# Patient Record
Sex: MALE | Race: BLACK OR AFRICAN AMERICAN | Employment: OTHER | RURAL
[De-identification: names, ages, dates, MRNs, and addresses within clinical notes are randomized per-mention and may not be internally consistent; named-entity substitution may affect disease eponyms.]

---

## 2017-01-04 ENCOUNTER — OFFICE VISIT (OUTPATIENT)
Dept: FAMILY MEDICINE CLINIC | Age: 82
End: 2017-01-04

## 2017-01-04 VITALS
SYSTOLIC BLOOD PRESSURE: 127 MMHG | DIASTOLIC BLOOD PRESSURE: 72 MMHG | HEIGHT: 68 IN | RESPIRATION RATE: 16 BRPM | HEART RATE: 74 BPM | WEIGHT: 167.6 LBS | OXYGEN SATURATION: 100 % | TEMPERATURE: 97.8 F | BODY MASS INDEX: 25.4 KG/M2

## 2017-01-04 DIAGNOSIS — Z23 NEED FOR TDAP VACCINATION: ICD-10-CM

## 2017-01-04 DIAGNOSIS — D75.1 ERYTHROCYTOSIS: Chronic | ICD-10-CM

## 2017-01-04 DIAGNOSIS — E11.40 TYPE 2 DIABETES MELLITUS WITH DIABETIC NEUROPATHY, WITHOUT LONG-TERM CURRENT USE OF INSULIN (HCC): Primary | Chronic | ICD-10-CM

## 2017-01-04 DIAGNOSIS — I10 ESSENTIAL HYPERTENSION: Chronic | ICD-10-CM

## 2017-01-04 DIAGNOSIS — E78.00 PURE HYPERCHOLESTEROLEMIA: Chronic | ICD-10-CM

## 2017-01-04 NOTE — PATIENT INSTRUCTIONS
Diabetes Foot Health: Care Instructions  Your Care Instructions    When you have diabetes, your feet need extra care and attention. Diabetes can damage the nerve endings and blood vessels in your feet, making you less likely to notice when your feet are injured. Diabetes also limits your body's ability to fight infection and get blood to areas that need it. If you get a minor foot injury, it could become an ulcer or a serious infection. With good foot care, you can prevent most of these problems. Caring for your feet can be quick and easy. Most of the care can be done when you are bathing or getting ready for bed. Follow-up care is a key part of your treatment and safety. Be sure to make and go to all appointments, and call your doctor if you are having problems. Its also a good idea to know your test results and keep a list of the medicines you take. How can you care for yourself at home? · Keep your blood sugar close to normal by watching what and how much you eat, monitoring blood sugar, taking medicines if prescribed, and getting regular exercise. · Do not smoke. Smoking affects blood flow and can make foot problems worse. If you need help quitting, talk to your doctor about stop-smoking programs and medicines. These can increase your chances of quitting for good. · Eat a diet that is low in fats. High fat intake can cause fat to build up in your blood vessels and decrease blood flow. · Inspect your feet daily for blisters, cuts, cracks, or sores. If you cannot see well, use a mirror or have someone help you. · Take care of your feet:  [facility] your feet every day. Use warm (not hot) water. Check the water temperature with your wrists or other part of your body, not your feet. ¨ Dry your feet well. Pat them dry. Do not rub the skin on your feet too hard. Dry well between your toes. If the skin on your feet stays moist, bacteria or a fungus can grow, which can lead to infection.   ¨ Keep your skin soft. Use moisturizing skin cream to keep the skin on your feet soft and prevent calluses and cracks. But do not put the cream between your toes, and stop using any cream that causes a rash. ¨ Clean underneath your toenails carefully. Do not use a sharp object to clean underneath your toenails. Use the blunt end of a nail file or other rounded tool. ¨ Trim and file your toenails straight across to prevent ingrown toenails. Use a nail clipper, not scissors. Use an emery board to smooth the edges. · Change socks daily. Socks without seams are best, because seams often rub the feet. You can find socks for people with diabetes from specialty catalogs. · Look inside your shoes every day for things like gravel or torn linings, which could cause blisters or sores. · Buy shoes that fit well:  ¨ Look for shoes that have plenty of space around the toes. This helps prevent bunions and blisters. ¨ Try on shoes while wearing the kind of socks you will usually wear with the shoes. ¨ Avoid plastic shoes. They may rub your feet and cause blisters. Good shoes should be made of materials that are flexible and breathable, such as leather or cloth. ¨ Break in new shoes slowly by wearing them for no more than an hour a day for several days. Take extra time to check your feet for red areas, blisters, or other problems after you wear new shoes. · Do not go barefoot. Do not wear sandals, and do not wear shoes with very thin soles. Thin soles are easy to puncture. They also do not protect your feet from hot pavement or cold weather. · Have your doctor check your feet during each visit. If you have a foot problem, see your doctor. Do not try to treat an early foot problem at home. Home remedies or treatments that you can buy without a prescription (such as corn removers) can be harmful. · Always get early treatment for foot problems. A minor irritation can lead to a major problem if not properly cared for early.   When should you call for help? Call your doctor now or seek immediate medical care if:  · You have a foot sore, an ulcer or break in the skin that is not healing after 4 days, bleeding corns or calluses, or an ingrown toenail. · You have blue or black areas, which can mean bruising or blood flow problems. · You have peeling skin or tiny blisters between your toes or cracking or oozing of the skin. · You have a fever for more than 24 hours and a foot sore. · You have new numbness or tingling in your feet that does not go away after you move your feet or change positions. · You have unexplained or unusual swelling of the foot or ankle. Watch closely for changes in your health, and be sure to contact your doctor if:  · You cannot do proper foot care. Where can you learn more? Go to http://marylu-jennifer.info/. Enter A739 in the search box to learn more about \"Diabetes Foot Health: Care Instructions. \"  Current as of: May 23, 2016  Content Version: 11.1  © 6344-7376 Healthwise, Incorporated. Care instructions adapted under license by [employer] (which disclaims liability or warranty for this information). If you have questions about a medical condition or this instruction, always ask your healthcare professional. [address] any warranty or liability for your use of this information.

## 2017-01-04 NOTE — MR AVS SNAPSHOT
Visit Information Date & Time Provider Department Dept. Phone Encounter #  
 1/4/2017  8:00 AM Aruna Rivas MD [address] [place], [place] [address] [id number] Follow-up Instructions Return in about 4 months (around 5/4/2017) for fasting. Blanco Mooreanne marie Upcoming Health Maintenance Date Due DTaP/Tdap/Td series (1 - Tdap) 9/7/2005 FOOT EXAM Q1 8/5/2016 HEMOGLOBIN A1C Q6M 11/4/2016 MEDICARE YEARLY EXAM 2/23/2017 MICROALBUMIN Q1 5/4/2017 LIPID PANEL Q1 5/4/2017 EYE EXAM RETINAL OR DILATED Q1 12/1/2017 GLAUCOMA SCREENING Q2Y 12/1/2018 Allergies as of 1/4/2017  Review Complete On: 1/4/2017 By: Aruna Rivas MD  
 No Known Allergies Current Immunizations  Reviewed on 5/4/2016 Name Date Influenza Vaccine 10/27/2015, 9/30/2014, 9/17/2013 Influenza Vaccine (Quad) PF 10/5/2016 Influenza Vaccine Split 11/14/2012, 10/14/2011, 10/11/2010 Influenza Vaccine Whole 10/11/2010, 9/9/2009 Pneumococcal Conjugate (PCV-13) 8/5/2015 Pneumococcal Vaccine (Unspecified Type) 12/1/2010, 11/16/1997 TD Vaccine 9/6/2005 Not reviewed this visit You Were Diagnosed With   
  
 Codes Comments Type 2 diabetes mellitus with diabetic neuropathy, without long-term current use of insulin (HCC)    -  Primary ICD-10-CM: E11.40 ICD-9-CM: 250.60, 357.2 Essential hypertension     ICD-10-CM: I10 
ICD-9-CM: 401.9 Erythrocytosis     ICD-10-CM: D75.1 ICD-9-CM: 289.0 Pure hypercholesterolemia     ICD-10-CM: E78.00 ICD-9-CM: 272.0 Need for Tdap vaccination     ICD-10-CM: H24 ICD-9-CM: V06.1 Vitals BP Pulse Temp Resp Height(growth percentile) Weight(growth percentile) 127/72 (BP 1 Location: Left arm, BP Patient Position: Sitting) 74 97.8 °F (36.6 °C) (Oral) 16 5' 8\" (1.727 m) 167 lb 9.6 oz (76 kg) SpO2 BMI Smoking Status 100% 25.48 kg/m2 Former Smoker Vitals History BMI and BSA Data Body Mass Index Body Surface Area  
 25.48 kg/m 2 1.91 m 2 Preferred Pharmacy Pharmacy Name Phone [facility] [address] [facility] [place], [facility] [address] [phone] Your Updated Medication List  
  
   
This list is accurate as of: 1/4/17  8:19 AM.  Always use your most recent med list.  
  
  
  
  
 aspirin 81 mg tablet Take  by mouth. Blood-Glucose Meter monitoring kit TrueTest Dx E11.9 testing once a day  
  
 diph,pertuss(acel),tetanus vac(PF) 2 Lf-(2.5-5-3-5 mcg)-5Lf/0.5 mL Syrg vaccine Commonly known as:  ADACEL(TDAP ADOLESN/ADULT)(PF)  
0.5 mL by IntraMUSCular route once for 1 dose. Administer in pharmacy and fax confirmation to [phone]. enalapril 10 mg tablet Commonly known as:  VASOTEC  
TAKE ONE TABLET BY MOUTH ONCE DAILY  
  
 finasteride 5 mg tablet Commonly known as:  PROSCAR Take 1 tablet by mouth daily. glucose blood VI test strips strip Commonly known as:  TRUETEST TEST STRIPS Dx E11.9 testing once a day  
  
 hydroCHLOROthiazide 25 mg tablet Commonly known as:  HYDRODIURIL  
TAKE ONE TABLET BY MOUTH ONCE DAILY (GENERIC FOR HYDRODIURIL) Lancets Misc Relion lancets. Check BS once daily. DX:E11.9  
  
 metFORMIN 500 mg tablet Commonly known as:  GLUCOPHAGE  
TAKE ONE-HALF TABLET BY MOUTH TWICE DAILY WITH MEALS  
  
 naproxen 375 mg tablet Commonly known as:  NAPROSYN Take 1 Tab by mouth two (2) times daily (with meals). simvastatin 20 mg tablet Commonly known as:  ZOCOR Take 1 Tab by mouth nightly. tamsulosin 0.4 mg capsule Commonly known as:  FLOMAX Take 1 capsule by mouth daily. triamcinolone acetonide 0.1 % topical cream  
Commonly known as:  KENALOG Apply  to affected area two (2) times a day. use thin layer VITAMIN D3 400 unit Tab tablet Generic drug:  cholecalciferol Take  by mouth daily. Prescriptions Printed Refills diph,pertuss,acel,,tetanus vac,PF, (ADACEL,TDAP ADOLESN/ADULT,,PF,) 2 Lf-(2.5-5-3-5 mcg)-5Lf/0.5 mL syrg vaccine 0 Si.5 mL by IntraMUSCular route once for 1 dose. Administer in pharmacy and fax confirmation to [phone]. Class: Print Route: IntraMUSCular Follow-up Instructions Return in about 4 months (around 2017) for fasting. James Patricia Patient Instructions Diabetes Foot Health: Care Instructions Your Care Instructions When you have diabetes, your feet need extra care and attention. Diabetes can damage the nerve endings and blood vessels in your feet, making you less likely to notice when your feet are injured. Diabetes also limits your body's ability to fight infection and get blood to areas that need it. If you get a minor foot injury, it could become an ulcer or a serious infection. With good foot care, you can prevent most of these problems. Caring for your feet can be quick and easy. Most of the care can be done when you are bathing or getting ready for bed. Follow-up care is a key part of your treatment and safety. Be sure to make and go to all appointments, and call your doctor if you are having problems. Its also a good idea to know your test results and keep a list of the medicines you take. How can you care for yourself at home? · Keep your blood sugar close to normal by watching what and how much you eat, monitoring blood sugar, taking medicines if prescribed, and getting regular exercise. · Do not smoke. Smoking affects blood flow and can make foot problems worse. If you need help quitting, talk to your doctor about stop-smoking programs and medicines. These can increase your chances of quitting for good. · Eat a diet that is low in fats. High fat intake can cause fat to build up in your blood vessels and decrease blood flow. · Inspect your feet daily for blisters, cuts, cracks, or sores. If you cannot see well, use a mirror or have someone help you. · Take care of your feet: 
[facility] [place] [facility] your feet every day. Use warm (not hot) water. Check the water temperature with your wrists or other part of your body, not your feet. ¨ Dry your feet well. Pat them dry. Do not rub the skin on your feet too hard. Dry well between your toes. If the skin on your feet stays moist, bacteria or a fungus can grow, which can lead to infection. ¨ Keep your skin soft. Use moisturizing skin cream to keep the skin on your feet soft and prevent calluses and cracks. But do not put the cream between your toes, and stop using any cream that causes a rash. ¨ Clean underneath your toenails carefully. Do not use a sharp object to clean underneath your toenails. Use the blunt end of a nail file or other rounded tool. ¨ Trim and file your toenails straight across to prevent ingrown toenails. Use a nail clipper, not scissors. Use an emery board to smooth the edges. · Change socks daily. Socks without seams are best, because seams often rub the feet. You can find socks for people with diabetes from specialty catalogs. · Look inside your shoes every day for things like gravel or torn linings, which could cause blisters or sores. · Buy shoes that fit well: 
¨ Look for shoes that have plenty of space around the toes. This helps prevent bunions and blisters. ¨ Try on shoes while wearing the kind of socks you will usually wear with the shoes. ¨ Avoid plastic shoes. They may rub your feet and cause blisters. Good shoes should be made of materials that are flexible and breathable, such as leather or cloth. ¨ Break in new shoes slowly by wearing them for no more than an hour a day for several days. Take extra time to check your feet for red areas, blisters, or other problems after you wear new shoes. · Do not go barefoot. Do not wear sandals, and do not wear shoes with very thin soles. Thin soles are easy to puncture. They also do not protect your feet from hot pavement or cold weather. · Have your doctor check your feet during each visit. If you have a foot problem, see your doctor. Do not try to treat an early foot problem at home. Home remedies or treatments that you can buy without a prescription (such as corn removers) can be harmful. · Always get early treatment for foot problems. A minor irritation can lead to a major problem if not properly cared for early. When should you call for help? Call your doctor now or seek immediate medical care if: 
· You have a foot sore, an ulcer or break in the skin that is not healing after 4 days, bleeding corns or calluses, or an ingrown toenail. · You have blue or black areas, which can mean bruising or blood flow problems. · You have peeling skin or tiny blisters between your toes or cracking or oozing of the skin. · You have a fever for more than 24 hours and a foot sore. · You have new numbness or tingling in your feet that does not go away after you move your feet or change positions. · You have unexplained or unusual swelling of the foot or ankle. Watch closely for changes in your health, and be sure to contact your doctor if: 
· You cannot do proper foot care. Where can you learn more? Go to http://marylu-jennifer.info/. Enter A739 in the search box to learn more about \"Diabetes Foot Health: Care Instructions. \" Current as of: May 23, 2016 Content Version: 11.1 © 8099-9115 [employer]. Care instructions adapted under license by [employer] (which disclaims liability or warranty for this information). If you have questions about a medical condition or this instruction, always ask your healthcare professional. [address] any warranty or liability for your use of this information. Introducing [facility]! Maia Wesley introduces [employer] patient portal. Now you can access parts of your medical record, email your doctor's office, and request medication refills online. 1. In your internet browser, go to [email or web address] 2. Click on the First Time User? Click Here link in the Sign In box. You will see the New Member Sign Up page. 3. Enter your SNTMNT Access Code exactly as it appears below. You will not need to use this code after youve completed the sign-up process. If you do not sign up before the expiration date, you must request a new code. · SNTMNT Access Code: [email or web address] Expires: 1/29/2017 10:26 AM 
 
4. Enter the last four digits of your Social Security Number (xxxx) and Date of Birth (mm/dd/yyyy) as indicated and click Submit. You will be taken to the next sign-up page. 5. Create a Aquatic Informaticst ID. This will be your SNTMNT login ID and cannot be changed, so think of one that is secure and easy to remember. 6. Create a SNTMNT password. You can change your password at any time. 7. Enter your Password Reset Question and Answer. This can be used at a later time if you forget your password. 8. Enter your e-mail address. You will receive e-mail notification when new information is available in [address] 9. Click Sign Up. You can now view and download portions of your medical record. 10. Click the Download Summary menu link to download a portable copy of your medical information. If you have questions, please visit the Frequently Asked Questions section of the SNTMNT website. Remember, SNTMNT is NOT to be used for urgent needs. For medical emergencies, dial 911. Now available from your iPhone and Android! Please provide this summary of care documentation to your next provider. Your primary care clinician is listed as [address]. If you have any questions after today's visit, please call [phone].

## 2017-01-04 NOTE — PROGRESS NOTES
Chief Complaint   Patient presents with    Hypertension    Labs     Fasting     Body mass index is 25.48 kg/(m^2). Reviewed record in preparation for visit and have necessary documentation  Pt did not bring medication to office visit for review  Information was given to pt on Advanced Directives, Living Will  Information was given on Shingles Vaccine  Opportunity was given for questions  Goals that were addressed and/or need to be completed after this appointment include:     Health Maintenance Due   Topic Date Due    DTaP/Tdap/Td series (1 - Tdap) 09/07/2005    FOOT EXAM Q1  08/05/2016    HEMOGLOBIN A1C Q6M  11/04/2016       - check for functional glucose monitor and record keeping system; Yes, checks sugars twice daily  Pt was given BS record log to document home readings and return to office for review  Diabetic Bundle:  LDL-7.3  A1C-6.0  BP-127/72  Smoking? No  Anticoagulation medication? Yes  Eye exam dilated? Good  Foot exam?Due

## 2017-01-04 NOTE — PROGRESS NOTES
Progress Note    Patient: Erik Scott MRN: [id number]  SSN: [id number]    YOB: 1927  Age: 80 y.o. Sex: male        Chief Complaint   Patient presents with    Hypertension    Labs     Fasting         Subjective:     Encounter Diagnoses   Name Primary?  Type 2 diabetes mellitus with diabetic neuropathy, without long-term current use of insulin ([facility] [address]): This patient is managed under a comprehensive plan of care for Diabetes. Overall the patient feels well with good energy level. Last Point of Care HGB A1C  Hemoglobin A1c (POC)   Date Value Ref Range Status   08/05/2015 6.1 % Final       Pertinent Labs:   Lab Results   Component Value Date/Time    Hemoglobin A1c 6.0 05/04/2016 08:22 AM    Hemoglobin A1c 6.0 12/30/2015 08:21 AM    Hemoglobin A1c 6.3 05/01/2015 01:53 PM      Body mass index is 25.48 kg/(m^2). Lab Results   Component Value Date/Time    LDL, calculated 73 05/04/2016 08:22 AM         Lab Results   Component Value Date/Time    Sodium 136 05/04/2016 08:22 AM    Potassium 4.5 05/04/2016 08:22 AM    Chloride 95 05/04/2016 08:22 AM    CO2 26 05/04/2016 08:22 AM    Anion gap 10 07/15/2010 08:52 AM    Glucose 87 05/04/2016 08:22 AM    BUN 11 05/04/2016 08:22 AM    Creatinine 0.87 05/04/2016 08:22 AM    BUN/Creatinine ratio 13 05/04/2016 08:22 AM    GFR est AA 89 05/04/2016 08:22 AM    GFR est non-AA 77 05/04/2016 08:22 AM    Calcium 9.4 05/04/2016 08:22 AM    ALT 11 05/04/2016 08:22 AM    AST 16 05/04/2016 08:22 AM    Alk.  phosphatase 53 05/04/2016 08:22 AM    Protein, total 7.7 05/04/2016 08:22 AM    Albumin 4.3 05/04/2016 08:22 AM    Globulin 3.9 07/15/2010 08:52 AM    A-G Ratio 1.3 05/04/2016 08:22 AM     Lab Results   Component Value Date/Time    Microalbumin/Creat ratio (mg/g creat) 5 07/15/2010 08:52 AM    Microalb/Creat ratio (ug/mg creat.) <5.1 05/04/2016 08:22 AM    Microalbumin,urine random 0.77 07/15/2010 08:52 AM      Frequency of home glucose testing:daily   Blood Sugar range at home:    Last eye exam: In past 12 months. Last foot exam: Today   Polyuria, polyphagia or polydipsia: No   Retinopathy: No   Neuropathy SX: mild   Low blood sugar symptoms: No   Dietary compliance: Good   Medication compliance:Good   On ASA: Yes   Depression: No   CKD:no     Wt Readings from Last 3 Encounters:   01/04/17 167 lb 9.6 oz (76 kg)   08/09/16 161 lb 6.4 oz (73.2 kg)   06/03/16 159 lb 6.4 oz (72.3 kg)        History   Smoking Status    Former Smoker    Packs/day: 1.00    Years: 15.00   Smokeless Tobacco    Never Used     All the patient's questions regarding medications, diet and exercise were answered. Goal of A1C of less than 7% is our goal.   Our overall goal is to reduce or eliminate the long term consequences of poorly controlled diabetes. Yes    Essential hypertension:  BP Readings from Last 3 Encounters:   01/04/17 127/72   08/09/16 103/61   06/03/16 121/72     The patient reports:  taking medications as instructed, no medication side effects noted, no TIA's, no chest pain on exertion, no dyspnea on exertion, no swelling of ankles. Lab Results   Component Value Date/Time    Sodium 136 05/04/2016 08:22 AM    Potassium 4.5 05/04/2016 08:22 AM    Chloride 95 05/04/2016 08:22 AM    CO2 26 05/04/2016 08:22 AM    Anion gap 10 07/15/2010 08:52 AM    Glucose 87 05/04/2016 08:22 AM    BUN 11 05/04/2016 08:22 AM    Creatinine 0.87 05/04/2016 08:22 AM    BUN/Creatinine ratio 13 05/04/2016 08:22 AM    GFR est AA 89 05/04/2016 08:22 AM    GFR est non-AA 77 05/04/2016 08:22 AM    Calcium 9.4 05/04/2016 08:22 AM    Bilirubin, total 0.6 05/04/2016 08:22 AM    ALT 11 05/04/2016 08:22 AM    AST 16 05/04/2016 08:22 AM    Alk.  phosphatase 53 05/04/2016 08:22 AM    Protein, total 7.7 05/04/2016 08:22 AM    Albumin 4.3 05/04/2016 08:22 AM    Globulin 3.9 07/15/2010 08:52 AM    A-G Ratio 1.3 05/04/2016 08:22 AM     Our goal is to normalize the blood pressure to decrease the risks of strokes and heart attacks. The patient is in agreement with the plan.  Erythrocytosis:  Lab Results   Component Value Date/Time    HGB 14.7 05/04/2016 08:22 AM            Pure hypercholesterolemia:  Cardiovascular risks for him are: LDL goal is under 80  diabetic  hypertension  hyperlipidemia. Currently he takes Zocor (simvastatin) , 20 mg. Lab Results   Component Value Date/Time    Cholesterol, total 130 05/04/2016 08:22 AM    HDL Cholesterol 47 05/04/2016 08:22 AM    LDL, calculated 73 05/04/2016 08:22 AM    Triglyceride 49 05/04/2016 08:22 AM    CHOL/HDL Ratio 3.1 07/15/2010 08:52 AM     Lab Results   Component Value Date/Time    ALT 11 05/04/2016 08:22 AM    AST 16 05/04/2016 08:22 AM    Alk. phosphatase 53 05/04/2016 08:22 AM    Bilirubin, total 0.6 05/04/2016 08:22 AM      Myalgias: No   Fatigue: No   Other side effects: no  Wt Readings from Last 3 Encounters:   01/04/17 167 lb 9.6 oz (76 kg)   08/09/16 161 lb 6.4 oz (73.2 kg)   06/03/16 159 lb 6.4 oz (72.3 kg)     The patient is aware of our goal to reduce or eliminate the long term problems (such as strokes and heart attacks) related to poorly controlled hyperlipidemia.  Need for Tdap vaccination: rx given              Current and past medical information:    Current Medications after this visit[de-identified]     Current Outpatient Prescriptions   Medication Sig    diph,pertuss,acel,,tetanus vac,PF, (ADACEL,TDAP ADOLESN/ADULT,,PF,) 2 Lf-(2.5-5-3-5 mcg)-5Lf/0.5 mL syrg vaccine 0.5 mL by IntraMUSCular route once for 1 dose. Administer in pharmacy and fax confirmation to [phone].  metFORMIN (GLUCOPHAGE) 500 mg tablet TAKE ONE-HALF TABLET BY MOUTH TWICE DAILY WITH MEALS    hydroCHLOROthiazide (HYDRODIURIL) 25 mg tablet TAKE ONE TABLET BY MOUTH ONCE DAILY (GENERIC FOR HYDRODIURIL)    enalapril (VASOTEC) 10 mg tablet TAKE ONE TABLET BY MOUTH ONCE DAILY    naproxen (NAPROSYN) 375 mg tablet Take 1 Tab by mouth two (2) times daily (with meals).     Lancets misc Relion lancets. Check BS once daily. DX:E11.9    glucose blood VI test strips (TRUETEST TEST STRIPS) strip Dx E11.9 testing once a day    Blood-Glucose Meter monitoring kit TrueTest Dx E11.9 testing once a day    simvastatin (ZOCOR) 20 mg tablet Take 1 Tab by mouth nightly.  tamsulosin (FLOMAX) 0.4 mg capsule Take 1 capsule by mouth daily.  finasteride (PROSCAR) 5 mg tablet Take 1 tablet by mouth daily.  cholecalciferol (VITAMIN D-3) 400 unit Tab tablet Take  by mouth daily.  aspirin 81 mg Tab Take  by mouth.  triamcinolone acetonide (KENALOG) 0.1 % topical cream Apply  to affected area two (2) times a day. use thin layer     No current facility-administered medications for this visit. Patient Active Problem List    Diagnosis Date Noted    Type 2 diabetes mellitus with diabetic neuropathy ([facility] [250.60, 357.2] 08/05/2015     Priority: 1 - One    Diabetes ([facility] 03/10/2014     Priority: 1 - One    Colon polyp 11/16/2010     Priority: 1 - One    Hyperlipidemia 11/16/2010     Priority: 1 - One    Hypertension      Priority: 1 - One    Erythrocytosis      Priority: 1 - One    Erectile dysfunction      Priority: 1 - One    Prostate cancer ([facility] 01/27/2011    Hyperkeratosis 11/16/2010       Past Medical History   Diagnosis Date    Allergic rhinitis     Benign prostatic hypertrophy     Erectile dysfunction     Erythrocytosis     Hypertension     Polyp, colon        No Known Allergies    History reviewed. No pertinent past surgical history.     Social History     Social History    Marital status:      Spouse name: N/A    Number of children: N/A    Years of education: N/A     Social History Main Topics    Smoking status: Former Smoker     Packs/day: 1.00     Years: 15.00    Smokeless tobacco: Never Used    Alcohol use No    Drug use: No    Sexual activity: Not Asked     Other Topics Concern    None     Social History Narrative       Review of Systems Constitutional: Negative. Negative for chills, fever, malaise/fatigue and weight loss. HENT: Negative. Negative for hearing loss. Eyes: Negative. Negative for blurred vision and double vision. Last eye exam was perfect   Respiratory: Negative. Negative for cough, hemoptysis, sputum production and shortness of breath. Cardiovascular: Negative. Negative for chest pain, palpitations and orthopnea. Gastrointestinal: Negative. Negative for abdominal pain, blood in stool, heartburn, nausea and vomiting. Genitourinary: Negative. Negative for dysuria, frequency and urgency. Musculoskeletal: Negative. Negative for back pain, myalgias and neck pain. Skin: Negative. Negative for rash. Neurological: Negative. Negative for dizziness, tingling, tremors, weakness and headaches. Endo/Heme/Allergies: Negative. Psychiatric/Behavioral: Negative. Negative for depression. Objective:     Vitals:    01/04/17 0806   BP: 127/72   Pulse: 74   Resp: 16   Temp: 97.8 °F (36.6 °C)   TempSrc: Oral   SpO2: 100%   Weight: 167 lb 9.6 oz (76 kg)   Height: 5' 8\" (1.727 m)      Body mass index is 25.48 kg/(m^2). Physical Exam   Constitutional: He is oriented to person, place, and time and well-developed, well-nourished, and in no distress. HENT:   Head: Normocephalic and atraumatic. Mouth/Throat: Oropharynx is clear and moist.   Eyes: Right eye exhibits no discharge. Left eye exhibits no discharge. No scleral icterus. Neck: No tracheal deviation present. No thyromegaly present. No bruit. Cardiovascular: Normal rate, regular rhythm and normal heart sounds. Pulmonary/Chest: Effort normal and breath sounds normal.   Abdominal: Soft. Musculoskeletal:   Diabetic foot exam:  Sensation: Missed approximately one fourth of the monofilament testing  Calluses or corns: no  Pulses: intact  Fungal nails: no     Neurological: He is alert and oriented to person, place, and time.    Skin: No rash noted. No erythema. Psychiatric: Mood and affect normal.   Nursing note and vitals reviewed. Health Maintenance Due   Topic Date Due    DTaP/Tdap/Td series (1 - Tdap)-prescription given  09/07/2005    FOOT EXAM Q1-done  08/05/2016    HEMOGLOBIN A1C Q6M- 11/04/2016       Assessment and orders:       ICD-10-CM ICD-9-CM    1. Type 2 diabetes mellitus with diabetic neuropathy, without long-term current use of insulin (HCC) good control by blood sugar report  E11.40 250.60 HM DIABETES FOOT EXAM     357.2 HEMOGLOBIN A1C WITH EAG      LIPID PANEL      METABOLIC PANEL, COMPREHENSIVE      MICROALBUMIN, UR, RAND W/ MICROALBUMIN/CREA RATIO      TSH 3RD GENERATION   2. Essential hypertension-controlled  I10 401.9 HEMOGLOBIN      TSH 3RD GENERATION   3. Erythrocytosis-normal    D75.1 289.0 HEMOGLOBIN   4. Pure hypercholesterolemia-recheck  E78.00 272.0 LIPID PANEL      METABOLIC PANEL, COMPREHENSIVE      TSH 3RD GENERATION   5. Need for Tdap vaccination Z23 V06.1 diph,pertuss,acel,,tetanus vac,PF, (ADACEL,TDAP ADOLESN/ADULT,,PF,) 2 Lf-(2.5-5-3-5 mcg)-5Lf/0.5 mL syrg vaccine         Plan of care:  Discussed diagnoses in detail with patient. Medication risks/benefits/side effects discussed with patient. All of the patient's questions were addressed. The patient understands and agrees with our plan of care. The patient knows to call back if they are unsure of or forget any changes we discussed today or if the symptoms change. The patient received an After-Visit Summary which contains VS, orders, medication list and allergy list. This can be used as a \"mini-medical record\" should they have to seek medical care while out of town. Patient Care Team:  Claudy Hill MD as PCP - General    Follow-up Disposition:  Return in about 4 months (around 5/4/2017) for fasting. .    No future appointments.     Signed By: Claudy Hill MD     January 4, 2017

## 2017-01-05 LAB
ALBUMIN SERPL-MCNC: 4.3 G/DL (ref 3.5–4.7)
ALBUMIN/CREAT UR: <4.1 MG/G CREAT (ref 0–30)
ALBUMIN/GLOB SERPL: 1.4 {RATIO} (ref 1.1–2.5)
ALP SERPL-CCNC: 53 IU/L (ref 39–117)
ALT SERPL-CCNC: 7 IU/L (ref 0–44)
AST SERPL-CCNC: 16 IU/L (ref 0–40)
BILIRUB SERPL-MCNC: 0.8 MG/DL (ref 0–1.2)
BUN SERPL-MCNC: 13 MG/DL (ref 8–27)
BUN/CREAT SERPL: 15 (ref 10–22)
CALCIUM SERPL-MCNC: 9.3 MG/DL (ref 8.6–10.2)
CHLORIDE SERPL-SCNC: 95 MMOL/L (ref 96–106)
CHOLEST SERPL-MCNC: 135 MG/DL (ref 100–199)
CO2 SERPL-SCNC: 28 MMOL/L (ref 18–29)
CREAT SERPL-MCNC: 0.87 MG/DL (ref 0.76–1.27)
CREAT UR-MCNC: 72.9 MG/DL
EST. AVERAGE GLUCOSE BLD GHB EST-MCNC: 120 MG/DL
GLOBULIN SER CALC-MCNC: 3.1 G/DL (ref 1.5–4.5)
GLUCOSE SERPL-MCNC: 90 MG/DL (ref 65–99)
HBA1C MFR BLD: 5.8 % (ref 4.8–5.6)
HDLC SERPL-MCNC: 50 MG/DL
HGB BLD-MCNC: 15.9 G/DL (ref 12.6–17.7)
LDLC SERPL CALC-MCNC: 73 MG/DL (ref 0–99)
MICROALBUMIN UR-MCNC: <3 UG/ML
POTASSIUM SERPL-SCNC: 4.1 MMOL/L (ref 3.5–5.2)
PROT SERPL-MCNC: 7.4 G/DL (ref 6–8.5)
SODIUM SERPL-SCNC: 139 MMOL/L (ref 134–144)
TRIGL SERPL-MCNC: 60 MG/DL (ref 0–149)
TSH SERPL DL<=0.005 MIU/L-ACNC: 2.85 UIU/ML (ref 0.45–4.5)
VLDLC SERPL CALC-MCNC: 12 MG/DL (ref 5–40)

## 2017-01-10 DIAGNOSIS — E78.00 PURE HYPERCHOLESTEROLEMIA: Chronic | ICD-10-CM

## 2017-01-10 RX ORDER — SIMVASTATIN 20 MG/1
20 TABLET, FILM COATED ORAL
Qty: 90 TAB | Refills: 3 | Status: SHIPPED | OUTPATIENT
Start: 2017-01-10 | End: 2017-04-18 | Stop reason: SDUPTHER

## 2017-03-20 ENCOUNTER — OFFICE VISIT (OUTPATIENT)
Dept: FAMILY MEDICINE CLINIC | Age: 82
End: 2017-03-20

## 2017-03-20 VITALS
HEART RATE: 69 BPM | BODY MASS INDEX: 26.37 KG/M2 | OXYGEN SATURATION: 100 % | TEMPERATURE: 97.4 F | DIASTOLIC BLOOD PRESSURE: 48 MMHG | WEIGHT: 174 LBS | RESPIRATION RATE: 18 BRPM | SYSTOLIC BLOOD PRESSURE: 120 MMHG | HEIGHT: 68 IN

## 2017-03-20 DIAGNOSIS — Z13.31 SCREENING FOR DEPRESSION: ICD-10-CM

## 2017-03-20 DIAGNOSIS — Z13.39 SCREENING FOR ALCOHOLISM: ICD-10-CM

## 2017-03-20 DIAGNOSIS — Z00.00 MEDICARE ANNUAL WELLNESS VISIT, SUBSEQUENT: Primary | ICD-10-CM

## 2017-03-20 NOTE — MR AVS SNAPSHOT
Visit Information Date & Time Provider Department Dept. Phone Encounter #  
 3/20/2017  1:25 PM Nik Lozoya MD [address] [place] [address] [phone] [id number] Follow-up Instructions Return as scheduled. Comfort Rodriguezing Your Appointments 5/12/2017  8:00 AM  
ROUTINE CARE with Nik Lozoya MD  
[address] [place] [facility]) Appt Note: 4 mo f/u-fasting;Diabetes; 4 mo f/u-fasting;Diabetes [address] [address]  
[place] [address] [address] Upcoming Health Maintenance Date Due DTaP/Tdap/Td series (1 - Tdap) 9/7/2005 MEDICARE YEARLY EXAM 2/23/2017 HEMOGLOBIN A1C Q6M 7/4/2017 EYE EXAM RETINAL OR DILATED Q1 12/1/2017 FOOT EXAM Q1 1/4/2018 MICROALBUMIN Q1 1/4/2018 LIPID PANEL Q1 1/4/2018 GLAUCOMA SCREENING Q2Y 12/1/2018 Allergies as of 3/20/2017  Review Complete On: 3/20/2017 By: Nik Lozoya MD  
 No Known Allergies Current Immunizations  Reviewed on 5/4/2016 Name Date Influenza Vaccine 10/27/2015, 9/30/2014, 9/17/2013 Influenza Vaccine (Quad) PF 10/5/2016 Influenza Vaccine Split 11/14/2012, 10/14/2011, 10/11/2010 Influenza Vaccine Whole 10/11/2010, 9/9/2009 Pneumococcal Conjugate (PCV-13) 8/5/2015 Pneumococcal Vaccine (Unspecified Type) 12/1/2010, 11/16/1997 TD Vaccine 9/6/2005 Not reviewed this visit You Were Diagnosed With   
  
 Codes Comments Medicare annual wellness visit, subsequent    -  Primary ICD-10-CM: Z00.00 ICD-9-CM: V70.0 Screening for alcoholism     ICD-10-CM: Z13.89 ICD-9-CM: V79.1 Screening for depression     ICD-10-CM: Z13.89 ICD-9-CM: V79.0 Vitals BP Pulse Temp Resp Height(growth percentile) Weight(growth percentile) 120/48 (BP 1 Location: Right arm, BP Patient Position: Sitting) 69 97.4 °F (36.3 °C) (Oral) 18 5' 8\" (1.727 m) 174 lb (78.9 kg) SpO2 BMI Smoking Status 100% 26.46 kg/m2 Former Smoker Vitals History BMI and BSA Data Body Mass Index Body Surface Area  
 26.46 kg/m 2 1.95 m 2 Preferred Pharmacy Pharmacy Name Phone [facility] [address] [facility] [address] [phone] Your Updated Medication List  
  
   
This list is accurate as of: 3/20/17  1:57 PM.  Always use your most recent med list.  
  
  
  
  
 aspirin 81 mg tablet Take  by mouth. Blood-Glucose Meter monitoring kit TrueTest Dx E11.9 testing once a day  
  
 enalapril 10 mg tablet Commonly known as:  VASOTEC  
TAKE ONE TABLET BY MOUTH ONCE DAILY  
  
 finasteride 5 mg tablet Commonly known as:  PROSCAR Take 1 tablet by mouth daily. glucose blood VI test strips strip Commonly known as:  TRUETEST TEST STRIPS Dx E11.9 testing once a day  
  
 hydroCHLOROthiazide 25 mg tablet Commonly known as:  HYDRODIURIL  
TAKE ONE TABLET BY MOUTH ONCE DAILY (GENERIC FOR HYDRODIURIL) Lancets Misc Relion lancets. Check BS once daily. DX:E11.9  
  
 metFORMIN 500 mg tablet Commonly known as:  GLUCOPHAGE  
TAKE ONE-HALF TABLET BY MOUTH TWICE DAILY WITH MEALS  
  
 naproxen 375 mg tablet Commonly known as:  NAPROSYN Take 1 Tab by mouth two (2) times daily (with meals). simvastatin 20 mg tablet Commonly known as:  ZOCOR Take 1 Tab by mouth nightly. tamsulosin 0.4 mg capsule Commonly known as:  FLOMAX Take 1 capsule by mouth daily. triamcinolone acetonide 0.1 % topical cream  
Commonly known as:  KENALOG Apply  to affected area two (2) times a day. use thin layer VITAMIN D3 400 unit Tab tablet Generic drug:  cholecalciferol Take  by mouth daily. We Performed the Following OK ANNUAL ALCOHOL SCREEN 15 MIN [id number] [facility]] [address] [employer] [ [facility]] Follow-up Instructions Return as scheduled. Frederic Yeh Patient Instructions Well Visit, Over 72: Care Instructions Your Care Instructions Physical exams can help you stay healthy. Your doctor has checked your overall health and may have suggested ways to take good care of yourself. He or she also may have recommended tests. At home, you can help prevent illness with healthy eating, regular exercise, and other steps. Follow-up care is a key part of your treatment and safety. Be sure to make and go to all appointments, and call your doctor if you are having problems. It's also a good idea to know your test results and keep a list of the medicines you take. How can you care for yourself at home? · Reach and stay at a healthy weight. This will lower your risk for many problems, such as obesity, diabetes, heart disease, and high blood pressure. · Get at least 30 minutes of exercise on most days of the week. Walking is a good choice. You also may want to do other activities, such as running, swimming, cycling, or playing tennis or team sports. · Do not smoke. Smoking can make health problems worse. If you need help quitting, talk to your doctor about stop-smoking programs and medicines. These can increase your chances of quitting for good. · Protect your skin from too much sun. When you're outdoors from 10 a.m. to 4 p.m., stay in the shade or cover up with clothing and a hat with a wide brim. Wear sunglasses that block UV rays. Even when it's cloudy, put broad-spectrum sunscreen (SPF 30 or higher) on any exposed skin. · See a dentist one or two times a year for checkups and to have your teeth cleaned. · Wear a seat belt in the car. · Limit alcohol to 2 drinks a day for men and 1 drink a day for women. Too much alcohol can cause health problems. Follow your doctor's advice about when to have certain tests. These tests can spot problems early. For men and women · Cholesterol.  Your doctor will tell you how often to have this done based on your overall health and other things that can increase your risk for heart attack and stroke. · Blood pressure. Have your blood pressure checked during a routine doctor visit. Your doctor will tell you how often to check your blood pressure based on your age, your blood pressure results, and other factors. · Diabetes. Ask your doctor whether you should have tests for diabetes. · Vision. Experts recommend that you have yearly exams for glaucoma and other age-related eye problems. · Hearing. Tell your doctor if you notice any change in your hearing. You can have tests to find out how well you hear. · Colon cancer tests. Keep having colon cancer tests as your doctor recommends. You can have one of several types of tests. · Heart attack and stroke risk. At least every 4 to 6 years, you should have your risk for heart attack and stroke assessed. Your doctor uses factors such as your age, blood pressure, cholesterol, and whether you smoke or have diabetes to show what your risk for a heart attack or stroke is over the next 10 years. · Osteoporosis. Talk to your doctor about whether you should have a bone density test to find out whether you have thinning bones. Also ask your doctor about whether you should take calcium and vitamin D supplements. For women · Pap test and pelvic exam. You may no longer need a Pap test. Talk with your doctor about whether to stop or continue to have Pap tests. · Breast exam and mammogram. Ask how often you should have a mammogram, which is an X-ray of your breasts. A mammogram can spot breast cancer before it can be felt and when it is easiest to treat. · Thyroid disease. Talk to your doctor about whether to have your thyroid checked as part of a regular physical exam. Women have an increased chance of a thyroid problem. For men · Prostate exam. Talk to your doctor about whether you should have a blood test (called a PSA test) for prostate cancer.  Experts disagree on whether men should have this test. Some experts recommend that you discuss the benefits and risks of the test with your doctor. · Abdominal aortic aneurysm. Ask your doctor whether you should have a test to check for an aneurysm. You may need a test if you ever smoked or if your parent, brother, sister, or child has had an aneurysm. When should you call for help? Watch closely for changes in your health, and be sure to contact your doctor if you have any problems or symptoms that concern you. Where can you learn more? Go to http://marylu-jennifer.info/. Enter C617 in the search box to learn more about \"Well Visit, Over 65: Care Instructions. \" Current as of: July 19, 2016 Content Version: 11.1 © 8285-8850 [employer], [employer]. Care instructions adapted under license by [employer] (which disclaims liability or warranty for this information). If you have questions about a medical condition or this instruction, always ask your healthcare professional. [place] [address] any warranty or liability for your use of this information. Introducing [facility]! Celine Lezama introduces [employer] patient portal. Now you can access parts of your medical record, email your doctor's office, and request medication refills online. 1. In your internet browser, go to [email or web address] 2. Click on the First Time User? Click Here link in the Sign In box. You will see the New Member Sign Up page. 3. Enter your [employer] Access Code exactly as it appears below. You will not need to use this code after youve completed the sign-up process. If you do not sign up before the expiration date, you must request a new code. · [employer] Access Code: [email or web address] Expires: 6/18/2017  1:17 PM 
 
4. Enter the last four digits of your Social Security Number (xxxx) and Date of Birth (mm/dd/yyyy) as indicated and click Submit.  You will be taken to the next sign-up page. 5. Create a [employer] ID. This will be your [employer] login ID and cannot be changed, so think of one that is secure and easy to remember. 6. Create a [employer] password. You can change your password at any time. 7. Enter your Password Reset Question and Answer. This can be used at a later time if you forget your password. 8. Enter your e-mail address. You will receive e-mail notification when new information is available in [address] 9. Click Sign Up. You can now view and download portions of your medical record. 10. Click the Download Summary menu link to download a portable copy of your medical information. If you have questions, please visit the Frequently Asked Questions section of the [employer] website. Remember, [employer] is NOT to be used for urgent needs. For medical emergencies, dial 911. Now available from your iPhone and Android! Please provide this summary of care documentation to your next provider. Your primary care clinician is listed as [address]. If you have any questions after today's visit, please call [phone].

## 2017-03-20 NOTE — PROGRESS NOTES
[address] [facility]  2005 [facility], [address] [place] [address]             Date of visit: 3/20/2017       This is a Subsequent Medicare Annual Wellness Visit (AWV), (Performed more than 12 months after effective date of Medicare Part B enrollment and 12 months after last wellness visit)    I have reviewed the patient's medical history in detail and updated the computerized patient record. Tu Bonner is a 80 y.o. male   History obtained from: the patient. Concerns today   (Patient understands that medical problems addressed today may incur additional notes andcost as this is a preventive visit)      History     Patient Active Problem List   Diagnosis Code    Hypertension I10    Erythrocytosis D75.1    Erectile dysfunction N52.9    Colon polyp K63.5    Hyperkeratosis L85.9    Hyperlipidemia E78.5    Prostate cancer ([address] C61    Diabetes ([facility] E11.9    Type 2 diabetes mellitus with diabetic neuropathy ([facility] [address] [250.60, 357.2] E11.40     Past Medical History:   Diagnosis Date    Allergic rhinitis     Benign prostatic hypertrophy     Erectile dysfunction     Erythrocytosis     Hypertension     Polyp, colon       History reviewed. No pertinent surgical history. No Known Allergies  Current Outpatient Prescriptions   Medication Sig Dispense Refill    simvastatin (ZOCOR) 20 mg tablet Take 1 Tab by mouth nightly. 90 Tab 3    metFORMIN (GLUCOPHAGE) 500 mg tablet TAKE ONE-HALF TABLET BY MOUTH TWICE DAILY WITH MEALS 90 Tab 0    hydroCHLOROthiazide (HYDRODIURIL) 25 mg tablet TAKE ONE TABLET BY MOUTH ONCE DAILY (GENERIC FOR HYDRODIURIL) 90 Tab 3    enalapril (VASOTEC) 10 mg tablet TAKE ONE TABLET BY MOUTH ONCE DAILY 90 Tab 2    naproxen (NAPROSYN) 375 mg tablet Take 1 Tab by mouth two (2) times daily (with meals). 30 Tab 0    Lancets misc Relion lancets. Check BS once daily.  DX:E11.9 100 Each 1    glucose blood VI test strips (TRUETEST TEST STRIPS) strip Dx E11.9 testing once a day 50 Strip 5    Blood-Glucose Meter monitoring kit TrueTest Dx E11.9 testing once a day 1 Kit 0    triamcinolone acetonide (KENALOG) 0.1 % topical cream Apply  to affected area two (2) times a day. use thin layer 85.2 g 4    tamsulosin (FLOMAX) 0.4 mg capsule Take 1 capsule by mouth daily.  finasteride (PROSCAR) 5 mg tablet Take 1 tablet by mouth daily.  cholecalciferol (VITAMIN D-3) 400 unit Tab tablet Take  by mouth daily.  aspirin 81 mg Tab Take  by mouth. Family History   Problem Relation Age of Onset    Cancer Mother     Cancer Brother      lung    Heart Disease Brother      Social History   Substance Use Topics    Smoking status: Former Smoker     Packs/day: 1.00     Years: 15.00    Smokeless tobacco: Never Used    Alcohol use No       Specialists/Care Team   [employer]. Chris Garza has established care with the following healthcare providers:  Patient Care Team:  Keeley Calles MD as PCP - [place] [address]     Demographics   male  80 y.o. General Health Questions   -During the past 4 weeks:   -how would you rate your health in general? Good   -how often have you been bothered by feeling dizzy when standing up? never   -how much have you been bothered by bodily pain? not   -Have you noticed any hearing difficulties? no   -has your physical and emotional health limited your social activities with family or friends? no    Emotional Health Questions   -Do you have a history of depression, anxiety, or emotional problems? no  -Over the past 2 weeks, have you felt down, depressed or hopeless? no  -Over the past 2 weeks, have you felt little interest or pleasure in doing things? no    Health Habits   Please describe your diet habits: B grade. Do you get 5 servings of fruits or vegetables daily? yes  Do you exercise regularly? yes    Alcohol Risk Factor Screening:    On any occasion during the past 3 months, have you had more than 4 drinks containing alcohol? No   Do you average more than 14 drinks per week? No     Activities of Daily Living and Functional Status   -Do you need help with eating, walking, dressing, bathing, toileting, the phone, transportation, shopping, preparing meals, housework, laundry, medications or managing money? no  -In the past four weeks, was someone available to help you if you needed and wanted help with anything? yes  -Are you confident are you that you can control and manage most of your health problems? yes  -Have you been given information to help you keep track of your medications? yes  -How often do you have trouble taking your medications as prescribed? never    Fall Risk and Home Safety   Have you fallen 2 or more times in the past year? no  Does your home have rugs in the hallway, lack grab bars in the bathroom, lack handrails on the stairs or have poor lighting? no  Do you have smoke detectors and check them regularly? yes  Do you have difficulties driving a car? no  Do you always fasten your seat belt when you are in a car? yes    Review of Systems (if indicated for problems addressed today)   Review of Systems   Constitutional: Negative. Negative for chills, fever, malaise/fatigue and weight loss. Remarkably well for his age. HENT: Negative. Negative for hearing loss. Eyes: Negative. Negative for blurred vision and double vision. Respiratory: Negative. Negative for cough, hemoptysis, sputum production and shortness of breath. Cardiovascular: Negative. Negative for chest pain, palpitations and orthopnea. Gastrointestinal: Negative. Negative for abdominal pain, blood in stool, heartburn, nausea and vomiting. Genitourinary: Negative. Negative for dysuria, frequency and urgency. Musculoskeletal: Negative. Negative for back pain, myalgias and neck pain. Skin: Negative. Negative for rash. Neurological: Negative. Negative for dizziness, tingling, tremors, weakness and headaches. Endo/Heme/Allergies: Negative. Psychiatric/Behavioral: Negative. Negative for depression. Physical Examination   Has gained some weight but his BMI is still normal.  Wt Readings from Last 3 Encounters:   03/20/17 174 lb (78.9 kg)   01/04/17 167 lb 9.6 oz (76 kg)   08/09/16 161 lb 6.4 oz (73.2 kg)     Temp Readings from Last 3 Encounters:   03/20/17 97.4 °F (36.3 °C) (Oral)   01/04/17 97.8 °F (36.6 °C) (Oral)   10/05/16 97.6 °F (36.4 °C) (Oral)     BP Readings from Last 3 Encounters:   03/20/17 120/48   01/04/17 127/72   08/09/16 103/61     Pulse Readings from Last 3 Encounters:   03/20/17 69   01/04/17 74   08/09/16 74       Body mass index is 26.46 kg/(m^2). No exam data present  Was the patient's timed Up & Go test unsteady or longer than 60 seconds? no    Evaluation of Cognitive Function   Mood/affect:  happy  Orientation: Person, Place and Time  Appearance: age appropriate and casually dressed  Family member/caregiver input: noo    Additional exam if indicated for problems addressed today:  Physical Exam   Constitutional: He is oriented to person, place, and time. He appears well-developed and well-nourished. HENT:   Head: Normocephalic and atraumatic. Left Ear: External ear normal.   Mouth/Throat: Oropharynx is clear and moist.   Eyes: Conjunctivae are normal. Pupils are equal, round, and reactive to light. Neck: No thyromegaly present. No carotid bruit. Cardiovascular: Normal rate, regular rhythm and normal heart sounds. Exam reveals no gallop and no friction rub. No murmur heard. Pulmonary/Chest: Effort normal and breath sounds normal. He has no wheezes. He has no rales. Abdominal: Soft. Bowel sounds are normal. He exhibits no distension. There is no tenderness. There is no rebound and no guarding. Musculoskeletal: He exhibits no edema or tenderness. Lymphadenopathy:     He has no cervical adenopathy. Neurological: He is alert and oriented to person, place, and time. Skin: Skin is warm and dry. No rash noted. He is not diaphoretic. Psychiatric: He has a normal mood and affect. His behavior is normal. Judgment and thought content normal.   Nursing note and vitals reviewed. Advice/Referrals/Counseling (as indicated)   Education and counseling provided for any problems identified above: none    Preventive Services     (Preventive care checklist to be included in patient instructions)  Discussed today Done Previously Not Needed     x  Pneumococcal vaccines    x  Flu vaccine     x Hepatitis B vaccine (if at risk)   X-cost   Shingles vaccine   X-cost   TDAP vaccine     x Digital rectal exam     x PSA     x Colorectal cancer screening     x Low-dose CT for lung cancer screening     x Bone density test   x x  Glaucoma screening    x  Cholesterol test    x  Diabetes screening test     x x Diabetes self-management class     x Nutritionist referral for diabetes or renal disease     Discussion of Advance Directive   Discussed with Don Espinal Nickel his ability to prepare and advance directive in the case that an injury or illness causes him to be unable to make health care decisions. Assessment/Plan   Z00.00    ICD-10-CM ICD-9-CM    1. Medicare annual wellness visit, subsequent Z00.00 V70.0 NC DEPRESSION SCREEN ANNUAL      NC ANNUAL ALCOHOL SCREEN 15 MIN   2.  Screening for alcoholism Z13.89 V79.1 NC ANNUAL ALCOHOL SCREEN 15 MIN   3. Screening for depression Z13.89 V79.0 NC DEPRESSION SCREEN ANNUAL       Orders Placed This Encounter    NC DEPRESSION SCREEN ANNUAL    NC ANNUAL ALCOHOL SCREEN 15 MIN       Patient Care Team:  Mary Meade MD as PCP - General    Follow-up Disposition:5/12    Future Appointments  Date Time Provider Department Center   5/12/2017 8:00 AM MD Joyce East MD

## 2017-03-20 NOTE — PROGRESS NOTES
Reviewed record in preparation for visit and have necessary documentation  Pt did not bring medication to office visit for review  Information was given to pt on Advanced Directives, Living Will  opportunity was given for questions  Goals that were addressed and/or need to be completed during or after this appointment include   Health Maintenance Due   Topic Date Due    DTaP/Tdap/Td series (1 - Tdap) 09/07/2005    MEDICARE YEARLY EXAM  02/23/2017

## 2017-03-20 NOTE — PATIENT INSTRUCTIONS

## 2017-03-22 RX ORDER — METFORMIN HYDROCHLORIDE 500 MG/1
TABLET ORAL
Qty: 90 TAB | Refills: 0 | Status: SHIPPED | OUTPATIENT
Start: 2017-03-22 | End: 2019-02-26 | Stop reason: ALTCHOICE

## 2017-04-08 ENCOUNTER — APPOINTMENT (OUTPATIENT)
Dept: CT IMAGING | Age: 82
DRG: 379 | End: 2017-04-08
Attending: PHYSICIAN ASSISTANT
Payer: MEDICARE

## 2017-04-08 ENCOUNTER — HOSPITAL ENCOUNTER (INPATIENT)
Age: 82
LOS: 2 days | Discharge: HOME | DRG: 379 | End: 2017-04-10
Attending: EMERGENCY MEDICINE | Admitting: FAMILY MEDICINE
Payer: MEDICARE

## 2017-04-08 DIAGNOSIS — K62.5 RECTAL BLEEDING: Primary | ICD-10-CM

## 2017-04-08 DIAGNOSIS — K63.89 COLONIC MASS: ICD-10-CM

## 2017-04-08 DIAGNOSIS — K57.31 DIVERTICULOSIS LARGE INTESTINE W/O PERFORATION OR ABSCESS W/BLEEDING: ICD-10-CM

## 2017-04-08 PROBLEM — K92.2 GI BLEED: Status: ACTIVE | Noted: 2017-04-08

## 2017-04-08 LAB
ALBUMIN SERPL BCP-MCNC: 3.6 G/DL (ref 3.5–5)
ALBUMIN/GLOB SERPL: 0.9 {RATIO} (ref 1.1–2.2)
ALP SERPL-CCNC: 53 U/L (ref 45–117)
ALT SERPL-CCNC: 12 U/L (ref 12–78)
ANION GAP BLD CALC-SCNC: 7 MMOL/L (ref 5–15)
APPEARANCE UR: CLEAR
APTT PPP: 33.6 SEC (ref 22.1–32.5)
AST SERPL W P-5'-P-CCNC: 12 U/L (ref 15–37)
BASOPHILS # BLD AUTO: 0 K/UL (ref 0–0.1)
BASOPHILS # BLD: 0 % (ref 0–1)
BILIRUB SERPL-MCNC: 0.8 MG/DL (ref 0.2–1)
BILIRUB UR QL: NEGATIVE
BUN SERPL-MCNC: 10 MG/DL (ref 6–20)
BUN/CREAT SERPL: 12 (ref 12–20)
CALCIUM SERPL-MCNC: 8.8 MG/DL (ref 8.5–10.1)
CHLORIDE SERPL-SCNC: 100 MMOL/L (ref 97–108)
CO2 SERPL-SCNC: 28 MMOL/L (ref 21–32)
COLOR UR: NORMAL
CREAT SERPL-MCNC: 0.81 MG/DL (ref 0.7–1.3)
EOSINOPHIL # BLD: 0 K/UL (ref 0–0.4)
EOSINOPHIL NFR BLD: 1 % (ref 0–7)
ERYTHROCYTE [DISTWIDTH] IN BLOOD BY AUTOMATED COUNT: 13.6 % (ref 11.5–14.5)
GLOBULIN SER CALC-MCNC: 4 G/DL (ref 2–4)
GLUCOSE BLD STRIP.AUTO-MCNC: 84 MG/DL (ref 65–100)
GLUCOSE BLD STRIP.AUTO-MCNC: 93 MG/DL (ref 65–100)
GLUCOSE SERPL-MCNC: 122 MG/DL (ref 65–100)
GLUCOSE UR STRIP.AUTO-MCNC: NEGATIVE MG/DL
HCT VFR BLD AUTO: 42.4 % (ref 36.6–50.3)
HCT VFR BLD AUTO: 43.9 % (ref 36.6–50.3)
HEMOCCULT STL QL: POSITIVE
HGB BLD-MCNC: 14.6 G/DL (ref 12.1–17)
HGB BLD-MCNC: 15 G/DL (ref 12.1–17)
HGB UR QL STRIP: NEGATIVE
INR PPP: 1.1 (ref 0.9–1.1)
KETONES UR QL STRIP.AUTO: NEGATIVE MG/DL
LACTATE SERPL-SCNC: 1.5 MMOL/L (ref 0.4–2)
LEUKOCYTE ESTERASE UR QL STRIP.AUTO: NEGATIVE
LYMPHOCYTES # BLD AUTO: 15 % (ref 12–49)
LYMPHOCYTES # BLD: 0.9 K/UL (ref 0.8–3.5)
MAGNESIUM SERPL-MCNC: 1.7 MG/DL (ref 1.6–2.4)
MCH RBC QN AUTO: 31.9 PG (ref 26–34)
MCHC RBC AUTO-ENTMCNC: 33.3 G/DL (ref 30–36.5)
MCV RBC AUTO: 96.1 FL (ref 80–99)
MONOCYTES # BLD: 0.5 K/UL (ref 0–1)
MONOCYTES NFR BLD AUTO: 7 % (ref 5–13)
NEUTS SEG # BLD: 4.8 K/UL (ref 1.8–8)
NEUTS SEG NFR BLD AUTO: 77 % (ref 32–75)
NITRITE UR QL STRIP.AUTO: NEGATIVE
PH UR STRIP: 6.5 [PH] (ref 5–8)
PLATELET # BLD AUTO: 134 K/UL (ref 150–400)
POTASSIUM SERPL-SCNC: 3.9 MMOL/L (ref 3.5–5.1)
PROT SERPL-MCNC: 7.6 G/DL (ref 6.4–8.2)
PROT UR STRIP-MCNC: NEGATIVE MG/DL
PROTHROMBIN TIME: 11.2 SEC (ref 9–11.1)
RBC # BLD AUTO: 4.57 M/UL (ref 4.1–5.7)
SERVICE CMNT-IMP: NORMAL
SERVICE CMNT-IMP: NORMAL
SODIUM SERPL-SCNC: 135 MMOL/L (ref 136–145)
SP GR UR REFRACTOMETRY: 1.01 (ref 1–1.03)
THERAPEUTIC RANGE,PTTT: ABNORMAL SECS (ref 58–77)
UROBILINOGEN UR QL STRIP.AUTO: 0.2 EU/DL (ref 0.2–1)
WBC # BLD AUTO: 6.2 K/UL (ref 4.1–11.1)

## 2017-04-08 PROCEDURE — 81003 URINALYSIS AUTO W/O SCOPE: CPT | Performed by: PHYSICIAN ASSISTANT

## 2017-04-08 PROCEDURE — 74011636320 HC RX REV CODE- 636/320: Performed by: EMERGENCY MEDICINE

## 2017-04-08 PROCEDURE — 86900 BLOOD TYPING SEROLOGIC ABO: CPT | Performed by: PHYSICIAN ASSISTANT

## 2017-04-08 PROCEDURE — 74011250636 HC RX REV CODE- 250/636: Performed by: PHYSICIAN ASSISTANT

## 2017-04-08 PROCEDURE — 36415 COLL VENOUS BLD VENIPUNCTURE: CPT | Performed by: PHYSICIAN ASSISTANT

## 2017-04-08 PROCEDURE — 51798 US URINE CAPACITY MEASURE: CPT

## 2017-04-08 PROCEDURE — 74178 CT ABD&PLV WO CNTR FLWD CNTR: CPT

## 2017-04-08 PROCEDURE — 85018 HEMOGLOBIN: CPT

## 2017-04-08 PROCEDURE — C9113 INJ PANTOPRAZOLE SODIUM, VIA: HCPCS | Performed by: FAMILY MEDICINE

## 2017-04-08 PROCEDURE — 96374 THER/PROPH/DIAG INJ IV PUSH: CPT

## 2017-04-08 PROCEDURE — 74011250636 HC RX REV CODE- 250/636: Performed by: FAMILY MEDICINE

## 2017-04-08 PROCEDURE — 99285 EMERGENCY DEPT VISIT HI MDM: CPT

## 2017-04-08 PROCEDURE — 85025 COMPLETE CBC W/AUTO DIFF WBC: CPT | Performed by: PHYSICIAN ASSISTANT

## 2017-04-08 PROCEDURE — 65660000000 HC RM CCU STEPDOWN

## 2017-04-08 PROCEDURE — C9113 INJ PANTOPRAZOLE SODIUM, VIA: HCPCS | Performed by: PHYSICIAN ASSISTANT

## 2017-04-08 PROCEDURE — 80053 COMPREHEN METABOLIC PANEL: CPT | Performed by: PHYSICIAN ASSISTANT

## 2017-04-08 PROCEDURE — 83605 ASSAY OF LACTIC ACID: CPT | Performed by: PHYSICIAN ASSISTANT

## 2017-04-08 PROCEDURE — 82272 OCCULT BLD FECES 1-3 TESTS: CPT | Performed by: PHYSICIAN ASSISTANT

## 2017-04-08 PROCEDURE — 86920 COMPATIBILITY TEST SPIN: CPT | Performed by: PHYSICIAN ASSISTANT

## 2017-04-08 PROCEDURE — 85730 THROMBOPLASTIN TIME PARTIAL: CPT | Performed by: PHYSICIAN ASSISTANT

## 2017-04-08 PROCEDURE — 85610 PROTHROMBIN TIME: CPT | Performed by: PHYSICIAN ASSISTANT

## 2017-04-08 PROCEDURE — 82962 GLUCOSE BLOOD TEST: CPT

## 2017-04-08 PROCEDURE — 83735 ASSAY OF MAGNESIUM: CPT | Performed by: PHYSICIAN ASSISTANT

## 2017-04-08 RX ORDER — PANTOPRAZOLE SODIUM 40 MG/10ML
80 INJECTION, POWDER, LYOPHILIZED, FOR SOLUTION INTRAVENOUS
Status: COMPLETED | OUTPATIENT
Start: 2017-04-08 | End: 2017-04-08

## 2017-04-08 RX ORDER — DEXTROMETHORPHAN HYDROBROMIDE, GUAIFENESIN 5; 100 MG/5ML; MG/5ML
650 LIQUID ORAL
COMMUNITY

## 2017-04-08 RX ORDER — ACETAMINOPHEN 325 MG/1
650 TABLET ORAL
Status: DISCONTINUED | OUTPATIENT
Start: 2017-04-08 | End: 2017-04-10 | Stop reason: HOSPADM

## 2017-04-08 RX ORDER — SODIUM CHLORIDE 0.9 % (FLUSH) 0.9 %
5-10 SYRINGE (ML) INJECTION EVERY 8 HOURS
Status: DISCONTINUED | OUTPATIENT
Start: 2017-04-08 | End: 2017-04-10 | Stop reason: HOSPADM

## 2017-04-08 RX ORDER — DEXTROSE 50 % IN WATER (D50W) INTRAVENOUS SYRINGE
12.5-25 AS NEEDED
Status: DISCONTINUED | OUTPATIENT
Start: 2017-04-08 | End: 2017-04-10 | Stop reason: HOSPADM

## 2017-04-08 RX ORDER — INSULIN LISPRO 100 [IU]/ML
INJECTION, SOLUTION INTRAVENOUS; SUBCUTANEOUS EVERY 6 HOURS
Status: DISCONTINUED | OUTPATIENT
Start: 2017-04-08 | End: 2017-04-09

## 2017-04-08 RX ORDER — SODIUM CHLORIDE 9 MG/ML
250 INJECTION, SOLUTION INTRAVENOUS AS NEEDED
Status: DISCONTINUED | OUTPATIENT
Start: 2017-04-08 | End: 2017-04-10 | Stop reason: HOSPADM

## 2017-04-08 RX ORDER — SODIUM CHLORIDE 9 MG/ML
125 INJECTION, SOLUTION INTRAVENOUS CONTINUOUS
Status: DISCONTINUED | OUTPATIENT
Start: 2017-04-08 | End: 2017-04-09

## 2017-04-08 RX ORDER — SODIUM CHLORIDE 0.9 % (FLUSH) 0.9 %
5-10 SYRINGE (ML) INJECTION AS NEEDED
Status: DISCONTINUED | OUTPATIENT
Start: 2017-04-08 | End: 2017-04-10 | Stop reason: HOSPADM

## 2017-04-08 RX ORDER — MAGNESIUM SULFATE 100 %
4 CRYSTALS MISCELLANEOUS AS NEEDED
Status: DISCONTINUED | OUTPATIENT
Start: 2017-04-08 | End: 2017-04-10 | Stop reason: HOSPADM

## 2017-04-08 RX ORDER — PANTOPRAZOLE SODIUM 40 MG/10ML
40 INJECTION, POWDER, LYOPHILIZED, FOR SOLUTION INTRAVENOUS EVERY 12 HOURS
Status: DISCONTINUED | OUTPATIENT
Start: 2017-04-08 | End: 2017-04-10 | Stop reason: HOSPADM

## 2017-04-08 RX ADMIN — SODIUM CHLORIDE 125 ML/HR: 900 INJECTION, SOLUTION INTRAVENOUS at 23:39

## 2017-04-08 RX ADMIN — PANTOPRAZOLE SODIUM 80 MG: 40 INJECTION, POWDER, FOR SOLUTION INTRAVENOUS at 11:38

## 2017-04-08 RX ADMIN — IOPAMIDOL 100 ML: 755 INJECTION, SOLUTION INTRAVENOUS at 12:56

## 2017-04-08 RX ADMIN — Medication 10 ML: at 21:11

## 2017-04-08 RX ADMIN — PANTOPRAZOLE SODIUM 40 MG: 40 INJECTION, POWDER, FOR SOLUTION INTRAVENOUS at 21:11

## 2017-04-08 RX ADMIN — SODIUM CHLORIDE 125 ML/HR: 900 INJECTION, SOLUTION INTRAVENOUS at 15:25

## 2017-04-08 RX ADMIN — Medication 10 ML: at 15:25

## 2017-04-08 NOTE — H&P
[address] [facility] [address] [place] [address]   Office [phone]  Fax [phone]       Admission H&P     Name: Lord Perdue MRN: [id number]  Sex: Male   YOB: 1927  Age: 80 y.o. PCP: Nellie Sharma MD     Source of Information: patient, medical records    Chief complaint: \"bloody stool\"    History of Present Illness  Lord Perdue is a 80 y.o. male with known T2DM, HLD, HTN, diverticulosis and hx of prostate CA who presents to the ER complaining of bloody stool for 1 day. The patient reports that he had some lower abdominal pain/cramping throughout the day yesterday. He reports that when he took his first bowel movment this morning, it was very loose and had blood in it. The patient reports that he has never experienced this before, and decided to drive himself (along with his family to the hospital) to be evaluated. He reports that he does not have any other symptoms (including abdominal pain) on presentation today (no abdominal cramping, nausea/vomitting, chest pain, shortness of breath, constipation). On further history taking, I found out that the patient has recently resumed taking an old prescription for Nparosyn for some mild shoulder pain over the past several days. The patient was prescribed this medication back in 8/2017 for an inguinal hernia. The patient also reports that he takes daily aspirin for his shoulder pain. He also reports that he took an [address] yesterday for his stomach pain and cramping (he cannot tell me for sure what is in the [address] tablets). In the ER, vital signs were unremarkable (the patient is hemodynamically stable). Labs were remarkable for positive FOBT. Hemoglobin noted to be 14.2. CT abdomen/pelvis showed no evidence of gastrointestinal hemorrhage and extensive colonic diverticulosis. Pt was treated with 80mg IV Protonix. Home Medications   Prior to Admission medications    Medication Sig Start Date End Date Taking? Authorizing Provider   acetaminophen (TYLENOL) 650 mg CR tablet Take 650 mg by mouth nightly. Yes Historical Provider   metFORMIN (GLUCOPHAGE) 500 mg tablet TAKE ONE-HALF TABLET BY MOUTH TWICE DAILY WITH MEALS 3/22/17  Yes Jackie Silverman NP   simvastatin (ZOCOR) 20 mg tablet Take 1 Tab by mouth nightly. 1/10/17  Yes Diallo Lundy MD   hydroCHLOROthiazide (HYDRODIURIL) 25 mg tablet TAKE ONE TABLET BY MOUTH ONCE DAILY (GENERIC FOR HYDRODIURIL) 11/29/16  Yes Diallo Lundy MD   enalapril (VASOTEC) 10 mg tablet TAKE ONE TABLET BY MOUTH ONCE DAILY 11/7/16  Yes Diallo Lundy MD   tamsulosin [facility]) 0.4 mg capsule Take 0.4 mg by mouth daily (after dinner). 12/15/14  Yes Diallo Lundy MD   finasteride (PROSCAR) 5 mg tablet Take 5 mg by mouth daily (after dinner). 12/15/14  Yes Diallo Lundy MD   cholecalciferol (VITAMIN D-3) 400 unit Tab tablet Take 400 Units by mouth daily. Yes Historical Provider   aspirin 81 mg Tab Take 81 mg by mouth daily. Yes Historical Provider       Allergies  No Known Allergies    Past Medical History:   Diagnosis Date    Allergic rhinitis     Benign prostatic hypertrophy     Erectile dysfunction     Erythrocytosis     Hypertension     Polyp, colon      History reviewed. No pertinent surgical history. Family History   Problem Relation Age of Onset    Cancer Mother     Cancer Brother      lung    Heart Disease Brother      Social History  Social History     Social History    Marital status:      Spouse name: N/A    Number of children: N/A    Years of education: N/A     Occupational History    Not on file. Social History Main Topics    Smoking status: Former Smoker     Packs/day: 1.00     Years: 15.00    Smokeless tobacco: Never Used    Alcohol use No    Drug use: No    Sexual activity: Not on file     Other Topics Concern    Not on file     Social History Narrative     Alcohol history: Not at all. Stopped drinking in 1960s.   Smoking history: Former smoker, quit in 1960s. Illicit drug history: Not at all    Living arrangement: patient lives with their spouse. Ambulates: Independently    Patient does not have or use any DME. Review of Systems  Review of Systems   Constitutional: Negative for activity change, chills and fever. HENT: Negative for congestion. Respiratory: Negative for cough, chest tightness and shortness of breath. Cardiovascular: Negative for chest pain and palpitations. Gastrointestinal: Positive for blood in stool. Negative for abdominal pain (no abdominal pain now.), constipation, diarrhea, nausea and vomiting. Genitourinary: Negative for dysuria. Musculoskeletal: Negative for arthralgias and myalgias. Skin: Negative for pallor. Neurological: Negative for dizziness, light-headedness and headaches. All other systems reviewed and are negative. Physical Exam  Objective:  General Appearance:  Comfortable, well-appearing, in no acute distress and not in pain. Vital signs: (most recent): Blood pressure 125/74, pulse 67, temperature 97.2 °F (36.2 °C), resp. rate 16, height 5' 9\" (1.753 m), weight 174 lb (78.9 kg), SpO2 100 %. No fever. HEENT: Normal HEENT exam.    Lungs:  Normal respiratory rate and normal effort. He is not in respiratory distress. Breath sounds clear to auscultation. No stridor. No wheezes, rales, rhonchi or decreased breath sounds. Heart: Normal rate. Regular rhythm. S1 normal and S2 normal.  No murmur, gallop or friction rub. Extremities: Normal range of motion. There is no dependent edema. Neurological: Patient is alert and oriented to person, place and time. Normal strength. Skin:  Warm and dry. Abdomen: Abdomen is soft and non-distended. Bowel sounds are normal.   There is no abdominal tenderness. There is no rebound tenderness. There is no guarding. There is no mass. Pupils:  Pupils are equal, round, and reactive to light.     Pulses: Distal pulses are intact. O2 Device: Room air     Laboratory Data  Recent Results (from the past 8 hour(s))   CBC WITH AUTOMATED DIFF    Collection Time: 04/08/17 11:17 AM   Result Value Ref Range    WBC 6.2 4.1 - 11.1 K/uL    RBC 4.57 4.10 - 5.70 M/uL    HGB 14.6 12.1 - 17.0 g/dL    HCT 43.9 36.6 - 50.3 %    MCV 96.1 80.0 - 99.0 FL    MCH 31.9 26.0 - 34.0 PG    MCHC 33.3 30.0 - 36.5 g/dL    RDW 13.6 11.5 - 14.5 %    PLATELET 740 (L) 290 - 400 K/uL    NEUTROPHILS 77 (H) 32 - 75 %    LYMPHOCYTES 15 12 - 49 %    MONOCYTES 7 5 - 13 %    EOSINOPHILS 1 0 - 7 %    BASOPHILS 0 0 - 1 %    ABS. NEUTROPHILS 4.8 1.8 - 8.0 K/UL    ABS. LYMPHOCYTES 0.9 0.8 - 3.5 K/UL    ABS. MONOCYTES 0.5 0.0 - 1.0 K/UL    ABS. EOSINOPHILS 0.0 0.0 - 0.4 K/UL    ABS. BASOPHILS 0.0 0.0 - 0.1 K/UL   METABOLIC PANEL, COMPREHENSIVE    Collection Time: 04/08/17 11:17 AM   Result Value Ref Range    Sodium 135 (L) 136 - 145 mmol/L    Potassium 3.9 3.5 - 5.1 mmol/L    Chloride 100 97 - 108 mmol/L    CO2 28 21 - 32 mmol/L    Anion gap 7 5 - 15 mmol/L    Glucose 122 (H) 65 - 100 mg/dL    BUN 10 6 - 20 MG/DL    Creatinine 0.81 0.70 - 1.30 MG/DL    BUN/Creatinine ratio 12 12 - 20      GFR est AA >60 >60 ml/min/1.73m2    GFR est non-AA >60 >60 ml/min/1.73m2    Calcium 8.8 8.5 - 10.1 MG/DL    Bilirubin, total 0.8 0.2 - 1.0 MG/DL    ALT (SGPT) 12 12 - 78 U/L    AST (SGOT) 12 (L) 15 - 37 U/L    Alk.  phosphatase 53 45 - 117 U/L    Protein, total 7.6 6.4 - 8.2 g/dL    Albumin 3.6 3.5 - 5.0 g/dL    Globulin 4.0 2.0 - 4.0 g/dL    A-G Ratio 0.9 (L) 1.1 - 2.2     MAGNESIUM    Collection Time: 04/08/17 11:17 AM   Result Value Ref Range    Magnesium 1.7 1.6 - 2.4 mg/dL   TYPE & SCREEN    Collection Time: 04/08/17 11:17 AM   Result Value Ref Range    Crossmatch Expiration 04/11/2017     ABO/Rh(D) B POSITIVE     Antibody screen NEG     Unit number [id number]     Blood component type RC LR AS1     Unit division 00     Status of unit ALLOCATED     Crossmatch result Compatible Unit number [id number]     Blood component type RC LR AS1     Unit division 00     Status of unit ALLOCATED     Crossmatch result Compatible    LACTIC ACID, PLASMA    Collection Time: 04/08/17 11:17 AM   Result Value Ref Range    Lactic acid 1.5 0.4 - 2.0 MMOL/L   PROTHROMBIN TIME + INR    Collection Time: 04/08/17 11:17 AM   Result Value Ref Range    INR 1.1 0.9 - 1.1      Prothrombin time 11.2 (H) 9.0 - 11.1 sec   PTT    Collection Time: 04/08/17 11:17 AM   Result Value Ref Range    aPTT 33.6 (H) 22.1 - 32.5 sec    aPTT, therapeutic range     58.0 - 77.0 SECS   OCCULT BLOOD, STOOL    Collection Time: 04/08/17 11:17 AM   Result Value Ref Range    Occult blood, stool POSITIVE (A) NEG     URINALYSIS W/ RFLX MICROSCOPIC    Collection Time: 04/08/17 11:27 AM   Result Value Ref Range    Color YELLOW/STRAW      Appearance CLEAR CLEAR      Specific gravity 1.008 1.003 - 1.030      pH (UA) 6.5 5.0 - 8.0      Protein NEGATIVE  NEG mg/dL    Glucose NEGATIVE  NEG mg/dL    Ketone NEGATIVE  NEG mg/dL    Bilirubin NEGATIVE  NEG      Blood NEGATIVE  NEG      Urobilinogen 0.2 0.2 - 1.0 EU/dL    Nitrites NEGATIVE  NEG      Leukocyte Esterase NEGATIVE  NEG         Imaging  CXR Results  (Last 48 hours)    None        CT Results  (Last 48 hours)               04/08/17 1250  CT ABD PELV W WO CONT Final result    Impression:  IMPRESSION:       No evidence for acute gastrointestinal hemorrhage. Extensive colonic   diverticulosis without evidence for diverticulitis. Trace pelvic free fluid of   uncertain significance. Questionable nonobstructing mass within the colonic hepatic flexure. Recommend   nonemergent colonoscopy to exclude colon cancer. Right inguinal hernia containing nondilated segment of distal small bowel. No   bowel obstruction. Please refer to above findings for complete details.        23X                       Narrative:  INDICATION: Acute rectal bleeding with abdominal discomfort       COMPARISON: None       TECHNIQUE:    Thin axial images were obtained through the abdomen and pelvis with and without   intravenous iodinated contrast administration. Coronal and sagittal   reconstructions were generated. 3-D images were generated. Oral contrast was not   administered. CT dose reduction was achieved through use of a standardized   protocol tailored for this examination and automatic exposure control for dose   modulation. FINDINGS:        LIVER: A couple low-density left hepatic lobe lesions too small to characterize. Minimal intrahepatic biliary ductal prominence with left hepatic atrophy of   uncertain clinical significance. The common bile duct is not dilated. GALLBLADDER: Probable sludge without calcified stone. SPLEEN: Unremarkable   PANCREAS: No mass or ductal dilatation. ADRENALS: Moderate nonspecific thickening   KIDNEYS/URETERS: Normal symmetric nephrograms with bilateral renal cysts and   other low-density lesions too small to characterize. No evidence for any   enhancing renal mass. No hydronephrosis or hydroureter. PERITONEUM: No abdominal lymphadenopathy or ascites. COLON: No evidence for acute gastrointestinal hemorrhage. Extensive colonic   diverticulosis without evidence for acute diverticulitis. Questionable   nonobstructing mass within the colonic hepatic flexure (series 6, image 74). APPENDIX: Unremarkable. SMALL BOWEL: Right inguinal hernia containing nondilated segment of distal small   bowel. No bowel obstruction. No evidence for small bowel hemorrhage   STOMACH: Unremarkable. PELVIS: No pelvic lymphadenopathy. Trace pelvic free fluid. No significant   bladder abnormality. BONES: Moderate lumbosacral spondylosis most pronounced at L5-S1. No acute   osseous abnormality. VISUALIZED THORAX: Bibasilar bronchiectasis likely due to chronic inflammation. Dependent atelectasis in the lungs.    ADDITIONAL COMMENTS: N/A                   Assessment and Plan     Nikki Razo Ladd Bosworth is a 80 y.o. male T2DM, HLD, HTN, diverticulosis who is admitted for GI Bleed. GI Bleed - patient presenting with history of bloody stool. FOBT+. Rectal exam grossly bloody. Hemodynamically stable. Hgb is at 14. Patient gives history of heavy NSAID use over the course of the last week. Last colonoscopy was done 1/6/2012--showed right colon polyps and left colon diverticulosis. - Admitting patient to stepdown, inpatient status. - Consulting GI, appreciate their recs. - Trending H&H Q6H.  - NPO  - Insert 2 large-bore IVs.  - Type & Cross complete--keeping 2 units ahead. - IV protonix, 40mg BID.  - Running MIVF:  NS @ 125cc/hr. Hypertension  - BP on admission 137/67. At this time, 125/74.  - Holding home BP medications as patient is NPO. Can treat severe BPs with IV medications if needed. Diabetes Mellitus T2  - Last HgA1c 5.8 in 1/2017.   - Discontinued home medications of metformin. .  - Insulin Sliding Scale normal sensitivity with Q6H glucose checks (as patient is NPO). - Hypoglycemia protocols ordered. Hyperlipidemia - last lipid panel (1/2017): Cholesterol 135, HDL 50, LDL 73, TG 60.  -Holding home Zocor as patient is NPO. FEN/GI - NPO. NS at 125 mL/hr. Activity - Ambulate with assistance  DVT prophylaxis - SCDs  GI prophylaxis - Protonix  Fall prophylaxis - Not indicated at this time. Disposition - Admit to Stepdown. Plan to d/c to TBD. Consulting PT/OT/CM. Code Status - Full, discussed with patient / caregivers. Patient Nava Morejon will be discussed Dr. Cristino Ly.     4:59 PM, 04/08/17  Valdez Broderick MD  Family Medicine Resident       For Billing    Chief Complaint   Patient presents with   [facility] Problems  Date Reviewed: 3/20/2017          Codes Class Noted POA    GI bleed ICD-10-CM: K92.2  ICD-9-CM: 578.9  4/8/2017 Unknown

## 2017-04-08 NOTE — PROGRESS NOTES
Bedside and Verbal shift change report given to Lynne Agent (oncoming nurse) by Gopi Jorgensen RN (offgoing nurse). Report included the following information SBAR, Procedure Summary, Intake/Output, MAR and Cardiac Rhythm NSR.

## 2017-04-08 NOTE — ED PROVIDER NOTES
HPI Comments: Ignacio Espinal is a 80 y.o. male who presents ambulatory with niece and wife to the ED with a c/o rectal bleeding x today. Pt notes he has been having lower abd cramping intermittently x a few days, but none now. He reports having left shoulder pains x a few weeks and taking asa regularly for the same. He notes he had a colonoscopy 6 years ago by Dr. Candance Oar. Pt denies cp, sob, n/v/,d urinary sx, headache or weakness. PCP: Avtar Lynch MD  PMHx significant for: Past Medical History:  No date: Allergic rhinitis  No date: Benign prostatic hypertrophy  No date: Erectile dysfunction  No date: Erythrocytosis  No date: Hypertension  No date: Polyp, colon  PSHx significant for: History reviewed. No pertinent surgical history. Social Hx: Tobacco: Denies EtOH: denies Illicit drug use: denies    There are no further complaints or symptoms at this time. The history is provided by the patient. Past Medical History:   Diagnosis Date    Allergic rhinitis     Benign prostatic hypertrophy     Erectile dysfunction     Erythrocytosis     Hypertension     Polyp, colon        History reviewed. No pertinent surgical history. Family History:   Problem Relation Age of Onset    Cancer Mother     Cancer Brother      lung    Heart Disease Brother        Social History     Social History    Marital status:      Spouse name: N/A    Number of children: N/A    Years of education: N/A     Occupational History    Not on file. Social History Main Topics    Smoking status: Former Smoker     Packs/day: 1.00     Years: 15.00    Smokeless tobacco: Never Used    Alcohol use No    Drug use: No    Sexual activity: Not on file     Other Topics Concern    Not on file     Social History Narrative         ALLERGIES: Review of patient's allergies indicates no known allergies. Review of Systems   Constitutional: Negative for chills and fever.    HENT: Negative for congestion, rhinorrhea, sneezing and sore throat. Eyes: Negative for redness and visual disturbance. Respiratory: Negative for shortness of breath. Cardiovascular: Negative for chest pain and leg swelling. Gastrointestinal: Positive for abdominal pain and anal bleeding. Negative for nausea and vomiting. Genitourinary: Negative for difficulty urinating and frequency. Musculoskeletal: Negative for back pain, myalgias and neck stiffness. Skin: Negative for rash. Neurological: Negative for dizziness, syncope, weakness and headaches. Hematological: Negative for adenopathy. Patient Vitals for the past 12 hrs:   Temp Pulse Resp BP SpO2   04/08/17 1800 - 67 16 125/74 -   04/08/17 1700 - 66 21 127/90 -   04/08/17 1645 97.2 °F (36.2 °C) 69 16 122/69 100 %   04/08/17 1600 - 70 22 123/72 -   04/08/17 1521 97.9 °F (36.6 °C) 71 16 131/76 100 %   04/08/17 1500 - 68 16 123/79 100 %   04/08/17 1445 - 74 22 121/74 -   04/08/17 1430 - 78 15 155/77 -   04/08/17 1415 - 69 20 137/78 -   04/08/17 1400 - 65 21 - 96 %   04/08/17 1345 - 63 23 138/67 99 %   04/08/17 1330 - 67 13 132/61 99 %   04/08/17 1315 - 69 22 137/63 -   04/08/17 1300 - 73 10 141/64 -   04/08/17 1230 - 68 15 127/59 97 %   04/08/17 1215 - 70 14 143/61 98 %   04/08/17 1200 - 74 11 139/71 -   04/08/17 1145 - 74 18 142/75 99 %   04/08/17 1130 - 79 16 152/73 94 %   04/08/17 1043 97.3 °F (36.3 °C) 83 16 137/67 98 %              Physical Exam   Constitutional: He is oriented to person, place, and time. He appears well-developed and well-nourished. No distress. HENT:   Head: Normocephalic and atraumatic. Right Ear: External ear normal.   Left Ear: External ear normal.   Neck: Neck supple. Cardiovascular: Normal rate, regular rhythm, normal heart sounds and intact distal pulses. Exam reveals no gallop and no friction rub. No murmur heard. Pulmonary/Chest: Effort normal and breath sounds normal. No stridor. No respiratory distress. He has no wheezes. He has no rales. He exhibits no tenderness. Abdominal: Soft. Bowel sounds are normal. He exhibits no distension and no mass. There is no tenderness. There is no rebound and no guarding. Genitourinary: Rectal exam shows guaiac positive stool. Genitourinary Comments: Grossly heme    Musculoskeletal: Normal range of motion. He exhibits no edema, tenderness or deformity. Neurological: He is alert and oriented to person, place, and time. No cranial nerve deficit. Coordination normal.   Skin: No rash noted. No erythema. No pallor. Psychiatric: He has a normal mood and affect. His behavior is normal.   Nursing note and vitals reviewed. [facility]  ED Course       Procedures  Procedure Note - Rectal Exam:   10:55 AM  Performed by: Diana Rendon. MACKENZIE Jenkins  Chaperoned by: Kyung Pallas, RN  Rectal exam performed. Dark redish stool was collected. Stool was Hemoccult tested, and found to be heme Positive. Per lab  The procedure took 1-15 minutes, and pt tolerated well. Diana Rendon. MACKENZIE Jenkins      11:02 AM  Discussed pt, sx, hx and current findings with Dr Yudy Barker. He is in agreement with plan and will see pt  Diana Rendon. MACKEZNIE Jenkins      1:58 PM  Diana Rendon. MACKENZIE Jenkins spoke with Dr. Татьяна Jewell, Consult for [facility]. Discussed available diagnostic tests and clinical findings. He is in agreement with care plans as outlined. He will admit   Dionisio Gandhi PA-C      LABORATORY TESTS:  Recent Results (from the past 12 hour(s))   CBC WITH AUTOMATED DIFF    Collection Time: 04/08/17 11:17 AM   Result Value Ref Range    WBC 6.2 4.1 - 11.1 K/uL    RBC 4.57 4.10 - 5.70 M/uL    HGB 14.6 12.1 - 17.0 g/dL    HCT 43.9 36.6 - 50.3 %    MCV 96.1 80.0 - 99.0 FL    MCH 31.9 26.0 - 34.0 PG    MCHC 33.3 30.0 - 36.5 g/dL    RDW 13.6 11.5 - 14.5 %    PLATELET 004 (L) 256 - 400 K/uL    NEUTROPHILS 77 (H) 32 - 75 %    LYMPHOCYTES 15 12 - 49 %    MONOCYTES 7 5 - 13 %    EOSINOPHILS 1 0 - 7 %    BASOPHILS 0 0 - 1 %    ABS. NEUTROPHILS 4.8 1.8 - 8.0 K/UL    ABS.  LYMPHOCYTES 0.9 0.8 - 3.5 K/UL    ABS. MONOCYTES 0.5 0.0 - 1.0 K/UL    ABS. EOSINOPHILS 0.0 0.0 - 0.4 K/UL    ABS. BASOPHILS 0.0 0.0 - 0.1 K/UL   METABOLIC PANEL, COMPREHENSIVE    Collection Time: 04/08/17 11:17 AM   Result Value Ref Range    Sodium 135 (L) 136 - 145 mmol/L    Potassium 3.9 3.5 - 5.1 mmol/L    Chloride 100 97 - 108 mmol/L    CO2 28 21 - 32 mmol/L    Anion gap 7 5 - 15 mmol/L    Glucose 122 (H) 65 - 100 mg/dL    BUN 10 6 - 20 MG/DL    Creatinine 0.81 0.70 - 1.30 MG/DL    BUN/Creatinine ratio 12 12 - 20      GFR est AA >60 >60 ml/min/1.73m2    GFR est non-AA >60 >60 ml/min/1.73m2    Calcium 8.8 8.5 - 10.1 MG/DL    Bilirubin, total 0.8 0.2 - 1.0 MG/DL    ALT (SGPT) 12 12 - 78 U/L    AST (SGOT) 12 (L) 15 - 37 U/L    Alk.  phosphatase 53 45 - 117 U/L    Protein, total 7.6 6.4 - 8.2 g/dL    Albumin 3.6 3.5 - 5.0 g/dL    Globulin 4.0 2.0 - 4.0 g/dL    A-G Ratio 0.9 (L) 1.1 - 2.2     MAGNESIUM    Collection Time: 04/08/17 11:17 AM   Result Value Ref Range    Magnesium 1.7 1.6 - 2.4 mg/dL   TYPE & SCREEN    Collection Time: 04/08/17 11:17 AM   Result Value Ref Range    Crossmatch Expiration 04/11/2017     ABO/Rh(D) B POSITIVE     Antibody screen NEG    LACTIC ACID, PLASMA    Collection Time: 04/08/17 11:17 AM   Result Value Ref Range    Lactic acid 1.5 0.4 - 2.0 MMOL/L   PROTHROMBIN TIME + INR    Collection Time: 04/08/17 11:17 AM   Result Value Ref Range    INR 1.1 0.9 - 1.1      Prothrombin time 11.2 (H) 9.0 - 11.1 sec   PTT    Collection Time: 04/08/17 11:17 AM   Result Value Ref Range    aPTT 33.6 (H) 22.1 - 32.5 sec    aPTT, therapeutic range     58.0 - 77.0 SECS   OCCULT BLOOD, STOOL    Collection Time: 04/08/17 11:17 AM   Result Value Ref Range    Occult blood, stool POSITIVE (A) NEG     URINALYSIS W/ RFLX MICROSCOPIC    Collection Time: 04/08/17 11:27 AM   Result Value Ref Range    Color YELLOW/STRAW      Appearance CLEAR CLEAR      Specific gravity 1.008 1.003 - 1.030      pH (UA) 6.5 5.0 - 8.0      Protein NEGATIVE  NEG mg/dL    Glucose NEGATIVE  NEG mg/dL    Ketone NEGATIVE  NEG mg/dL    Bilirubin NEGATIVE  NEG      Blood NEGATIVE  NEG      Urobilinogen 0.2 0.2 - 1.0 EU/dL    Nitrites NEGATIVE  NEG      Leukocyte Esterase NEGATIVE  NEG         IMAGING RESULTS:  CT ABD PELV W WO CONT   Final Result        Study Result      INDICATION: Acute rectal bleeding with abdominal discomfort     COMPARISON: None     TECHNIQUE:   Thin axial images were obtained through the abdomen and pelvis with and without  intravenous iodinated contrast administration. Coronal and sagittal  reconstructions were generated. 3-D images were generated. Oral contrast was not  administered. CT dose reduction was achieved through use of a standardized  protocol tailored for this examination and automatic exposure control for dose  modulation.      FINDINGS:      LIVER: A couple low-density left hepatic lobe lesions too small to characterize. Minimal intrahepatic biliary ductal prominence with left hepatic atrophy of  uncertain clinical significance. The common bile duct is not dilated. GALLBLADDER: Probable sludge without calcified stone. SPLEEN: Unremarkable  PANCREAS: No mass or ductal dilatation. ADRENALS: Moderate nonspecific thickening  KIDNEYS/URETERS: Normal symmetric nephrograms with bilateral renal cysts and  other low-density lesions too small to characterize. No evidence for any  enhancing renal mass. No hydronephrosis or hydroureter. PERITONEUM: No abdominal lymphadenopathy or ascites. COLON: No evidence for acute gastrointestinal hemorrhage. Extensive colonic  diverticulosis without evidence for acute diverticulitis. Questionable  nonobstructing mass within the colonic hepatic flexure (series 6, image 74). APPENDIX: Unremarkable. SMALL BOWEL: Right inguinal hernia containing nondilated segment of distal small  bowel. No bowel obstruction. No evidence for small bowel hemorrhage  STOMACH: Unremarkable.   PELVIS: No pelvic lymphadenopathy. Trace pelvic free fluid. No significant  bladder abnormality. BONES: Moderate lumbosacral spondylosis most pronounced at L5-S1. No acute  osseous abnormality. VISUALIZED THORAX: Bibasilar bronchiectasis likely due to chronic inflammation. Dependent atelectasis in the lungs. ADDITIONAL COMMENTS: N/A     IMPRESSION  IMPRESSION:     No evidence for acute gastrointestinal hemorrhage. Extensive colonic  diverticulosis without evidence for diverticulitis. Trace pelvic free fluid of  uncertain significance.     Questionable nonobstructing mass within the colonic hepatic flexure. Recommend  nonemergent colonoscopy to exclude colon cancer.     Right inguinal hernia containing nondilated segment of distal small bowel. No  bowel obstruction.     Please refer to above findings for complete details.     23X       MEDICATIONS GIVEN:  Medications   pantoprazole (PROTONIX) injection 80 mg (80 mg IntraVENous Given 4/8/17 9043)   iopamidol (ISOVUE-370) 76 % injection 100 mL (100 mL IntraVENous Given 4/8/17 1256)       IMPRESSION:  1. Rectal bleeding    2. Diverticulosis large intestine w/o perforation or abscess w/bleeding    3. Colonic mass        PLAN:  1. Admit to hospital    1:58 PM   Pt is being admitted to the hospital by [facility]. All available results and reasons for admission have been discussed with pt and/or available family. Pt and/or family convey agreement and understanding of need for admission and of admission diagnosis.

## 2017-04-08 NOTE — ED TRIAGE NOTES
Noticed some red blood mixed with stool this am. Denies any history of GIB or hemorrhoids. Does not take any thinners but has been taking \"a lot\" of ASA recently for shoulder pain.

## 2017-04-08 NOTE — ED NOTES
TRANSFER - OUT REPORT:    Verbal report given to Julia Voss on Priscila Robles  being transferred to [facility] for routine progression of care       Report consisted of patients Situation, Background, Assessment and   Recommendations(SBAR). Information from the following report(s) SBAR, ED Summary, [facility] and Recent Results was reviewed with the receiving nurse. Opportunity for questions and clarification was provided.

## 2017-04-08 NOTE — PROGRESS NOTES
[facility]: MED RECONCILIATION    Comments/Recommendations:    Patient had an old prescription for naproxen 375 mg tablets, so he took one for the past two nights to try and help his shoulder. He does not typically take this (prescribed back in August)    Medications added:   · acetaminophen    Medications removed:  · Triamcinolone cream    Medications adjusted:  · Finasteride and tamsulosin to evening    Information obtained from: Patient    Significant PMH/Disease States:   Past Medical History:   Diagnosis Date    Allergic rhinitis     Benign prostatic hypertrophy     Erectile dysfunction     Erythrocytosis     Hypertension     Polyp, colon      Chief Complaint for this Admission:   Chief Complaint   Patient presents with    Melena     Allergies: Review of patient's allergies indicates no known allergies. Prior to Admission Medications:     Prior to Admission Medications   Prescriptions Last Dose Informant Patient Reported? Taking?   acetaminophen (TYLENOL) 650 mg CR tablet 4/7/2017 at HS Self Yes Yes   Sig: Take 650 mg by mouth nightly. aspirin 81 mg Tab 4/8/2017 at AM Self Yes Yes   Sig: Take 81 mg by mouth daily. cholecalciferol (VITAMIN D-3) 400 unit Tab tablet 4/8/2017 at 0800 Self Yes Yes   Sig: Take 400 Units by mouth daily. enalapril (VASOTEC) 10 mg tablet 4/8/2017 at 0800 Self No Yes   Sig: TAKE ONE TABLET BY MOUTH ONCE DAILY   finasteride (PROSCAR) 5 mg tablet 4/7/2017 at 1730 Self Yes Yes   Sig: Take 5 mg by mouth daily (after dinner). hydroCHLOROthiazide (HYDRODIURIL) 25 mg tablet 4/8/2017 at 0800 Self No Yes   Sig: TAKE ONE TABLET BY MOUTH ONCE DAILY (GENERIC FOR HYDRODIURIL)   metFORMIN (GLUCOPHAGE) 500 mg tablet 4/8/2017 at 0800 Self No Yes   Sig: TAKE ONE-HALF TABLET BY MOUTH TWICE DAILY WITH MEALS   simvastatin (ZOCOR) 20 mg tablet 4/7/2017 at HS Self No Yes   Sig: Take 1 Tab by mouth nightly.    tamsulosin (FLOMAX) 0.4 mg capsule 4/7/2017 at 1730 Self Yes Yes   Sig: Take 0.4 mg by mouth daily (after dinner).         Thank you,  Kandy Grier, PharmD, [facility]

## 2017-04-08 NOTE — IP AVS SNAPSHOT
[address] [place] [address] 
[phone] Patient: Flash Ramirez MRN: [id number] :[id number] You are allergic to the following No active allergies Recent Documentation Height Weight BMI Smoking Status 1.753 m 78.9 kg 25.7 kg/m2 Former Smoker Emergency Contacts Name Discharge Info Relation Home Work Mobile [address] CAREGIVER [3] Spouse [3] [phone] Flor Terry DISCHARGE CAREGIVER [3] Other Relative [6] [phone] About your hospitalization You were admitted on:  2017 You last received care in the:  [facility]  MED SURG 2 You were discharged on:  April 10, 2017 Unit phone number:  [phone] Why you were hospitalized Your primary diagnosis was:  Not on File Your diagnoses also included:  Gi Bleed, Hypertension, Hyperlipidemia, Diabetes (Hcc), 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 1407 [facility] MATTHEW ELIZABETH & PINA ASTUDILLO [facility] [address] [address] [phone] Your Appointments 2017 11:30 AM EDT TRANSITIONAL CARE MANAGEMENT with Vy Escalante MD  
704 [facility] ([address]) [address] [facility] [address] [address] [phone] Friday May 12, 2017  8:00 AM EDT ROUTINE CARE with Daron Goodpasture, MD  
704 [facility] (3651 [facility]) [address] [place] [address]  
[phone] Current Discharge Medication List  
  
CONTINUE these medications which have NOT CHANGED Dose & Instructions Dispensing Information Comments Morning Noon Evening Bedtime  
 acetaminophen 650 mg CR tablet Commonly known as:  TYLENOL Your last dose was: Your next dose is:    
   
   
 Dose:  650 mg Take 650 mg by mouth nightly. Refills:  0  
     
   
   
   
  
 aspirin 81 mg tablet Your last dose was: Your next dose is:    
   
   
 Dose:  81 mg Take 81 mg by mouth daily. Refills:  0  
     
   
   
   
  
 enalapril 10 mg tablet Commonly known as:  Elyn Bonilla Your last dose was: Your next dose is: TAKE ONE TABLET BY MOUTH ONCE DAILY Quantity:  90 Tab Refills:  2  
     
   
   
   
  
 finasteride 5 mg tablet Commonly known as:  PROSCAR Your last dose was: Your next dose is:    
   
   
 Dose:  5 mg Take 5 mg by mouth daily (after dinner). Refills:  0  
     
   
   
   
  
 hydroCHLOROthiazide 25 mg tablet Commonly known as:  HYDRODIURIL Your last dose was: Your next dose is: TAKE ONE TABLET BY MOUTH ONCE DAILY (GENERIC FOR HYDRODIURIL) Quantity:  90 Tab Refills:  3  
     
   
   
   
  
 metFORMIN 500 mg tablet Commonly known as:  GLUCOPHAGE Your last dose was: Your next dose is: TAKE ONE-HALF TABLET BY MOUTH TWICE DAILY WITH MEALS Quantity:  90 Tab Refills:  0  
     
   
   
   
  
 simvastatin 20 mg tablet Commonly known as:  ZOCOR Your last dose was: Your next dose is:    
   
   
 Dose:  20 mg Take 1 Tab by mouth nightly. Quantity:  90 Tab Refills:  3 tamsulosin 0.4 mg capsule Commonly known as:  FLOMAX Your last dose was: Your next dose is:    
   
   
 Dose:  0.4 mg Take 0.4 mg by mouth daily (after dinner). Refills:  0  
     
   
   
   
  
 VITAMIN D3 400 unit Tab tablet Generic drug:  cholecalciferol Your last dose was: Your next dose is:    
   
   
 Dose:  400 Units Take 400 Units by mouth daily. Refills:  0 Discharge Instructions HOME DISCHARGE INSTRUCTIONS [place]ia Morel / [id number] : 1927 Admission date: 2017 Discharge date: 4/10/2017 Please bring this form with you to show your care provider at your follow-up appointment. Primary care provider:  Curtis Milian MD 
 
Discharging provider:  Marcelina Angelucci, MD  - Family Medicine Resident Fly Bach MD - Attending, Family Medicine You have been admitted to the hospital with the following diagnoses: 
 
ACUTE DIAGNOSES: 
GI bleed 
abdominal pain Cecshalinia William . . . . . . . . . . . . . . . . . . . . . . . . . . . . . . . . . . . . . . . . . . . . . . . . . . . . . . . . . . . . . . . . . . . . . . Ceclia William FOLLOW-UP CARE RECOMMENDATIONS: 
 
Appointments Follow-up Information Follow up With Details Comments Contact Info Curtis Milian MD On 2017 11:30 am for hospital follow up. 1407 [facility] [address] [place] [address] 
[phone] Follow-up tests needed: Repeat CBC as outpatient. Pending test results: At the time of your discharge the following test results are still pending: None. Please make sure you review these results with your outpatient follow-up provider(s). Specific symptoms to watch for: chest pain, shortness of breath, fever, chills, nausea, vomiting, diarrhea, change in mentation, falling, weakness, bleeding. DIET/what to eat:  High Fiber Diabetic Diet, low sodium. ACTIVITY:  Activity as tolerated What to do if new or unexpected symptoms occur? If you experience any of the above symptoms (or should other concerns or questions arise after discharge) please call your primary care physician. Return to the emergency room if you cannot get hold of your doctor. · It is very important that you keep your follow-up appointment(s). · Please bring discharge papers, medication list (and/or medication bottles) to your follow-up appointments for review by your outpatient provider(s). · Please check the list of medications and be sure it includes every medication (even non-prescription medications) that your provider wants you to take. · It is important that you take the medication exactly as they are prescribed. · Keep your medication in the bottles provided by the pharmacist and keep a list of the medication names, dosages, and times to be taken in your wallet. · Do not take other medications without consulting your doctor. · If you have any questions about your medications or other instructions, please talk to your nurse or care provider before you leave the hospital.  
 
Information obtained by:  
 
I understand that if any problems occur once I am at home I am to contact my physician. These instructions were explained to me and I had the opportunity to ask questions. I understand and acknowledge receipt of the instructions indicated above. Physician's or R.N.'s Signature                                                                  Date/Time Patient or Representative Signature                                                          Date/Time 
 
   
Diverticulosis: Care Instructions Your Care Instructions In diverticulosis, pouches called diverticula form in the wall of the large intestine (colon). The pouches do not cause any pain or other symptoms. Most people who have diverticulosis do not know they have it. But the pouches sometimes bleed, and if they become infected, they can cause pain and other symptoms. When this happens, it is called diverticulitis. Diverticula form when pressure pushes the wall of the colon outward at certain weak points. A diet that is too low in fiber can cause diverticula. Follow-up care is a key part of your treatment and safety. Be sure to make and go to all appointments, and call your doctor if you are having problems. It's also a good idea to know your test results and keep a list of the medicines you take. How can you care for yourself at home? · Include fruits, leafy green vegetables, beans, and whole grains in your diet each day. These foods are high in fiber. · Take a fiber supplement, such as Citrucel or Metamucil, every day if needed. Read and follow all instructions on the label. · Drink plenty of fluids, enough so that your urine is light yellow or clear like water. If you have kidney, heart, or liver disease and have to limit fluids, talk with your doctor before you increase the amount of fluids you drink. · Get at least 30 minutes of exercise on most days of the week. Walking is a good choice. You also may want to do other activities, such as running, swimming, cycling, or playing tennis or team sports. · Cut out foods that cause gas, pain, or other symptoms. When should you call for help? Call your doctor now or seek immediate medical care if: 
· You have belly pain. · You pass maroon or very bloody stools. · You have a fever. · You have nausea and vomiting. · You have unusual changes in your bowel movements or abdominal swelling. · You have burning pain when you urinate. · You have abnormal vaginal discharge. · You have shoulder pain. · You have cramping pain that does not get better when you have a bowel movement or pass gas. · You pass gas or stool from your urethra while urinating. Watch closely for changes in your health, and be sure to contact your doctor if you have any problems. Where can you learn more? Go to http://marylu-jennifer.info/. Enter S351 in the search box to learn more about \"Diverticulosis: Care Instructions. \" Current as of: August 9, 2016 Content Version: 11.2 © 8301-8264 [employer]. Care instructions adapted under license by [employer] (which disclaims liability or warranty for this information). If you have questions about a medical condition or this instruction, always ask your healthcare professional. [place] [address] any warranty or liability for your use of this information. Discharge Orders None ACO Transitions of Care Introducing [employer] [address] offers a voluntary care coordination program to provide high quality service and care to [facility] fee-for-service beneficiaries. Bipin Parra was designed to help you enhance your health and well-being through the following services: ? Transitions of Care  support for individuals who are transitioning from one care setting to another (example: Hospital to home). ? Chronic and Complex Care Coordination  support for individuals and caregivers of those with serious or chronic illnesses or with more than one chronic (ongoing) condition and those who take a number of different medications. If you meet specific medical criteria, a [address] [place] [address] Rd may call you directly to coordinate your care with your primary care physician and your other care providers. For questions about the [facility] programs, please, contact your physicians office. For general questions or additional information about Accountable Care Organizations: 
Please visit www.medicare.gov/acos. html or call 1-800-MEDICARE (8-681.729.7781) TTY users should call 9-694.383.6725. [employer] Announcement We are excited to announce that we are making your provider's discharge notes available to you in [employer]. You will see these notes when they are completed and signed by the physician that discharged you from your recent hospital stay. If you have any questions or concerns about any information you see in [employer], please call the Health Information Department where you were seen or reach out to your Primary Care Provider for more information about your plan of care. Introducing [facility]! Hunter Varela introduces [employer] patient portal. Now you can access parts of your medical record, email your doctor's office, and request medication refills online. 1. In your internet browser, go to [email or web address] 2. Click on the First Time User? Click Here link in the Sign In box. You will see the New Member Sign Up page. 3. Enter your [employer] Access Code exactly as it appears below. You will not need to use this code after youve completed the sign-up process. If you do not sign up before the expiration date, you must request a new code. · [employer] Access Code: [email or web address] Expires: 6/18/2017  1:17 PM 
 
4. Enter the last four digits of your Social Security Number (xxxx) and Date of Birth (mm/dd/yyyy) as indicated and click Submit. You will be taken to the next sign-up page. 5. Create a [employer] ID. This will be your [employer] login ID and cannot be changed, so think of one that is secure and easy to remember. 6. Create a [employer] password. You can change your password at any time. 7. Enter your Password Reset Question and Answer. This can be used at a later time if you forget your password. 8. Enter your e-mail address. You will receive e-mail notification when new information is available in [address] 9. Click Sign Up. You can now view and download portions of your medical record. 10. Click the Download Summary menu link to download a portable copy of your medical information. If you have questions, please visit the Frequently Asked Questions section of the [employer] website. Remember, [employer] is NOT to be used for urgent needs. For medical emergencies, dial 911. Now available from your iPhone and Android! General Information Please provide this summary of care documentation to your next provider. Patient Signature:  ____________________________________________________________ Date:  ____________________________________________________________  
  
[facility] Provider Signature:  ____________________________________________________________ Date:  ____________________________________________________________

## 2017-04-08 NOTE — PROGRESS NOTES
Primary Nurse Rmoeo Lagunas RN and Manish Hanley RN performed a dual skin assessment on this patient No impairment noted  Miguel score is 20

## 2017-04-09 LAB
ALBUMIN SERPL BCP-MCNC: 2.9 G/DL (ref 3.5–5)
ALBUMIN/GLOB SERPL: 0.8 {RATIO} (ref 1.1–2.2)
ALP SERPL-CCNC: 47 U/L (ref 45–117)
ALT SERPL-CCNC: 13 U/L (ref 12–78)
ANION GAP BLD CALC-SCNC: 10 MMOL/L (ref 5–15)
AST SERPL W P-5'-P-CCNC: 15 U/L (ref 15–37)
BILIRUB SERPL-MCNC: 1.1 MG/DL (ref 0.2–1)
BUN SERPL-MCNC: 5 MG/DL (ref 6–20)
BUN/CREAT SERPL: 7 (ref 12–20)
CALCIUM SERPL-MCNC: 8.4 MG/DL (ref 8.5–10.1)
CHLORIDE SERPL-SCNC: 105 MMOL/L (ref 97–108)
CO2 SERPL-SCNC: 23 MMOL/L (ref 21–32)
CREAT SERPL-MCNC: 0.69 MG/DL (ref 0.7–1.3)
ERYTHROCYTE [DISTWIDTH] IN BLOOD BY AUTOMATED COUNT: 13.4 % (ref 11.5–14.5)
GLOBULIN SER CALC-MCNC: 3.8 G/DL (ref 2–4)
GLUCOSE BLD STRIP.AUTO-MCNC: 134 MG/DL (ref 65–100)
GLUCOSE BLD STRIP.AUTO-MCNC: 77 MG/DL (ref 65–100)
GLUCOSE BLD STRIP.AUTO-MCNC: 78 MG/DL (ref 65–100)
GLUCOSE BLD STRIP.AUTO-MCNC: 79 MG/DL (ref 65–100)
GLUCOSE BLD STRIP.AUTO-MCNC: 83 MG/DL (ref 65–100)
GLUCOSE BLD STRIP.AUTO-MCNC: 84 MG/DL (ref 65–100)
GLUCOSE BLD STRIP.AUTO-MCNC: 88 MG/DL (ref 65–100)
GLUCOSE SERPL-MCNC: 172 MG/DL (ref 65–100)
HCT VFR BLD AUTO: 41.1 % (ref 36.6–50.3)
HCT VFR BLD AUTO: 41.2 % (ref 36.6–50.3)
HGB BLD-MCNC: 14.2 G/DL (ref 12.1–17)
HGB BLD-MCNC: 14.4 G/DL (ref 12.1–17)
MCH RBC QN AUTO: 33 PG (ref 26–34)
MCHC RBC AUTO-ENTMCNC: 35 G/DL (ref 30–36.5)
MCV RBC AUTO: 94.3 FL (ref 80–99)
PLATELET # BLD AUTO: 134 K/UL (ref 150–400)
POTASSIUM SERPL-SCNC: 3.8 MMOL/L (ref 3.5–5.1)
PROT SERPL-MCNC: 6.7 G/DL (ref 6.4–8.2)
RBC # BLD AUTO: 4.36 M/UL (ref 4.1–5.7)
SERVICE CMNT-IMP: ABNORMAL
SERVICE CMNT-IMP: NORMAL
SODIUM SERPL-SCNC: 138 MMOL/L (ref 136–145)
WBC # BLD AUTO: 5.4 K/UL (ref 4.1–11.1)

## 2017-04-09 PROCEDURE — 85018 HEMOGLOBIN: CPT | Performed by: HOSPITALIST

## 2017-04-09 PROCEDURE — 82962 GLUCOSE BLOOD TEST: CPT

## 2017-04-09 PROCEDURE — 80053 COMPREHEN METABOLIC PANEL: CPT

## 2017-04-09 PROCEDURE — 74011000250 HC RX REV CODE- 250: Performed by: SPECIALIST

## 2017-04-09 PROCEDURE — 74011000250 HC RX REV CODE- 250: Performed by: FAMILY MEDICINE

## 2017-04-09 PROCEDURE — C9113 INJ PANTOPRAZOLE SODIUM, VIA: HCPCS | Performed by: FAMILY MEDICINE

## 2017-04-09 PROCEDURE — 74011250636 HC RX REV CODE- 250/636: Performed by: FAMILY MEDICINE

## 2017-04-09 PROCEDURE — 85027 COMPLETE CBC AUTOMATED: CPT

## 2017-04-09 PROCEDURE — 65660000000 HC RM CCU STEPDOWN

## 2017-04-09 PROCEDURE — 36415 COLL VENOUS BLD VENIPUNCTURE: CPT

## 2017-04-09 RX ORDER — INSULIN LISPRO 100 [IU]/ML
INJECTION, SOLUTION INTRAVENOUS; SUBCUTANEOUS
Status: DISCONTINUED | OUTPATIENT
Start: 2017-04-09 | End: 2017-04-10 | Stop reason: HOSPADM

## 2017-04-09 RX ADMIN — DEXTROSE MONOHYDRATE 25 G: 25 INJECTION, SOLUTION INTRAVENOUS at 04:53

## 2017-04-09 RX ADMIN — SODIUM CHLORIDE 125 ML/HR: 900 INJECTION, SOLUTION INTRAVENOUS at 07:20

## 2017-04-09 RX ADMIN — Medication 10 ML: at 21:52

## 2017-04-09 RX ADMIN — PANTOPRAZOLE SODIUM 40 MG: 40 INJECTION, POWDER, FOR SOLUTION INTRAVENOUS at 21:51

## 2017-04-09 RX ADMIN — Medication 10 ML: at 05:00

## 2017-04-09 RX ADMIN — POLYETHYLENE GLYCOL-3350 AND ELECTROLYTES 4000 ML: 236; 6.74; 5.86; 2.97; 22.74 POWDER, FOR SOLUTION ORAL at 15:33

## 2017-04-09 RX ADMIN — PANTOPRAZOLE SODIUM 40 MG: 40 INJECTION, POWDER, FOR SOLUTION INTRAVENOUS at 08:18

## 2017-04-09 NOTE — CONSULTS
Gastroenterology Consult     Referring Physician: Pao Yang Date: 4/9/2017     Subjective:     Chief Complaint: rectal bleed    History of Present Illness: Mckayla Berkowitz is a 80 y.o. male who is seen in consultation for rectal bleed and mass on CT. Denies pain just saw small quantity BRBPR. No fever or chills. Ct show \"possible mass\" in colon. Last colon was 6 yr ago by [employer]. Past Medical History:   Diagnosis Date    Allergic rhinitis     Benign prostatic hypertrophy     Erectile dysfunction     Erythrocytosis     Hypertension     Polyp, colon      History reviewed. No pertinent surgical history. Family History   Problem Relation Age of Onset    Cancer Mother     Cancer Brother      lung    Heart Disease Brother      Social History   Substance Use Topics    Smoking status: Former Smoker     Packs/day: 1.00     Years: 15.00    Smokeless tobacco: Never Used    Alcohol use No      No Known Allergies  Current Facility-Administered Medications   Medication Dose Route Frequency    insulin lispro (HUMALOG) injection   SubCUTAneous AC&HS    sodium chloride (NS) flush 5-10 mL  5-10 mL IntraVENous Q8H    sodium chloride (NS) flush 5-10 mL  5-10 mL IntraVENous PRN    acetaminophen (TYLENOL) tablet 650 mg  650 mg Oral Q4H PRN    glucose chewable tablet 16 g  4 Tab Oral PRN    dextrose (D50W) injection syrg 12.5-25 g  12.5-25 g IntraVENous PRN    glucagon (GLUCAGEN) injection 1 mg  1 mg IntraMUSCular PRN    pantoprazole (PROTONIX) injection 40 mg  40 mg IntraVENous Q12H    0.9% sodium chloride infusion 250 mL  250 mL IntraVENous PRN        Review of Systems:  A detailed 10 organ review of systems is obtained with pertinent positives as listed in the History of Present Illness and Past Medical History. All others are negative.     Objective:     Physical Exam:  Visit Vitals    /79 (BP 1 Location: Right arm, BP Patient Position: Sitting)    Pulse 83    Temp 97.4 °F (36.3 °C)    Resp 15    Ht 5' 9\" (1.753 m)    Wt 78.9 kg (174 lb)    SpO2 99%    BMI 25.7 kg/m2        Skin:  Extremities and face reveal no rashes. No jc erythema. No telangiectasias on the chest wall. HEENT: Sclerae anicteric. Extra-occular muscles are intact. No oral ulcers. No abnormal pigmentation of the lips. The neck is supple. Cardiovascular: Regular rate and rhythm. No murmurs, gallops, or rubs. PMI nondisplaced. Carotids without bruits. Respiratory:  Comfortable breathing with no accessory muscle use. Clear breath sounds with no wheezes, rales, or rhonchi. GI:  Abdomen nondistended, soft, and nontender. Normal active bowel sounds. No enlargement of the liver or spleen. No masses palpable. Rectal:  Deferred  Musculoskeletal:  No pitting edema of the lower legs. Extremities have good range of motion. No costovertebral tenderness. Neurological:  Gross memory appears intact. Patient is alert and oriented. Psychiatric:  Mood appears appropriate with judgement intact. Lymphatic:  No cervical or supraclavicular adenopathy.     Lab/Data Review:  CMP:   Lab Results   Component Value Date/Time     04/09/2017 05:13 AM    K 3.8 04/09/2017 05:13 AM     04/09/2017 05:13 AM    CO2 23 04/09/2017 05:13 AM    AGAP 10 04/09/2017 05:13 AM     (H) 04/09/2017 05:13 AM    BUN 5 (L) 04/09/2017 05:13 AM    CREA 0.69 (L) 04/09/2017 05:13 AM    GFRAA >60 04/09/2017 05:13 AM    GFRNA >60 04/09/2017 05:13 AM    CA 8.4 (L) 04/09/2017 05:13 AM    ALB 2.9 (L) 04/09/2017 05:13 AM    TP 6.7 04/09/2017 05:13 AM    GLOB 3.8 04/09/2017 05:13 AM    AGRAT 0.8 (L) 04/09/2017 05:13 AM    SGOT 15 04/09/2017 05:13 AM    ALT 13 04/09/2017 05:13 AM     CBC:   Lab Results   Component Value Date/Time    WBC 5.4 04/09/2017 05:13 AM    HGB 14.2 04/09/2017 12:32 PM    HCT 41.2 04/09/2017 12:32 PM     (L) 04/09/2017 05:13 AM         Assessment/Plan:     Rectal bleed  Hemorrhoids   diverticulosis     Colon in am

## 2017-04-09 NOTE — PROGRESS NOTES
Bedside and Verbal shift change report given to [address] [place] [address]  (oncoming nurse) by Tahira Carvalho (offgoing nurse). Report included the following information SBAR, Kardex and MAR.

## 2017-04-09 NOTE — ROUTINE PROCESS
Primary Nurse Brisa Hickman RN and Jaqueline Vail RN performed a dual skin assessment on this patient No impairment noted  Miguel score is 22

## 2017-04-09 NOTE — PROGRESS NOTES
[address] [place] [address]   Office [phone]  Fax [phone]          Assessment and Plan     Mckayla Berkowitz is a 80 y.o. male admitted for GI Bleed. He has spent 1 night(s) in the hospital.    24 Hour Events: No more bowel movements. GI Bleed - patient presented with history of bloody stool. FOBT+. Hemodynamically stable and Hgb has been stable. Patient gives history of heavy NSAID use over the course of the last week. Last colonoscopy was done 2012--showed right colon polyps and left colon diverticulosis. - CT here shows possible sigmoid mass, colonoscopy recommended. - Consulting GI, appreciate their recs. - Hgb stable and no more BMs, making it q12 hours now. - 2 large-bore IVs have been in place.  - IV protonix, 40mg BID.  - Per attending, clear liquid diet started. Hypertension  - BP has been very stable, well controlled in the 120's to 130's. - Thus, will restart home BP meds when needed but will hold for now.      Diabetes Mellitus T2  - Last HgA1c 5.8 in 2017.   - Discontinued home metformin. .  - Insulin Sliding Scale normal sensitivity  - Hypoglycemia protocols ordered.     Hyperlipidemia - last lipid panel (2017): Cholesterol 135, HDL 50, LDL 73, TG 60.  -Holding home Zocor as patient is NPO. FEN/GI - Clear liquid while he awaits GI. Activity - Ambulate as tolerated  DVT prophylaxis - SCDs  GI prophylaxis - Protonix  Fall prophylaxis - Not indicated at this time. IV Access - 2 large bore IVs.  Disposition - Plan to d/c to TBD. Code Status - Full    I appreciate the opportunity to participate in the care of this patient,  Pedro Sosa MD  [facility] Medicine Resident         Subjective / Objective     Subjective:  Symptoms:  Stable. No shortness of breath, malaise, chest pain or weakness. Diet:  No nausea or vomiting. Activity level: Normal.    Pain:  He reports no pain.         Temp (24hrs), Av.8 °F (36.6 °C), Min:97.2 °F (36.2 °C), Max:98.4 °F (36.9 °C)     Objective:  General Appearance:  Comfortable, well-appearing, in no acute distress and not in pain. Vital signs: (most recent): Blood pressure 134/79, pulse 83, temperature 97.4 °F (36.3 °C), resp. rate 15, height 5' 9\" (1.753 m), weight 174 lb (78.9 kg), SpO2 99 %. Vital signs are normal.  No fever. Lungs:  Normal respiratory rate and normal effort. Breath sounds clear to auscultation. No wheezes or rhonchi. Heart: Normal rate. Regular rhythm. S1 normal and S2 normal.  No murmur. Extremities: There is no dependent edema. Neurological: Patient is alert and oriented to person, place and time. Skin:  Warm and dry. Abdomen: Abdomen is soft. Bowel sounds are normal.   There is no abdominal tenderness. Respiratory:   O2 Device: Room air     I/O:    Date 04/08/17 0700 - 04/09/17 0659 04/09/17 0700 - 04/10/17 0659   Shift 0676-2346 5052-5857 24 Hour Total 8306-8315 0151-0496 24 Hour Total   I  N  T  A  K  E   P. O. 0  0         P. O. 0  0       I.V.  (mL/kg/hr) 343.8  (0.4)  343.8  (0.2)         Volume (0.9% sodium chloride infusion) 343.8  343.8       Shift Total  (mL/kg) 343.8  (4.4)  343.8  (4.4)      O  U  T  P  U  T   Urine  (mL/kg/hr) 0  (0) 1000  (1.1) 1000  (0.5)         Urine Voided 0 1000 1000         Urine Occurrence(s)    1 x  1 x    Emesis/NG output 0  0         Emesis 0  0       Stool 0  0         Stool Occurrence(s)    1 x  1 x      Stool 0  0       Shift Total  (mL/kg) 0  (0) 1000  (12.7) 1000  (12.7)      .8 -1000 -656. 3      Weight (kg) 78.9 78.9 78.9 78.9 78.9 78.9       CBC:  Recent Labs      04/09/17   0513  04/08/17 2037 04/08/17   1117   WBC  5.4   --   6.2   HGB  14.4  15.0  14.6   HCT  41.1  42.4  43.9   PLT  134*   --   653*       Metabolic Panel:  Recent Labs      04/09/17   0513  04/08/17   1117   NA  138  135*   K  3.8  3.9   CL  105  100   CO2  23  28   BUN  5*  10   CREA  0.69*  0.81   GLU  172*  122*   CA  8.4*  8.8   MG   -- 1. 7   ALB  2.9*  3.6   SGOT  15  12*   ALT  13  12   INR   --   1.1          For Billing    Chief Complaint   Patient presents with   [facility] Problems  Date Reviewed: 4/9/2017          Codes Class Noted POA    GI bleed ICD-10-CM: K92.2  ICD-9-CM: 578.9  4/8/2017 Yes        Type 2 diabetes mellitus with diabetic neuropathy (HCC) [250.60, 357.2] (Chronic) ICD-10-CM: E11.40  ICD-9-CM: 250.60, 357.2  8/5/2015 Yes    Overview Signed 8/5/2015  2:04 PM by Eleonora Ariza MD     mised 1/2 monfilament touches 8/5/15             Diabetes ([facility] [address] (Chronic) ICD-10-CM: E11.9  ICD-9-CM: 250.00  3/10/2014 Yes        Hyperlipidemia (Chronic) ICD-10-CM: E78.5  ICD-9-CM: 272.4  11/16/2010 Yes        Hypertension (Chronic) ICD-10-CM: I10  ICD-9-CM: 401.9  Unknown Yes

## 2017-04-09 NOTE — PROGRESS NOTES
TRANSFER - OUT REPORT:    Verbal report given to MINDI Santiago on Jose M Prince  being transferred to room 524(unit) for routine progression of care       Report consisted of patients Situation, Background, Assessment and   Recommendations(SBAR). Information from the following report(s) SBAR, Procedure Summary, Intake/Output, MAR, Med Rec Status and Cardiac Rhythm NSR was reviewed with the receiving nurse. Lines:   Peripheral IV 04/08/17 Left Antecubital (Active)   Site Assessment Clean, dry, & intact 4/9/2017  7:20 AM   Phlebitis Assessment 0 4/9/2017  7:20 AM   Infiltration Assessment 0 4/9/2017  7:20 AM   Dressing Status Clean, dry, & intact 4/9/2017  7:20 AM   Dressing Type Tape;Transparent 4/9/2017  7:20 AM   Hub Color/Line Status Pink;Capped 4/9/2017  7:20 AM   Alcohol Cap Used Yes 4/9/2017  7:20 AM       Peripheral IV 04/08/17 Right Wrist (Active)   Site Assessment Clean, dry, & intact 4/9/2017  7:20 AM   Phlebitis Assessment 0 4/9/2017  7:20 AM   Infiltration Assessment 0 4/9/2017  7:20 AM   Dressing Status Clean, dry, & intact 4/9/2017  7:20 AM   Dressing Type Tape;Transparent 4/9/2017  7:20 AM   Hub Color/Line Status Pink; Infusing 4/9/2017  7:20 AM   Alcohol Cap Used Yes 4/9/2017  7:20 AM        Opportunity for questions and clarification was provided.       Patient transported with:   Monitor  Tech

## 2017-04-09 NOTE — PROGRESS NOTES
Physical Therapy  Order received and acknowledged. Upon entry into room patient up Independently walking out of bathroom. Educated patient on purpose of PT and patient decline PT services stating \"I've been walking all my life and I walk 3 miles every morning. All I need is to get our of here\". Patient declined needs for any equipment or further services at this time, nursing made aware. Order will be completed. Thank you.     Azeb Cavazos, PT, DPT, CLT

## 2017-04-09 NOTE — ROUTINE PROCESS
Bedside and Verbal shift change report given to Nakia RN (oncoming nurse) by Anibal St RN (offgoing nurse). Report included the following information SBAR, Kardex and Recent Results.

## 2017-04-10 ENCOUNTER — ANESTHESIA EVENT (OUTPATIENT)
Dept: ENDOSCOPY | Age: 82
DRG: 379 | End: 2017-04-10
Payer: MEDICARE

## 2017-04-10 ENCOUNTER — ANESTHESIA (OUTPATIENT)
Dept: ENDOSCOPY | Age: 82
DRG: 379 | End: 2017-04-10
Payer: MEDICARE

## 2017-04-10 VITALS
BODY MASS INDEX: 25.77 KG/M2 | OXYGEN SATURATION: 100 % | DIASTOLIC BLOOD PRESSURE: 82 MMHG | TEMPERATURE: 97.5 F | SYSTOLIC BLOOD PRESSURE: 147 MMHG | HEIGHT: 69 IN | RESPIRATION RATE: 15 BRPM | HEART RATE: 81 BPM | WEIGHT: 174 LBS

## 2017-04-10 LAB
ABO + RH BLD: NORMAL
ALBUMIN SERPL BCP-MCNC: 3.3 G/DL (ref 3.5–5)
ALBUMIN/GLOB SERPL: 0.9 {RATIO} (ref 1.1–2.2)
ALP SERPL-CCNC: 57 U/L (ref 45–117)
ALT SERPL-CCNC: 18 U/L (ref 12–78)
ANION GAP BLD CALC-SCNC: 8 MMOL/L (ref 5–15)
AST SERPL W P-5'-P-CCNC: 22 U/L (ref 15–37)
BILIRUB SERPL-MCNC: 1.1 MG/DL (ref 0.2–1)
BLD PROD TYP BPU: NORMAL
BLD PROD TYP BPU: NORMAL
BLOOD GROUP ANTIBODIES SERPL: NORMAL
BPU ID: NORMAL
BPU ID: NORMAL
BUN SERPL-MCNC: 7 MG/DL (ref 6–20)
BUN/CREAT SERPL: 8 (ref 12–20)
CALCIUM SERPL-MCNC: 8.4 MG/DL (ref 8.5–10.1)
CHLORIDE SERPL-SCNC: 104 MMOL/L (ref 97–108)
CO2 SERPL-SCNC: 29 MMOL/L (ref 21–32)
CREAT SERPL-MCNC: 0.86 MG/DL (ref 0.7–1.3)
CROSSMATCH RESULT,%XM: NORMAL
CROSSMATCH RESULT,%XM: NORMAL
ERYTHROCYTE [DISTWIDTH] IN BLOOD BY AUTOMATED COUNT: 13.3 % (ref 11.5–14.5)
GLOBULIN SER CALC-MCNC: 3.8 G/DL (ref 2–4)
GLUCOSE BLD STRIP.AUTO-MCNC: 167 MG/DL (ref 65–100)
GLUCOSE BLD STRIP.AUTO-MCNC: 88 MG/DL (ref 65–100)
GLUCOSE SERPL-MCNC: 93 MG/DL (ref 65–100)
HCT VFR BLD AUTO: 41.3 % (ref 36.6–50.3)
HGB BLD-MCNC: 14.4 G/DL (ref 12.1–17)
MCH RBC QN AUTO: 32.4 PG (ref 26–34)
MCHC RBC AUTO-ENTMCNC: 34.9 G/DL (ref 30–36.5)
MCV RBC AUTO: 93 FL (ref 80–99)
PLATELET # BLD AUTO: 142 K/UL (ref 150–400)
POTASSIUM SERPL-SCNC: 3.7 MMOL/L (ref 3.5–5.1)
PROT SERPL-MCNC: 7.1 G/DL (ref 6.4–8.2)
RBC # BLD AUTO: 4.44 M/UL (ref 4.1–5.7)
SERVICE CMNT-IMP: ABNORMAL
SERVICE CMNT-IMP: NORMAL
SODIUM SERPL-SCNC: 141 MMOL/L (ref 136–145)
SPECIMEN EXP DATE BLD: NORMAL
STATUS OF UNIT,%ST: NORMAL
STATUS OF UNIT,%ST: NORMAL
UNIT DIVISION, %UDIV: 0
UNIT DIVISION, %UDIV: 0
WBC # BLD AUTO: 4.5 K/UL (ref 4.1–11.1)

## 2017-04-10 PROCEDURE — 36415 COLL VENOUS BLD VENIPUNCTURE: CPT | Performed by: FAMILY MEDICINE

## 2017-04-10 PROCEDURE — 77030011640 HC PAD GRND REM COVD -A: Performed by: INTERNAL MEDICINE

## 2017-04-10 PROCEDURE — 74011250636 HC RX REV CODE- 250/636

## 2017-04-10 PROCEDURE — 76040000019: Performed by: INTERNAL MEDICINE

## 2017-04-10 PROCEDURE — 77030013992 HC SNR POLYP ENDOSC BSC -B: Performed by: INTERNAL MEDICINE

## 2017-04-10 PROCEDURE — 77030009426 HC FCPS BIOP ENDOSC BSC -B: Performed by: INTERNAL MEDICINE

## 2017-04-10 PROCEDURE — 0DBH8ZX EXCISION OF CECUM, VIA NATURAL OR ARTIFICIAL OPENING ENDOSCOPIC, DIAGNOSTIC: ICD-10-PCS | Performed by: INTERNAL MEDICINE

## 2017-04-10 PROCEDURE — 74011636637 HC RX REV CODE- 636/637: Performed by: HOSPITALIST

## 2017-04-10 PROCEDURE — 80053 COMPREHEN METABOLIC PANEL: CPT | Performed by: FAMILY MEDICINE

## 2017-04-10 PROCEDURE — 82962 GLUCOSE BLOOD TEST: CPT

## 2017-04-10 PROCEDURE — 77030010936 HC CLP LIG BSC -C: Performed by: INTERNAL MEDICINE

## 2017-04-10 PROCEDURE — 74011250636 HC RX REV CODE- 250/636: Performed by: FAMILY MEDICINE

## 2017-04-10 PROCEDURE — 76060000031 HC ANESTHESIA FIRST 0.5 HR: Performed by: INTERNAL MEDICINE

## 2017-04-10 PROCEDURE — 88305 TISSUE EXAM BY PATHOLOGIST: CPT | Performed by: FAMILY MEDICINE

## 2017-04-10 PROCEDURE — 85027 COMPLETE CBC AUTOMATED: CPT | Performed by: FAMILY MEDICINE

## 2017-04-10 PROCEDURE — 0DBL8ZX EXCISION OF TRANSVERSE COLON, VIA NATURAL OR ARTIFICIAL OPENING ENDOSCOPIC, DIAGNOSTIC: ICD-10-PCS | Performed by: INTERNAL MEDICINE

## 2017-04-10 PROCEDURE — C9113 INJ PANTOPRAZOLE SODIUM, VIA: HCPCS | Performed by: FAMILY MEDICINE

## 2017-04-10 RX ORDER — SODIUM CHLORIDE 9 MG/ML
INJECTION, SOLUTION INTRAVENOUS
Status: DISCONTINUED | OUTPATIENT
Start: 2017-04-10 | End: 2017-04-10 | Stop reason: HOSPADM

## 2017-04-10 RX ORDER — NALOXONE HYDROCHLORIDE 0.4 MG/ML
0.4 INJECTION, SOLUTION INTRAMUSCULAR; INTRAVENOUS; SUBCUTANEOUS
Status: CANCELLED | OUTPATIENT
Start: 2017-04-10 | End: 2017-04-10

## 2017-04-10 RX ORDER — FLUMAZENIL 0.1 MG/ML
0.2 INJECTION INTRAVENOUS
Status: CANCELLED | OUTPATIENT
Start: 2017-04-10 | End: 2017-04-10

## 2017-04-10 RX ORDER — SODIUM CHLORIDE 9 MG/ML
50 INJECTION, SOLUTION INTRAVENOUS CONTINUOUS
Status: CANCELLED | OUTPATIENT
Start: 2017-04-10 | End: 2017-04-10

## 2017-04-10 RX ORDER — PROPOFOL 10 MG/ML
INJECTION, EMULSION INTRAVENOUS AS NEEDED
Status: DISCONTINUED | OUTPATIENT
Start: 2017-04-10 | End: 2017-04-10 | Stop reason: HOSPADM

## 2017-04-10 RX ORDER — DEXTROMETHORPHAN/PSEUDOEPHED 2.5-7.5/.8
1.2 DROPS ORAL
Status: CANCELLED | OUTPATIENT
Start: 2017-04-10

## 2017-04-10 RX ORDER — MIDAZOLAM HYDROCHLORIDE 1 MG/ML
.25-5 INJECTION, SOLUTION INTRAMUSCULAR; INTRAVENOUS
Status: CANCELLED | OUTPATIENT
Start: 2017-04-10 | End: 2017-04-10

## 2017-04-10 RX ORDER — EPINEPHRINE 0.1 MG/ML
1 INJECTION INTRACARDIAC; INTRAVENOUS
Status: CANCELLED | OUTPATIENT
Start: 2017-04-10 | End: 2017-04-10

## 2017-04-10 RX ORDER — PROPOFOL 10 MG/ML
INJECTION, EMULSION INTRAVENOUS
Status: DISCONTINUED | OUTPATIENT
Start: 2017-04-10 | End: 2017-04-10 | Stop reason: HOSPADM

## 2017-04-10 RX ORDER — ATROPINE SULFATE 0.1 MG/ML
0.5 INJECTION INTRAVENOUS
Status: CANCELLED | OUTPATIENT
Start: 2017-04-10 | End: 2017-04-10

## 2017-04-10 RX ADMIN — SODIUM CHLORIDE: 9 INJECTION, SOLUTION INTRAVENOUS at 12:51

## 2017-04-10 RX ADMIN — Medication 10 ML: at 14:33

## 2017-04-10 RX ADMIN — PROPOFOL 100 MCG/KG/MIN: 10 INJECTION, EMULSION INTRAVENOUS at 12:57

## 2017-04-10 RX ADMIN — INSULIN LISPRO 2 UNITS: 100 INJECTION, SOLUTION INTRAVENOUS; SUBCUTANEOUS at 17:28

## 2017-04-10 RX ADMIN — PROPOFOL 50 MG: 10 INJECTION, EMULSION INTRAVENOUS at 12:57

## 2017-04-10 RX ADMIN — PANTOPRAZOLE SODIUM 40 MG: 40 INJECTION, POWDER, FOR SOLUTION INTRAVENOUS at 08:54

## 2017-04-10 RX ADMIN — Medication 10 ML: at 05:41

## 2017-04-10 NOTE — DISCHARGE SUMMARY
[facility] RESIDENCY PROGRAM   Discharge/Transfer/Off-Service Note    Date: April 10, 2017    Enoch Chavez  MRN: [id number]  YOB: 1927  Age: 80 y.o. Date of admission: 4/8/2017     Date of discharge/transfer: 4/10/2017    Primary Care Physician: Adama Webster MD     Attending physician at admission: Dr. Anni Aragon     Attending physician at discharge/transfer: Dr. Pastor Jean     Resident physician at discharge/transfer: Damian Gasca MD     Consultants during hospitalization  Dr. Mariya Larkin, Gastroenterology     Admission diagnoses   GI bleed  abdominal pain    Discharge diagnoses  Hospital Problems  Date Reviewed: 4/9/2017          Codes Class Noted POA    GI bleed ICD-10-CM: K92.2  ICD-9-CM: 578.9  4/8/2017 Yes        Type 2 diabetes mellitus with diabetic neuropathy ([facility] [250.60, 357.2] (Chronic) ICD-10-CM: E11.40  ICD-9-CM: 250.60, 357.2  8/5/2015 Yes    Overview Signed 8/5/2015  2:04 PM by Adama Webster MD     mised 1/2 monfilament touches 8/5/15             Diabetes ([address] (Chronic) ICD-10-CM: E11.9  ICD-9-CM: 250.00  3/10/2014 Yes        Hyperlipidemia (Chronic) ICD-10-CM: Z49.3  ICD-9-CM: 272.4  11/16/2010 Yes        Hypertension (Chronic) ICD-10-CM: I10  ICD-9-CM: 401.9  Unknown Yes               History of Present Illness  Enoch Chavez is a 80 y.o. male with known T2DM, HLD, HTN, diverticulosis and hx of prostate CA who presents to the ER complaining of bloody stool for 1 day. The patient reports that he had some lower abdominal pain/cramping throughout the day yesterday. He reports that when he took his first bowel movment this morning, it was very loose and had blood in it. The patient reports that he has never experienced this before, and decided to drive himself (along with his family to the hospital) to be evaluated.  He reports that he does not have any other symptoms (including abdominal pain) on presentation today (no abdominal cramping, nausea/vomitting, chest pain, shortness of breath, constipation). On further history taking, I found out that the patient has recently resumed taking an old prescription for Nparosyn for some mild shoulder pain over the past several days. The patient was prescribed this medication back in 8/2017 for an inguinal hernia. The patient also reports that he takes daily aspirin for his shoulder pain. He also reports that he took an [address] yesterday for his stomach pain and cramping (he cannot tell me for sure what is in the [address] tablets). Hospital course    GI/Rectal Bleeding - presented with bright red blood per rectum. FOBT+. Hemodynamically stable, H&H stable throughout admission. Last colonoscopy (6y ago) showing diverticulosis. CT on admission showing colonic mass at hepatic flexure. Colonoscopy performed during admission--showing internal hemorrhoids, pandiverticulosis, colon polyps. No colonic mass seen. Study was limited by limited prep. Per GI, cause of bleeding thought to be pandiverticulosis. Recommended discharge with high fiber diet, resuming normal medications. Bleeding resolved on day of discharge.  -Follow up pathology report on colonic polyps. -If recurrent bleeding, patient needs RBC scan ASAP. -No further surveillance colonoscopy recommended at patient's age. Possible Colonic Mass - noted on CT abdomen done on admission. No colonic mass demonstrated on colonoscopy on day of discharge or on colonoscopy done 6 years ago. -No further surveillance colonoscopy recommended at patient's age. Polycythemia Vera with Erythrocytosis - patient followed on outpatient basis with Dr. Maxine Ellison. Hgb wnl on day of discharge.  -Follow up as directed. Hypertension - BP very stable and well controlled throughout admission.  -Continue home BP regimen:  Enalapril, HCTZ. Diabetes Mellitus T2 - patient was treated with SSI in the hospital.  Metformin held.   Last A1c noted to be 5.8 in 1/2017.  -Resume metformin outpatient. Hyperlipidemia - last lipid panel (1/2017): Cholesterol 135, HDL 50, LDL 73, TG 60.  -Resume Zocor. Physical exam at discharge: see daily progress note. Condition at discharge: stable. Labs  Recent Labs      04/10/17   0658  04/09/17   1232  04/09/17   0513   04/08/17   1117   WBC  4.5   --   5.4   --   6.2   HGB  14.4  14.2  14.4   < >  14.6   HCT  41.3  41.2  41.1   < >  43.9   PLT  142*   --   134*   --   134*    < > = values in this interval not displayed. Recent Labs      04/10/17   0658  04/09/17   0513  04/08/17   1117   NA  141  138  135*   K  3.7  3.8  3.9   CL  104  105  100   CO2  29  23  28   BUN  7  5*  10   CREA  0.86  0.69*  0.81   GLU  93  172*  122*   CA  8.4*  8.4*  8.8   MG   --    --   1.7     Recent Labs      04/10/17   0658  04/09/17   0513  04/08/17   1117   SGOT  22  15  12*   ALT  18  13  12   AP  57  47  53   TBILI  1.1*  1.1*  0.8   TP  7.1  6.7  7.6   ALB  3.3*  2.9*  3.6   GLOB  3.8  3.8  4.0     Recent Labs      04/08/17   1117   INR  1.1   PTP  11.2*   APTT  33.6*      No results for input(s): FE, TIBC, PSAT, FERR in the last 72 hours. No results for input(s): PH, PCO2, PO2 in the last 72 hours. No results for input(s): CPK, CKMB in the last 72 hours. No lab exists for component: TROPONINI  Lab Results   Component Value Date/Time    Glucose (POC) 88 04/10/2017 07:26 AM    Glucose (POC) 84 04/09/2017 09:39 PM    Glucose (POC) 77 04/09/2017 09:23 PM    Glucose (POC) 88 04/09/2017 05:20 PM    Glucose (POC) 78 04/09/2017 04:43 PM       Diagnostic studies  CT Abdomen/Pelvis W WO Contrast on 4/8/17 which showed No evidence for acute gastrointestinal hemorrhage. Extensive colonic  diverticulosis without evidence for diverticulitis. Trace pelvic free fluid of uncertain significance. Questionable nonobstructing mass within the colonic hepatic flexure. Recommend nonemergent colonoscopy to exclude colon cancer.  Right inguinal hernia containing nondilated segment of distal small bowel. No bowel obstruction. Procedures  · Colonoscopy on 4/10/17 which showed pandiverticulosis ( likely source of bleeding), colon polyps ( benign appearing), limited prep and no colonic mass seen. Disposition:  Discharge to home with close follow up appointment. Discharge/Transfer Medications  Current Discharge Medication List      CONTINUE these medications which have NOT CHANGED    Details   acetaminophen (TYLENOL) 650 mg CR tablet Take 650 mg by mouth nightly. metFORMIN (GLUCOPHAGE) 500 mg tablet TAKE ONE-HALF TABLET BY MOUTH TWICE DAILY WITH MEALS  Qty: 90 Tab, Refills: 0      simvastatin (ZOCOR) 20 mg tablet Take 1 Tab by mouth nightly. Qty: 90 Tab, Refills: 3    Associated Diagnoses: Pure hypercholesterolemia      hydroCHLOROthiazide (HYDRODIURIL) 25 mg tablet TAKE ONE TABLET BY MOUTH ONCE DAILY (GENERIC FOR HYDRODIURIL)  Qty: 90 Tab, Refills: 3      enalapril (VASOTEC) 10 mg tablet TAKE ONE TABLET BY MOUTH ONCE DAILY  Qty: 90 Tab, Refills: 2    Associated Diagnoses: Essential hypertension with goal blood pressure less than 140/90      tamsulosin (FLOMAX) 0.4 mg capsule Take 0.4 mg by mouth daily (after dinner). finasteride (PROSCAR) 5 mg tablet Take 5 mg by mouth daily (after dinner). cholecalciferol (VITAMIN D-3) 400 unit Tab tablet Take 400 Units by mouth daily. aspirin 81 mg Tab Take 81 mg by mouth daily. Recommended follow-up tests after discharge  · CBC, CMP. Diet:  Diabetic, low sodium diet    Activity:  As tolerated     Discharge instructions to patient/family  Please seek medical attention for any new or worsening symptoms particularly fever, chest pain, shortness of breath, abdominal pain, nausea, vomiting    Follow up plans/appointments  Follow-up Information     Follow up With Details Comments Haile Esparza MD On 4/14/2017 11:30 am for hospital follow up.  [address] [facility] [place] [address] [facility]               Kya Yoo MD  Family Medicine Resident    Cc: Yaquelin Mohr MD

## 2017-04-10 NOTE — PROGRESS NOTES
Devin Suarez  12/13/1927  [id number]    Situation:  Verbal report received from: Tyrell Medel RN  Procedure: Procedure(s):  COLONOSCOPY  ENDOSCOPIC POLYPECTOMY  RESOLUTION CLIP    Background:    Preoperative diagnosis: abdominal pain  Postoperative diagnosis: pan diverticulosis, hemorrhoids    :  Dr. Jessie Hassan  Assistant(s): Endoscopy Technician-1: Devorah Esquivel  Endoscopy RN-1: Subha Nevarez RN    Specimens:   ID Type Source Tests Collected by Time Destination   1 : polyp Preservative Cecum  Darline Conti MD 4/10/2017 1316 Pathology   2 : Polyp Preservative Colon, Transverse  Darline Conti MD 4/10/2017 1317 Pathology     H. Pylori  no    Assessment:  Intra-procedure medications     Anesthesia gave intra-procedure sedation and medications, see anesthesia flow sheet yes    Intravenous fluids: NS@ KVO     Vital signs stable   yes    Abdominal assessment: round and soft   yes    Recommendation:  Discharge patient per MD order inpatient.   Return to floor  Room 524  Family or Friend   None present  Permission to share finding with family or friend yes and n/a

## 2017-04-10 NOTE — PROGRESS NOTES
4/10/2017  10:30 AM  CM met w/ pt for assessment and D/C planning. Charted address and PCP verified,  DR. Han Boateng, last seen 2-3 wks ago. Pt lives in 1 story home w/ wife Pau Nova [phone], w/ ramp in front and 4-5 steps in rear. PTA pt was independent w/ all ADL's and driving \"I drove myself here\"   Pt uses [facility] in [place] for pharmacy. Denies any previous HH and DME. Pt will call brother in law for transport home. Plan is to return home when medically stable. Care Management Interventions  PCP Verified by CM: Yes (Dr Han Boateng, pt last seen 2-3 wks ago.)  Palliative Care Consult (Criteria: CHF and RRAT>21): No  Reason for No Palliative Care Consult:  (Pt RRAT 25 however no CHF)  Mode of Transport at Discharge:  Other (see comment) (Family will transport)  Current Support Network: Lives with Spouse  Confirm Follow Up Transport: Self  Plan discussed with Pt/Family/Caregiver: Yes  Discharge Location  Discharge Placement: Home  Lisandro Wolff  Case Management

## 2017-04-10 NOTE — PERIOP NOTES
TRANSFER - IN REPORT:    Verbal report received from Pranay Nicholson RN(name) on Leola Woo  being received from 5th floor(unit) for Colonoscopy procedure. Report consisted of patients Situation, Background, Assessment and   Recommendations(SBAR). Information from the following report(s) SBAR, Intake/Output and MAR was reviewed with the receiving nurse. Opportunity for questions and clarification was provided. Assessment completed upon patients arrival to unit and care assumed.

## 2017-04-10 NOTE — PROCEDURES
[address] [facility] [address]  [address] [facility], [address] [facility]  [phone]               Colonoscopy Operative Report      Indications:    Hematochezia/melena     :  Michele Corea MD    Referring Provider: Adama Webster MD    Sedation:  MAC anesthesia Propofol    Procedure Details:  After informed consent was obtained with all risks and benefits of procedure explained and preoperative exam completed, the patient was taken to the endoscopy suite and placed in the left lateral decubitus position. Upon sequential sedation as per above, a digital rectal exam was performed  And was normal.  The Olympus videocolonoscope  was inserted in the rectum and carefully advanced to the cecum, which was identified by the ileocecal valve and appendiceal orifice. The quality of preparation was fair. The colonoscope was slowly withdrawn with careful evaluation between folds. Retroflexion in the rectum was performed and was normal..     Findings:   Rectum: Grade 1 internal hemorrhoid(s); Sigmoid:     - Diverticulosis solid stool noted  Descending Colon:     - Diverticulosis  Transverse Colon: 1  Sessile polyp(s), the largest 8 mm in size; diverticulosis  Ascending Colon: normal- stool noted. diverticulosis  Cecum: 1  Sessile polyp(s), the largest 4 mm in size; Terminal Ileum: normal    Interventions:  2 complete polypectomy were performed using cold snare  and the polyps were  retrieved  endoclip was placed for hemorrhage control. ( two). NO bleeding noted at the end of the procedure    Specimen Removed:  specimen #1, 4 mm in size, located in the cecum removed by cold biopsy and sent for pathology                                          Specimen #2 8 mm in size located in transverse colon removed cold snare    Complications: None. EBL:  None.     Impression: Pandiverticulosis ( likely source of bleeding), colon polyps ( benign appearing), limited prep and no colonic mass seen.    Recommendations:   -Await pathology.  -High fiber diet.     -Resume normal medication(s)  -Ok to discharge from a GI perspective  -Bleeding likely diverticular related  -For recurrent bleed needs RBC scan ASAP  -No surveillance colonoscopy recommended at his age    Discharge Disposition:  Home in the company of a  when able to ambulate.     Riana Gonzalez MD  4/10/2017  1:35 PM

## 2017-04-10 NOTE — PROGRESS NOTES
Patient discharged per MD order, discharge orders and prescription reviewed with patient. Patient verbalized understanding,  IV removed with tip intact. Opportunity to ask questions provided, patient discharged home via wheelchair with family.

## 2017-04-10 NOTE — DISCHARGE INSTRUCTIONS
HOME DISCHARGE INSTRUCTIONS    Ysabel Davis / [id number] : 1927    Admission date: 2017 Discharge date: 4/10/2017     Please bring this form with you to show your care provider at your follow-up appointment. Primary care provider:  Diallo Lundy MD    Discharging provider:  Gayle Serrato MD  - Family Medicine Resident  Martine Hampton MD - Attending, Family Medicine     You have been admitted to the hospital with the following diagnoses:    ACUTE DIAGNOSES:  GI bleed  abdominal pain  . . . . . . . . . . . . . . . . . . . . . . . . . . . . . . . . . . . . . . . . . . . . . . . . . . . . . . . . . . . . . . . . . . . . . . . Douglas Bending FOLLOW-UP CARE RECOMMENDATIONS:    Appointments  Follow-up Information     Follow up With Details Comments Contact Porsha Gregory MD On 2017 11:30 am for hospital follow up. [address]  [phone]             Follow-up tests needed: Repeat CBC as outpatient. Pending test results: At the time of your discharge the following test results are still pending: None. Please make sure you review these results with your outpatient follow-up provider(s). Specific symptoms to watch for: chest pain, shortness of breath, fever, chills, nausea, vomiting, diarrhea, change in mentation, falling, weakness, bleeding. DIET/what to eat:  High Fiber Diabetic Diet, low sodium. ACTIVITY:  Activity as tolerated    What to do if new or unexpected symptoms occur? If you experience any of the above symptoms (or should other concerns or questions arise after discharge) please call your primary care physician. Return to the emergency room if you cannot get hold of your doctor. · It is very important that you keep your follow-up appointment(s). · Please bring discharge papers, medication list (and/or medication bottles) to your follow-up appointments for review by your outpatient provider(s).   · Please check the list of medications and be sure it includes every medication (even non-prescription medications) that your provider wants you to take. · It is important that you take the medication exactly as they are prescribed. · Keep your medication in the bottles provided by the pharmacist and keep a list of the medication names, dosages, and times to be taken in your wallet. · Do not take other medications without consulting your doctor. · If you have any questions about your medications or other instructions, please talk to your nurse or care provider before you leave the hospital.     Information obtained by:     I understand that if any problems occur once I am at home I am to contact my physician. These instructions were explained to me and I had the opportunity to ask questions. I understand and acknowledge receipt of the instructions indicated above. [de-identified] or R.N.'s Signature                                                                  Date/Time                                                                                                                                              Patient or Representative Signature                                                          Date/Time        Diverticulosis: Care Instructions  Your Care Instructions  In diverticulosis, pouches called diverticula form in the wall of the large intestine (colon). The pouches do not cause any pain or other symptoms. Most people who have diverticulosis do not know they have it. But the pouches sometimes bleed, and if they become infected, they can cause pain and other symptoms. When this happens, it is called diverticulitis. Diverticula form when pressure pushes the wall of the colon outward at certain weak points.  A diet that is too low in fiber can cause diverticula. Follow-up care is a key part of your treatment and safety. Be sure to make and go to all appointments, and call your doctor if you are having problems. It's also a good idea to know your test results and keep a list of the medicines you take. How can you care for yourself at home? · Include fruits, leafy green vegetables, beans, and whole grains in your diet each day. These foods are high in fiber. · Take a fiber supplement, such as Citrucel or Metamucil, every day if needed. Read and follow all instructions on the label. · Drink plenty of fluids, enough so that your urine is light yellow or clear like water. If you have kidney, heart, or liver disease and have to limit fluids, talk with your doctor before you increase the amount of fluids you drink. · Get at least 30 minutes of exercise on most days of the week. Walking is a good choice. You also may want to do other activities, such as running, swimming, cycling, or playing tennis or team sports. · Cut out foods that cause gas, pain, or other symptoms. When should you call for help? Call your doctor now or seek immediate medical care if:  · You have belly pain. · You pass maroon or very bloody stools. · You have a fever. · You have nausea and vomiting. · You have unusual changes in your bowel movements or abdominal swelling. · You have burning pain when you urinate. · You have abnormal vaginal discharge. · You have shoulder pain. · You have cramping pain that does not get better when you have a bowel movement or pass gas. · You pass gas or stool from your urethra while urinating. Watch closely for changes in your health, and be sure to contact your doctor if you have any problems. Where can you learn more? Go to http://marylu-jennifer.info/. Enter S519 in the search box to learn more about \"Diverticulosis: Care Instructions. \"  Current as of: August 9, 2016  Content Version: 11.2  © 3025-8397 Healthwise, Incorporated. Care instructions adapted under license by [employer] (which disclaims liability or warranty for this information). If you have questions about a medical condition or this instruction, always ask your healthcare professional. [address] any warranty or liability for your use of this information.

## 2017-04-10 NOTE — PROGRESS NOTES
Vital signs stable. Dr. Marilyn Moreno was contacted for patient sign out for patient to return to room 524.

## 2017-04-10 NOTE — DIABETES MGMT
[facility]    Elevated Blood Glucose Note (POCT Glucose >180 mg/dL)    Recommendations/ Comments: Chart reviewed for elevated blood glucose. Codi Stovall is a 80 y.o. male with a past medical history significant for DM2 per Dr. Randi Brown MD's H&P dated 4/9/2017. Fasting glucose today: 93 mg/dL (per am lab). Required 0 units of correction in the last 24 hours. Will continue to follow. Recent Glucose Results:   Lab Results   Component Value Date/Time    GLU 93 04/10/2017 06:58 AM    GLUCPOC 88 04/10/2017 07:26 AM    GLUCPOC 84 04/09/2017 09:39 PM    GLUCPOC 77 04/09/2017 09:23 PM        Hospital (inpatient) medications:  1. Correction Scale: Lispro (Humalog) Normal Sensitivity scale to cover for glucose > 139 mg/dL before meals and for glucose >199 at bedtime      Prior to admission medications: per past medical records  Metformin 250 mg twice a day with meals     A1C:   Lab Results   Component Value Date/Time    Hemoglobin A1c 5.8 01/04/2017 08:24 AM    Hemoglobin A1c 6.0 05/04/2016 08:22 AM     Lab Results   Component Value Date/Time    Creatinine 0.86 04/10/2017 06:58 AM     Active Orders   Diet    DIET NPO        Thank you. Jose J Forde. Alexander Lemus, COMFORTN, MPH  Diabetes [address] [place] [address]  [phone]    -For most hospitalized persons with hyperglycemia in the intensive care unit (ICU), a glucose range of 140 to 180 mg/dL is recommended, provided this target can be safely achieved. *  - For general medicine and surgery patients in non-ICU settings, a premeal glucose target <140 mg/dL and a random blood glucose <180 mg/dL are recommended. *    ERIKA Oliveros, Jewels Fraga., Lucho Gates., ... & Idalmis Olson ([id number]).  AMERICAN ASSOCIATION OF CLINICAL ENDOCRINOLOGISTS AND AMERICAN COLLEGE OF ENDOCRINOLOGY-CLINICAL PRACTICE GUIDELINES FOR DEVELOPING A DIABETES MELLITUS COMPREHENSIVE CARE PLAN-2015-EXECUTIVE SUMMARY: Complete guidelines are available at https://www. aace. com/publications/guidelines. Endocrine Practice, 21(4), Z8949271.

## 2017-04-10 NOTE — PROGRESS NOTES
Occupational Therapy Note:  Order received and appreciated. Chart reviewed. Educated patient on role of OT and patient decline OT services stating \"I am fine and can do everything for myself. \"  Per pt and RN pt has been up ad екатерина in room and bathroom with no LOB. Patient declined needs for any equipment or further services at this time, nursing made aware. OT order will be completed. Thank you for the consult.   Felicia Loera, OTR/L, CBIS

## 2017-04-10 NOTE — INTERVAL H&P NOTE
H&P Update:  Hay Quiros was seen and examined. History and physical has been reviewed. The patient has been examined.  There have been no significant clinical changes since the completion of the originally dated History and Physical.    Signed By: Nandini Duggan MD     April 10, 2017 12:19 PM

## 2017-04-10 NOTE — PERIOP NOTES
Received report from Ligandal. See anesthesia record. ABD remains soft and non-tender post procedure. Pt has no complaints at this time and tolerated the procedure well.

## 2017-04-10 NOTE — H&P (VIEW-ONLY)
Gastroenterology Consult     Referring Physician: Kristel Davidson Date: 4/9/2017     Subjective:     Chief Complaint: rectal bleed    History of Present Illness: Codi Stovall is a 80 y.o. male who is seen in consultation for rectal bleed and mass on CT. Denies pain just saw small quantity BRBPR. No fever or chills. Ct show \"possible mass\" in colon. Last colon was 6 yr ago by Emilie Srivastava. Past Medical History:   Diagnosis Date    Allergic rhinitis     Benign prostatic hypertrophy     Erectile dysfunction     Erythrocytosis     Hypertension     Polyp, colon      History reviewed. No pertinent surgical history. Family History   Problem Relation Age of Onset    Cancer Mother     Cancer Brother      lung    Heart Disease Brother      Social History   Substance Use Topics    Smoking status: Former Smoker     Packs/day: 1.00     Years: 15.00    Smokeless tobacco: Never Used    Alcohol use No      No Known Allergies  Current Facility-Administered Medications   Medication Dose Route Frequency    insulin lispro (HUMALOG) injection   SubCUTAneous AC&HS    sodium chloride (NS) flush 5-10 mL  5-10 mL IntraVENous Q8H    sodium chloride (NS) flush 5-10 mL  5-10 mL IntraVENous PRN    acetaminophen (TYLENOL) tablet 650 mg  650 mg Oral Q4H PRN    glucose chewable tablet 16 g  4 Tab Oral PRN    dextrose (D50W) injection syrg 12.5-25 g  12.5-25 g IntraVENous PRN    glucagon (GLUCAGEN) injection 1 mg  1 mg IntraMUSCular PRN    pantoprazole (PROTONIX) injection 40 mg  40 mg IntraVENous Q12H    0.9% sodium chloride infusion 250 mL  250 mL IntraVENous PRN        Review of Systems:  A detailed 10 organ review of systems is obtained with pertinent positives as listed in the History of Present Illness and Past Medical History. All others are negative.     Objective:     Physical Exam:  Visit Vitals    /79 (BP 1 Location: Right arm, BP Patient Position: Sitting)    Pulse 83    Temp 97.4 °F (36.3 °C)    Resp 15    Ht 5' 9\" (1.753 m)    Wt 78.9 kg (174 lb)    SpO2 99%    BMI 25.7 kg/m2        Skin:  Extremities and face reveal no rashes. No jc erythema. No telangiectasias on the chest wall. HEENT: Sclerae anicteric. Extra-occular muscles are intact. No oral ulcers. No abnormal pigmentation of the lips. The neck is supple. Cardiovascular: Regular rate and rhythm. No murmurs, gallops, or rubs. PMI nondisplaced. Carotids without bruits. Respiratory:  Comfortable breathing with no accessory muscle use. Clear breath sounds with no wheezes, rales, or rhonchi. GI:  Abdomen nondistended, soft, and nontender. Normal active bowel sounds. No enlargement of the liver or spleen. No masses palpable. Rectal:  Deferred  Musculoskeletal:  No pitting edema of the lower legs. Extremities have good range of motion. No costovertebral tenderness. Neurological:  Gross memory appears intact. Patient is alert and oriented. Psychiatric:  Mood appears appropriate with judgement intact. Lymphatic:  No cervical or supraclavicular adenopathy.     Lab/Data Review:  CMP:   Lab Results   Component Value Date/Time     04/09/2017 05:13 AM    K 3.8 04/09/2017 05:13 AM     04/09/2017 05:13 AM    CO2 23 04/09/2017 05:13 AM    AGAP 10 04/09/2017 05:13 AM     (H) 04/09/2017 05:13 AM    BUN 5 (L) 04/09/2017 05:13 AM    CREA 0.69 (L) 04/09/2017 05:13 AM    GFRAA >60 04/09/2017 05:13 AM    GFRNA >60 04/09/2017 05:13 AM    CA 8.4 (L) 04/09/2017 05:13 AM    ALB 2.9 (L) 04/09/2017 05:13 AM    TP 6.7 04/09/2017 05:13 AM    GLOB 3.8 04/09/2017 05:13 AM    AGRAT 0.8 (L) 04/09/2017 05:13 AM    SGOT 15 04/09/2017 05:13 AM    ALT 13 04/09/2017 05:13 AM     CBC:   Lab Results   Component Value Date/Time    WBC 5.4 04/09/2017 05:13 AM    HGB 14.2 04/09/2017 12:32 PM    HCT 41.2 04/09/2017 12:32 PM     (L) 04/09/2017 05:13 AM         Assessment/Plan:     Rectal bleed  Hemorrhoids   diverticulosis     Colon in am

## 2017-04-10 NOTE — ANESTHESIA POSTPROCEDURE EVALUATION
Post-Anesthesia Evaluation and Assessment    Patient: Ligia Doshi MRN: [id number]  SSN: [id number]    YOB: 1927  Age: 80 y.o. Sex: male       Cardiovascular Function/Vital Signs  Visit Vitals    /82    Pulse 79    Temp 36.4 °C (97.5 °F)    Resp 15    Ht 5' 9\" (1.753 m)    Wt 78.9 kg (174 lb)    SpO2 100%    BMI 25.7 kg/m2       Patient is status post MAC anesthesia for Procedure(s):  COLONOSCOPY  ENDOSCOPIC POLYPECTOMY  RESOLUTION CLIP. Nausea/Vomiting: None    Postoperative hydration reviewed and adequate. Pain:  Pain Scale 1: Numeric (0 - 10) (04/10/17 1408)  Pain Intensity 1: 0 (04/10/17 1408)   Managed    Neurological Status: At baseline    Mental Status and Level of Consciousness: Arousable    Pulmonary Status:   O2 Device: Room air (04/10/17 1408)   Adequate oxygenation and airway patent    Complications related to anesthesia: None    Post-anesthesia assessment completed.  No concerns    Signed By: Osorio Rob MD     April 10, 2017

## 2017-04-10 NOTE — ANESTHESIA PREPROCEDURE EVALUATION
Anesthetic History   No history of anesthetic complications            Review of Systems / Medical History  Patient summary reviewed    Pulmonary                   Neuro/Psych   Within defined limits           Cardiovascular    Hypertension              Exercise tolerance: >4 METS     GI/Hepatic/Renal  Within defined limits              Endo/Other    Diabetes: type 2         Other Findings   Comments: Prostate ca.            Physical Exam    Airway  Mallampati: I  TM Distance: 4 - 6 cm  Neck ROM: decreased range of motion   Mouth opening: Normal     Cardiovascular    Rhythm: regular  Rate: normal         Dental      Comments: Missing multiple teeth   Pulmonary  Breath sounds clear to auscultation               Abdominal         Other Findings            Anesthetic Plan    ASA: 2  Anesthesia type: MAC            Anesthetic plan and risks discussed with: Patient

## 2017-04-10 NOTE — PROGRESS NOTES
[address] [facility] [address] [place] [address]   Office [phone]  Fax [phone]          Assessment and Plan     Telma Morton is a 80 y.o. male admitted for GI Bleed. He has spent 2 night(s) in the hospital.    24 Hour Events: No acute events overnight. GI/Rectal Bleeding - patient presented with history of bloody stool. FOBT+. Hemodynamically stable and Hgb has been stable. GI has evaluated patient. Recommended colonoscopy today. Does have history of diverticulosis (found colonoscopy in the past). - Consulting GI, appreciate their recs. Colonoscopy today. - 2 large-bore IVs have been in place.  - IV protonix, 40mg BID.  - Has been NPO since midnight for procedure. Possible Colonic Mass - noted on CT abdomen done on admission. Not demonstrated on colonoscopy 6 years ago. -Colonoscopy today. Polycythemia Vera with Erythrocytosis - patient is followed on outpatient basis with Dr. Mekhi Juárez. Drop in Hgb from GIB could be masked by this process.  -Daily CBC. -Follow up with Dr. Mekhi Juárez on discharge (no consult placed while patient admitted). Hypertension  - BP has been very stable, well controlled in the 120's to 130's. - Thus, will restart home BP meds when needed but will hold for now.      Diabetes Mellitus T2  - Last HgA1c 5.8 in 1/2017.   - Discontinued home metformin. .  - Insulin Sliding Scale normal sensitivity  - Hypoglycemia protocols ordered.     Hyperlipidemia - last lipid panel (1/2017): Cholesterol 135, HDL 50, LDL 73, TG 60.  -Holding home Zocor as patient is NPO. FEN/GI - NPO. Activity - Ambulate as tolerated  DVT prophylaxis - SCDs  GI prophylaxis - Protonix  Fall prophylaxis - Not indicated at this time. IV Access - 2 large bore IVs.  Disposition - Plan to d/c to TBD. Possible discharge following colonoscopy if normal.  Code Status - Full    Stanley Funes MD  Family Medicine Resident         Subjective / Objective     Subjective:  Symptoms:  Stable.   No shortness of breath, malaise, chest pain or weakness. Diet:  No nausea or vomiting. Activity level: Normal.    Pain:  He reports no pain. Patient has no complaints this morning. Reports no blood in his stool today following colonoscopy prep. Asking to go home. Asking to eat. Temp (24hrs), Av.5 °F (36.4 °C), Min:96.7 °F (35.9 °C), Max:97.9 °F (36.6 °C)     Objective:  General Appearance:  Comfortable, well-appearing, in no acute distress and not in pain. Vital signs: (most recent): Blood pressure 143/66, pulse 68, temperature 97.6 °F (36.4 °C), resp. rate 18, height 5' 9\" (1.753 m), weight 174 lb (78.9 kg), SpO2 100 %. Vital signs are normal.  No fever. Lungs:  Normal respiratory rate and normal effort. Breath sounds clear to auscultation. No wheezes or rhonchi. Heart: Normal rate. Regular rhythm. S1 normal and S2 normal.  No murmur. Extremities: There is no dependent edema. Neurological: Patient is alert and oriented to person, place and time. Skin:  Warm and dry. Abdomen: Abdomen is soft. Bowel sounds are normal.   There is no abdominal tenderness. Respiratory:   O2 Device: Room air     I/O:    Date 17 - 04/10/17 0659 04/10/17 0700 - 17 0659   Shift 2350-5671 4238-2141 24 Hour Total 4202-5566 6193-0442 24 Hour Total   I  N  T  A  K  E   Shift Total  (mL/kg)         O  U  T  P  U  T   Urine  (mL/kg/hr)            Urine Occurrence(s) 3 x  3 x       Stool            Stool Occurrence(s) 2 x  2 x       Shift Total  (mL/kg)         NET         Weight (kg) 78.9 78.9 78.9 78.9 78.9 78.9       CBC:  Recent Labs      04/10/17   0658  17   1232  17   0513   17   1117   WBC  4.5   --   5.4   --   6.2   HGB  14.4  14.2  14.4   < >  14.6   HCT  41.3  41.2  41.1   < >  43.9   PLT  142*   --   134*   --   134*    < > = values in this interval not displayed.        Metabolic Panel:  Recent Labs      04/10/17   0658  17   0513  17   1117   NA  141 138  135*   K  3.7  3.8  3.9   CL  104  105  100   CO2  29  23  28   BUN  7  5*  10   CREA  0.86  0.69*  0.81   GLU  93  172*  122*   CA  8.4*  8.4*  8.8   MG   --    --   1.7   ALB  3.3*  2.9*  3.6   SGOT  22  15  12*   ALT  18  13  12   INR   --    --   1.1          For Billing    Chief Complaint   Patient presents with   [facility] Problems  Date Reviewed: 4/9/2017          Codes Class Noted POA    GI bleed ICD-10-CM: K92.2  ICD-9-CM: 578.9  4/8/2017 Yes        Type 2 diabetes mellitus with diabetic neuropathy (HCC) [250.60, 357.2] (Chronic) ICD-10-CM: E11.40  ICD-9-CM: 250.60, 357.2  8/5/2015 Yes    Overview Signed 8/5/2015  2:04 PM by Ignacio Lynn MD     mised 1/2 monfilament touches 8/5/15             Diabetes ([facility] [address] (Chronic) ICD-10-CM: E11.9  ICD-9-CM: 250.00  3/10/2014 Yes        Hyperlipidemia (Chronic) ICD-10-CM: E78.5  ICD-9-CM: 272.4  11/16/2010 Yes        Hypertension (Chronic) ICD-10-CM: I10  ICD-9-CM: 401.9  Unknown Yes

## 2017-04-11 ENCOUNTER — PATIENT OUTREACH (OUTPATIENT)
Dept: FAMILY MEDICINE CLINIC | Age: 82
End: 2017-04-11

## 2017-04-11 NOTE — PROGRESS NOTES
NNTOCIP: [facility] 4/8-10/2017: GI Bleed: Colonoscopy 4/10/2017= pandiverticulosis ( likely source of bleeding), colon polyps ( benign appearing). Patient with history of: T2DM, HLD, HTN, diverticulosis and hx of prostate CA . NN spoke with patient after identifiers. Please note Functional Assessment. Medication reconciliation performed with patient. Patient denied: Gayatri Ducking, unable to take medications,pain,SOB,chest pain,unusual sensations,bloody stools,weakness. Patient stated, \" I feel great I've been outside already doing yard work. \" NN instructed patient to rest frequently today and stay hydrated as he just came out of the hospital and had procedures. Patient agreed to plan. Patient is aware of his appointments. Patient would like to execute an Advanced Directive but stated my family knows what to do. NN will have documents available to patient during PCP visit. NN reviewed RED FLAGS with patient and she verbalized understanding when to seek immediate medical attention 911. Red Flags: Fever,chills,Nausea,Vomiting,Diarrhea, unable to take medications,pain,SOB,chest pain,unusual sensations,weakness, bloody or tarry stool. NN remains available to patient and family. _____________________________________________  Hospital course: Valdez Broderick MD  Family Medicine Resident     GI/Rectal Bleeding - presented with bright red blood per rectum. FOBT+. Hemodynamically stable, H&H stable throughout admission. Last colonoscopy (6y ago) showing diverticulosis. CT on admission showing colonic mass at hepatic flexure. Colonoscopy performed during admission--showing internal hemorrhoids, pandiverticulosis, colon polyps. No colonic mass seen. Study was limited by limited prep. Per GI, cause of bleeding thought to be pandiverticulosis. Recommended discharge with high fiber diet, resuming normal medications. Bleeding resolved on day of discharge.  -Follow up pathology report on colonic polyps.   -If recurrent bleeding, patient needs RBC scan ASAP. -No further surveillance colonoscopy recommended at patient's age.     Possible Colonic Mass - noted on CT abdomen done on admission. No colonic mass demonstrated on colonoscopy on day of discharge or on colonoscopy done 6 years ago. -No further surveillance colonoscopy recommended at patient's age.     Polycythemia Vera with Erythrocytosis - patient followed on outpatient basis with Dr. Johnson Millan. Hgb wnl on day of discharge.  -Follow up as directed.     Hypertension - BP very stable and well controlled throughout admission.  -Continue home BP regimen: Enalapril, HCTZ.     Diabetes Mellitus T2 - patient was treated with SSI in the hospital. Metformin held. Last A1c noted to be 5.8 in 1/2017.  -Resume metformin outpatient.     Hyperlipidemia - last lipid panel (1/2017): Cholesterol 135, HDL 50, LDL 73, TG 60.  -Resume Zocor.     Physical exam at discharge: see daily progress note.     Condition at discharge: stable.     Labs          Recent Labs      04/10/17  0658 04/09/17  1232 04/09/17  0513   04/08/17  1117   WBC 4.5 --  5.4 --  6.2   HGB 14.4 14.2 14.4 < > 14.6   HCT 41.3 41.2 41.1 < > 43.9   * --  134* --  134*   < > = values in this interval not displayed.            Recent Labs      04/10/17  0658 04/09/17  0513 04/08/17  1117    138 135*   K 3.7 3.8 3.9    105 100   CO2 29 23 28   BUN 7 5* 10   CREA 0.86 0.69* 0.81   GLU 93 172* 122*   CA 8.4* 8.4* 8.8   MG --  --  1.7            Recent Labs      04/10/17  0658 04/09/17  0513 04/08/17  1117   SGOT 22 15 12*   ALT 18 13 12   AP 57 47 53   TBILI 1.1* 1.1* 0.8   TP 7.1 6.7 7.6   ALB 3.3* 2.9* 3.6   GLOB 3.8 3.8 4.0          Recent Labs      04/08/17  1117   INR 1.1   PTP 11.2*   APTT 33.6*   Diagnostic studies  CT Abdomen/Pelvis W WO Contrast on 4/8/17 which showed No evidence for acute gastrointestinal hemorrhage. Extensive colonic  diverticulosis without evidence for diverticulitis.  Trace pelvic free fluid of uncertain significance. Questionable nonobstructing mass within the colonic hepatic flexure. Recommend nonemergent colonoscopy to exclude colon cancer. Right inguinal hernia containing nondilated segment of distal small bowel. No bowel obstruction.     Procedures  · Colonoscopy on 4/10/17 which showed pandiverticulosis ( likely source of bleeding), colon polyps ( benign appearing), limited prep and no colonic mass seen.     Disposition: Discharge to home with close follow up appointment.     Discharge/Transfer Medications       Current Discharge Medication List            CONTINUE these medications which have NOT CHANGED     Details   acetaminophen (TYLENOL) 650 mg CR tablet Take 650 mg by mouth nightly.       metFORMIN (GLUCOPHAGE) 500 mg tablet TAKE ONE-HALF TABLET BY MOUTH TWICE DAILY WITH MEALS  Qty: 90 Tab, Refills: 0       simvastatin (ZOCOR) 20 mg tablet Take 1 Tab by mouth nightly.   Qty: 90 Tab, Refills: 3     Associated Diagnoses: Pure hypercholesterolemia       hydroCHLOROthiazide (HYDRODIURIL) 25 mg tablet TAKE ONE TABLET BY MOUTH ONCE DAILY (GENERIC FOR HYDRODIURIL)  Qty: 90 Tab, Refills: 3       enalapril (VASOTEC) 10 mg tablet TAKE ONE TABLET BY MOUTH ONCE DAILY  Qty: 90 Tab, Refills: 2     Associated Diagnoses: Essential hypertension with goal blood pressure less than 140/90       tamsulosin (FLOMAX) 0.4 mg capsule Take 0.4 mg by mouth daily (after dinner).       finasteride (PROSCAR) 5 mg tablet Take 5 mg by mouth daily (after dinner).       cholecalciferol (VITAMIN D-3) 400 unit Tab tablet Take 400 Units by mouth daily.       aspirin 81 mg Tab Take 81 mg by mouth daily.                 Recommended follow-up tests after discharge  · CBC, CMP.     Diet: Diabetic, low sodium diet     Activity: As tolerated     Discharge instructions to patient/family  Please seek medical attention for any new or worsening symptoms particularly fever, chest pain, shortness of breath, abdominal pain, nausea, vomiting     Follow up plans/appointments  Follow-up Information     Follow up With Details Comments Contact Trae Plasencia MD     On 4/14/2017 11:30 am for hospital follow up. [id number]  [phone]   Dr. Gonzalo Brown.  May 18,2017

## 2017-04-11 NOTE — PATIENT INSTRUCTIONS
Gastrointestinal Bleeding: Care Instructions  Your Care Instructions    The digestive or gastrointestinal tract goes from the mouth to the anus. It is often called the GI tract. Bleeding can happen anywhere in the GI tract. It may be caused by an ulcer, an infection, or cancer. It may also be caused by medicines such as aspirin or ibuprofen. Light bleeding may not cause any symptoms at first. But if you continue to bleed for a while, you may feel very weak or tired. Sudden, heavy bleeding means you need to see a doctor right away. This kind of bleeding can be very dangerous. But it can usually be cured or controlled. The doctor may do some tests to find the cause of your bleeding. Follow-up care is a key part of your treatment and safety. Be sure to make and go to all appointments, and call your doctor if you are having problems. It's also a good idea to know your test results and keep a list of the medicines you take. How can you care for yourself at home? · Be safe with medicines. Take your medicines exactly as prescribed. Call your doctor if you think you are having a problem with your medicine. You will get more details on the specific medicines your doctor prescribes. · Do not take aspirin or other anti-inflammatory medicines, such as naproxen (Aleve) or ibuprofen (Advil, Motrin), without talking to your doctor first. Ask your doctor if it is okay to use acetaminophen (Tylenol). · Do not drink alcohol. · The bleeding may make you lose iron. So it's important to eat foods that have a lot of iron. These include red meat, shellfish, poultry, and eggs. They also include beans, raisins, whole-grain breads, and leafy green vegetables. If you want help planning meals, you can make an appointment with a dietitian. When should you call for help? Call 911 anytime you think you may need emergency care. For example, call if:  · You have sudden, severe belly pain.   · You vomit blood or what looks like coffee grounds. · You passed out (lost consciousness). · Your stools are maroon or very bloody. Call your doctor now or seek immediate medical care if:  · You are dizzy or lightheaded, or you feel like you may faint. · Your stools are black and look like tar, or they have streaks of blood. · You have belly pain. · You vomit or have nausea. · You have trouble swallowing, or it hurts when you swallow. Watch closely for changes in your health, and be sure to contact your doctor if:  · You do not get better as expected. Where can you learn more? Go to http://marylu-jennifer.info/. Enter V750 in the search box to learn more about \"Gastrointestinal Bleeding: Care Instructions. \"  Current as of: May 27, 2016  Content Version: 11.2  © 0843-5954 [employer]. Care instructions adapted under license by [employer] (which disclaims liability or warranty for this information). If you have questions about a medical condition or this instruction, always ask your healthcare professional. [place] [address] any warranty or liability for your use of this information.

## 2017-04-14 ENCOUNTER — OFFICE VISIT (OUTPATIENT)
Dept: FAMILY MEDICINE CLINIC | Age: 82
End: 2017-04-14

## 2017-04-14 VITALS
HEIGHT: 69 IN | HEART RATE: 72 BPM | BODY MASS INDEX: 24.73 KG/M2 | SYSTOLIC BLOOD PRESSURE: 125 MMHG | OXYGEN SATURATION: 98 % | RESPIRATION RATE: 18 BRPM | TEMPERATURE: 97.3 F | DIASTOLIC BLOOD PRESSURE: 65 MMHG | WEIGHT: 167 LBS

## 2017-04-14 DIAGNOSIS — Z09 HOSPITAL DISCHARGE FOLLOW-UP: Primary | ICD-10-CM

## 2017-04-14 DIAGNOSIS — I10 ESSENTIAL HYPERTENSION: Chronic | ICD-10-CM

## 2017-04-14 DIAGNOSIS — D75.1 ERYTHROCYTOSIS: Chronic | ICD-10-CM

## 2017-04-14 DIAGNOSIS — E11.40 TYPE 2 DIABETES MELLITUS WITH DIABETIC NEUROPATHY, WITHOUT LONG-TERM CURRENT USE OF INSULIN (HCC): Chronic | ICD-10-CM

## 2017-04-14 DIAGNOSIS — K92.2 LOWER GI BLEED: ICD-10-CM

## 2017-04-14 NOTE — MR AVS SNAPSHOT
Visit Information Date & Time Provider Department Dept. Phone Encounter #  
 4/14/2017 11:30 AM Kuldeep Agrawal  [facility] [phone] [id number] Follow-up Instructions Return in about 3 months (around 7/14/2017). Your Appointments 5/12/2017  8:00 AM  
ROUTINE CARE with Kuldeep Agrawal MD  
[facility]) Appt Note: 4 mo f/u-fasting;Diabetes; 4 mo f/u-fasting;Diabetes [address] [address]  
[facility] [address] [address] Upcoming Health Maintenance Date Due DTaP/Tdap/Td series (1 - Tdap) 9/7/2005 HEMOGLOBIN A1C Q6M 7/4/2017 EYE EXAM RETINAL OR DILATED Q1 12/1/2017 FOOT EXAM Q1 1/4/2018 MICROALBUMIN Q1 1/4/2018 LIPID PANEL Q1 1/4/2018 MEDICARE YEARLY EXAM 3/21/2018 GLAUCOMA SCREENING Q2Y 12/1/2018 Allergies as of 4/14/2017  Review Complete On: 4/14/2017 By: Julian Pimentel LPN No Known Allergies Current Immunizations  Reviewed on 5/4/2016 Name Date Influenza Vaccine 10/27/2015, 9/30/2014, 9/17/2013 Influenza Vaccine (Quad) PF 10/5/2016 Influenza Vaccine Split 11/14/2012, 10/14/2011, 10/11/2010 Influenza Vaccine Whole 10/11/2010, 9/9/2009 Pneumococcal Conjugate (PCV-13) 8/5/2015 Pneumococcal Vaccine (Unspecified Type) 12/1/2010, 11/16/1997 TD Vaccine 9/6/2005 Not reviewed this visit You Were Diagnosed With   
  
 Codes Comments Hospital discharge follow-up    -  Primary ICD-10-CM: [address] ICD-9-CM: V67.59 Lower GI bleed     ICD-10-CM: K92.2 ICD-9-CM: 578.9 Type 2 diabetes mellitus with diabetic neuropathy, without long-term current use of insulin (HCC)     ICD-10-CM: E11.40 ICD-9-CM: 250.60, 357.2 Essential hypertension     ICD-10-CM: I10 
ICD-9-CM: 401.9 Erythrocytosis     ICD-10-CM: D75.1 ICD-9-CM: 544. 0 Vitals BP Pulse Temp Resp Height(growth percentile) Weight(growth percentile) 146/73 (BP 1 Location: Right arm, BP Patient Position: Sitting) 72 97.3 °F (36.3 °C) (Oral) 18 5' 9\" (1.753 m) 167 lb (75.8 kg) SpO2 BMI Smoking Status 98% 24.66 kg/m2 Former Smoker Vitals History BMI and BSA Data Body Mass Index Body Surface Area  
 24.66 kg/m 2 1.92 m 2 Preferred Pharmacy Pharmacy Name Phone [facility] [address] [facility] [address] [phone] Your Updated Medication List  
  
   
This list is accurate as of: 4/14/17 12:09 PM.  Always use your most recent med list.  
  
  
  
  
 acetaminophen 650 mg CR tablet Commonly known as:  TYLENOL Take 650 mg by mouth nightly. aspirin 81 mg tablet Take 81 mg by mouth daily. enalapril 10 mg tablet Commonly known as:  VASOTEC  
TAKE ONE TABLET BY MOUTH ONCE DAILY  
  
 finasteride 5 mg tablet Commonly known as:  PROSCAR Take 5 mg by mouth daily (after dinner). hydroCHLOROthiazide 25 mg tablet Commonly known as:  HYDRODIURIL  
TAKE ONE TABLET BY MOUTH ONCE DAILY (GENERIC FOR HYDRODIURIL) metFORMIN 500 mg tablet Commonly known as:  GLUCOPHAGE  
TAKE ONE-HALF TABLET BY MOUTH TWICE DAILY WITH MEALS  
  
 simvastatin 20 mg tablet Commonly known as:  ZOCOR Take 1 Tab by mouth nightly. tamsulosin 0.4 mg capsule Commonly known as:  FLOMAX Take 0.4 mg by mouth daily (after dinner). VITAMIN D3 400 unit Tab tablet Generic drug:  cholecalciferol Take 400 Units by mouth daily. We Performed the Following CBC WITH AUTOMATED DIFF [32583 CPT(R)] METABOLIC PANEL, BASIC [71667 CPT(R)] Follow-up Instructions Return in about 3 months (around 7/14/2017). Patient Instructions HOLD all Arthritis medication. Use Tylenol 650 mg every 8 hours for arthritis pain. Stop metformin unless blood sugars run over 130. Introducing [facility]! Jarrett Long introduces [employer] patient portal. Now you can access parts of your medical record, email your doctor's office, and request medication refills online. 1. In your internet browser, go to [email or web address] 2. Click on the First Time User? Click Here link in the Sign In box. You will see the New Member Sign Up page. 3. Enter your [employer] Access Code exactly as it appears below. You will not need to use this code after youve completed the sign-up process. If you do not sign up before the expiration date, you must request a new code. · [employer] Access Code: [email or web address] Expires: 6/18/2017  1:17 PM 
 
4. Enter the last four digits of your Social Security Number (xxxx) and Date of Birth (mm/dd/yyyy) as indicated and click Submit. You will be taken to the next sign-up page. 5. Create a [employer] ID. This will be your [employer] login ID and cannot be changed, so think of one that is secure and easy to remember. 6. Create a [employer] password. You can change your password at any time. 7. Enter your Password Reset Question and Answer. This can be used at a later time if you forget your password. 8. Enter your e-mail address. You will receive e-mail notification when new information is available in [address] 9. Click Sign Up. You can now view and download portions of your medical record. 10. Click the Download Summary menu link to download a portable copy of your medical information. If you have questions, please visit the Frequently Asked Questions section of the [employer] website. Remember, [employer] is NOT to be used for urgent needs. For medical emergencies, dial 911. Now available from your iPhone and Android! Please provide this summary of care documentation to your next provider. Your primary care clinician is listed as [address]. If you have any questions after today's visit, please call [phone].

## 2017-04-14 NOTE — PROGRESS NOTES
Transition of Care Visit    Patient: Italia Perez MRN: [id number]  SSN: [id number]    YOB: 1927  Age: 80 y.o. Sex: male      [facility]  Dates of admission:4/8/-4/10/17  Discharge diagnoses:GI bleed from diverticuli  State of health at discharge: improved, cut his grass this AM with a push mower. Surgical or invasive procedures done:colonoscopy    Amount and/or Complexity of Data Reviewed:   Clinical lab tests: Reviewed or ordered   Tests in the radiology section: reviewed or ordered  Discussion of test results with the patient-yes  Obtain previous medical records or obtain history from someone other than the patient: No  Obtain history from someone other than the patient: no  Review and address past medical records: yes  Discuss the patient with another provider: no  Independant visualization of image, tracing, or specimen: no    Risk of Significant Complications, Morbidity, and/or Mortality:   Presenting problems: High   Diagnostic procedures: Moderate   Management options: Moderate     Transition of Care time spent:   Total time providing care and documentation: 40-60 minutes   Progress at discharge:   Stable on Discharge      Progress Note    Patient: Italia Perez MRN: [id number]  SSN: [id number]    YOB: 1927  Age: 80 y.o. Sex: male        Chief Complaint   Patient presents with   [facility] Follow Up     GI Bleed, Needs CBC/BMP checked         Subjective:     Encounter Diagnoses   Name Primary? [facility] discharge follow-up: He is feeling well enough to cut his grass with his push more this morning. He has no weakness and no further GI bleeding noted. He did not require a transfusion. He said he passed about one half a cup of blood. Yes    Lower GI bleed: On colonoscopy it was felt to be a diverticular bleed.            Type 2 diabetes mellitus with diabetic neuropathy, without long-term current use of insulin ([facility] [address]): Sugars have been running less than 100 on 250 mg of metformin twice daily. I asked him to hold his metformin unlesssugars cale back up to 130. He is so active he may no longer need metformin. Body mass index is 24.66 kg/(m^2).  Essential hypertension:  BP Readings from Last 3 Encounters:   04/14/17 146/73   04/10/17 147/82   03/20/17 120/48     The patient reports:  taking medications as instructed, no medication side effects noted, no TIA's, no chest pain on exertion, no dyspnea on exertion, no swelling of ankles. Lab Results   Component Value Date/Time    Sodium 141 04/10/2017 06:58 AM    Potassium 3.7 04/10/2017 06:58 AM    Chloride 104 04/10/2017 06:58 AM    CO2 29 04/10/2017 06:58 AM    Anion gap 8 04/10/2017 06:58 AM    Glucose 93 04/10/2017 06:58 AM    BUN 7 04/10/2017 06:58 AM    Creatinine 0.86 04/10/2017 06:58 AM    BUN/Creatinine ratio 8 04/10/2017 06:58 AM    GFR est AA >60 04/10/2017 06:58 AM    GFR est non-AA >60 04/10/2017 06:58 AM    Calcium 8.4 04/10/2017 06:58 AM    Bilirubin, total 1.1 04/10/2017 06:58 AM    AST (SGOT) 22 04/10/2017 06:58 AM    Alk. phosphatase 57 04/10/2017 06:58 AM    Protein, total 7.1 04/10/2017 06:58 AM    Albumin 3.3 04/10/2017 06:58 AM    Globulin 3.8 04/10/2017 06:58 AM    A-G Ratio 0.9 04/10/2017 06:58 AM    ALT (SGPT) 18 04/10/2017 06:58 AM     Our goal is to normalize the blood pressure to decrease the risks of strokes and heart attacks. The patient is in agreement with the plan.  Erythrocytosis: Sees Dr. Dom De Jseus and past medical information:    Current Medications after this visit[de-identified]   Current Outpatient Prescriptions   Medication Sig    acetaminophen (TYLENOL) 650 mg CR tablet Take 650 mg by mouth nightly.  metFORMIN (GLUCOPHAGE) 500 mg tablet TAKE ONE-HALF TABLET BY MOUTH TWICE DAILY WITH MEALS    simvastatin (ZOCOR) 20 mg tablet Take 1 Tab by mouth nightly.     hydroCHLOROthiazide (HYDRODIURIL) 25 mg tablet TAKE ONE TABLET BY MOUTH ONCE DAILY (GENERIC FOR HYDRODIURIL)    enalapril (VASOTEC) 10 mg tablet TAKE ONE TABLET BY MOUTH ONCE DAILY    tamsulosin (FLOMAX) 0.4 mg capsule Take 0.4 mg by mouth daily (after dinner).  finasteride (PROSCAR) 5 mg tablet Take 5 mg by mouth daily (after dinner).  cholecalciferol (VITAMIN D-3) 400 unit Tab tablet Take 400 Units by mouth daily.  aspirin 81 mg Tab Take 81 mg by mouth daily. No current facility-administered medications for this visit. Patient Active Problem List    Diagnosis Date Noted    Type 2 diabetes mellitus with diabetic neuropathy ([facility] [250.60, 357.2] 08/05/2015     Priority: 1 - One    Diabetes ([facility] 03/10/2014     Priority: 1 - One    Colon polyp 11/16/2010     Priority: 1 - One    Hyperlipidemia 11/16/2010     Priority: 1 - One    Hypertension      Priority: 1 - One    Erythrocytosis      Priority: 1 - One    Erectile dysfunction      Priority: 1 - One    GI bleed 04/08/2017    Prostate cancer ([facility] 01/27/2011    Hyperkeratosis 11/16/2010       Past Medical History:   Diagnosis Date    Allergic rhinitis     Benign prostatic hypertrophy     Erectile dysfunction     Erythrocytosis     Hypertension     Polyp, colon        No Known Allergies    Past Surgical History:   Procedure Laterality Date    COLONOSCOPY N/A 4/10/2017    COLONOSCOPY performed by Dima Interiano MD at [address]  4/10/2017            Social History     Social History    Marital status:      Spouse name: N/A    Number of children: N/A    Years of education: N/A     Social History Main Topics    Smoking status: Former Smoker     Packs/day: 1.00     Years: 15.00    Smokeless tobacco: Never Used    Alcohol use No    Drug use: No    Sexual activity: Not Asked     Other Topics Concern    None     Social History Narrative       Review of Systems   Constitutional: Negative. Negative for fever, chills, weight loss, malaise/fatigue and diaphoresis.    HENT: Negative. Negative for hearing loss, ear pain, nosebleeds, congestion, sore throat, neck pain, tinnitus and ear discharge. Eyes: Negative. Negative for blurred vision, double vision, photophobia, pain, discharge and redness. Respiratory: Negative. Negative for cough, hemoptysis, sputum production, shortness of breath, or wheezing. Cardiovascular: Negative. Negative for chest pain, palpitations. Gastrointestinal: Negative. Negative for heartburn, nausea, vomiting, abdominal pain, diarrhea, constipation, blood in stool and melena. Genitourinary: He has urinary hesitancy. He has a history of prostate cancer and sees Dr. Noman Chambers. Skin: Negative. Negative for rash. Neurological: Negative. Negative for dizziness, tingling, tremors, sensory change, speech change, focal weakness, seizures weakness and headaches. Endo/Heme/Allergies: Negative. Negative for environmental allergies and polydipsia. Does not bruise/bleed easily. Psychiatric/Behavioral: Negative. Negative for depression, suicidal ideas, hallucinations, memory loss. Objective:     Vitals:    04/14/17 1141   BP: 146/73   Pulse: 72   Resp: 18   Temp: 97.3 °F (36.3 °C)   TempSrc: Oral   SpO2: 98%   Weight: 167 lb (75.8 kg)   Height: 5' 9\" (1.753 m)      Body mass index is 24.66 kg/(m^2). Physical Exam   Nursing note reviewed. Constitutional: Oriented to person, place, and time. Appears well-developed and well-nourished. No distress. HENT: Oropharynx normal, no adenopathy. Head: Normocephalic and atraumatic. Eyes: Conjunctivae are normal. Pupils are equal, round. No scleral icterus. Neck: Normal range of motion. Neck supple. No JVD present. No tracheal deviation present. No thyromegaly present. No carotid bruit. Cardiovascular: Normal rate, regular rhythm and normal heart sounds. Exam reveals no gallop and no friction rub. No murmur heard. Pulmonary/Chest: Effort normal and breath sounds normal. Has no wheezes.  Has no rales. Abdominal: Soft. Bowel sounds are normal. No distension. There is no tenderness. There is no rebound and no guarding. Musculoskeletal: Exhibits no edema. Neurological: The patient is alert and oriented to person, place, and time. No tremor. Skin: Skin is warm and dry. No rash noted. Not diaphoretic. Psychiatric:  Has a normal mood and affect. Behavior is normal. Judgment and thought content normal      Health Maintenance Due   Topic Date Due    DTaP/Tdap/Td series (1 - Tdap) 09/07/2005       Assessment and orders:       ICD-10-CM ICD-9-CM    1. Hospital discharge follow-up   Z09 V67.59    2. Lower GI bleed -stable will repeat CBC. He will stop all arthritis medicines except Tylenol 650 mg every 8 hours. Please note he had been taking Yvette-Laurel and Naprosyn before going to the hospital.   K92.2 578.9 CBC WITH AUTOMATED DIFF      METABOLIC PANEL, BASIC   3. Type 2 diabetes mellitus with diabetic neuropathy, without long-term current use of insulin (HCC)-hold metformin unless blood sugars run 130. E12.56 543.44 METABOLIC PANEL, BASIC     357.2    4. Essential hypertension-controlled    C16 609.9 METABOLIC PANEL, BASIC   5. Dvypvasmugljfz-gogpca-iq with Dr. Robert Maxwell  D75.1 289.0          Plan of care:  Discussed diagnoses in detail with patient. Medication risks/benefits/side effects discussed with patient. All of the patient's questions were addressed. The patient understands and agrees with our plan of care. The patient knows to call back if they are unsure of or forget any changes we discussed today or if the symptoms change. The patient received an After-Visit Summary which contains VS, orders, medication list and allergy list. This can be used as a \"mini-medical record\" should they have to seek medical care while out of town. Follow-up Disposition:  Return in about 3 months (around 7/14/2017).     Future Appointments  Date Time Provider Fatou Lopez   5/12/2017 8:00 AM Abhijit Jones MD [facility] SCHED       Signed By: Abhijit Jones MD     April 14, 2017

## 2017-04-14 NOTE — PROGRESS NOTES
Reviewed record in preparation for visit and have necessary documentation  Pt did bring medication to office visit for review  opportunity was given for questions  Goals that were addressed and/or need to be completed during or after this appointment include   Health Maintenance Due   Topic Date Due    DTaP/Tdap/Td series (1 - Tdap) 09/07/2005

## 2017-04-14 NOTE — PATIENT INSTRUCTIONS
HOLD all Arthritis medication. Use Tylenol 650 mg every 8 hours for arthritis pain. Stop metformin unless blood sugars run over 130.

## 2017-04-15 LAB
BASOPHILS # BLD AUTO: 0 X10E3/UL (ref 0–0.2)
BASOPHILS NFR BLD AUTO: 0 %
BUN SERPL-MCNC: 9 MG/DL (ref 8–27)
BUN/CREAT SERPL: 11 (ref 10–24)
CALCIUM SERPL-MCNC: 9.2 MG/DL (ref 8.6–10.2)
CHLORIDE SERPL-SCNC: 94 MMOL/L (ref 96–106)
CO2 SERPL-SCNC: 26 MMOL/L (ref 18–29)
CREAT SERPL-MCNC: 0.83 MG/DL (ref 0.76–1.27)
EOSINOPHIL # BLD AUTO: 0.1 X10E3/UL (ref 0–0.4)
EOSINOPHIL NFR BLD AUTO: 2 %
ERYTHROCYTE [DISTWIDTH] IN BLOOD BY AUTOMATED COUNT: 13.7 % (ref 12.3–15.4)
GLUCOSE SERPL-MCNC: 119 MG/DL (ref 65–99)
HCT VFR BLD AUTO: 40.8 % (ref 37.5–51)
HGB BLD-MCNC: 13.6 G/DL (ref 12.6–17.7)
IMM GRANULOCYTES # BLD: 0 X10E3/UL (ref 0–0.1)
IMM GRANULOCYTES NFR BLD: 0 %
LYMPHOCYTES # BLD AUTO: 1.1 X10E3/UL (ref 0.7–3.1)
LYMPHOCYTES NFR BLD AUTO: 21 %
MCH RBC QN AUTO: 31.8 PG (ref 26.6–33)
MCHC RBC AUTO-ENTMCNC: 33.3 G/DL (ref 31.5–35.7)
MCV RBC AUTO: 95 FL (ref 79–97)
MONOCYTES # BLD AUTO: 0.4 X10E3/UL (ref 0.1–0.9)
MONOCYTES NFR BLD AUTO: 8 %
NEUTROPHILS # BLD AUTO: 3.5 X10E3/UL (ref 1.4–7)
NEUTROPHILS NFR BLD AUTO: 69 %
PLATELET # BLD AUTO: 176 X10E3/UL (ref 150–379)
POTASSIUM SERPL-SCNC: 4.3 MMOL/L (ref 3.5–5.2)
RBC # BLD AUTO: 4.28 X10E6/UL (ref 4.14–5.8)
SODIUM SERPL-SCNC: 135 MMOL/L (ref 134–144)
WBC # BLD AUTO: 5.1 X10E3/UL (ref 3.4–10.8)

## 2017-04-18 DIAGNOSIS — E78.00 PURE HYPERCHOLESTEROLEMIA: Chronic | ICD-10-CM

## 2017-04-21 RX ORDER — SIMVASTATIN 20 MG/1
20 TABLET, FILM COATED ORAL
Qty: 90 TAB | Refills: 3 | Status: SHIPPED | OUTPATIENT
Start: 2017-04-21 | End: 2017-10-17 | Stop reason: SDUPTHER

## 2017-08-03 DIAGNOSIS — I10 ESSENTIAL HYPERTENSION WITH GOAL BLOOD PRESSURE LESS THAN 140/90: Chronic | ICD-10-CM

## 2017-08-03 RX ORDER — ENALAPRIL MALEATE 10 MG/1
TABLET ORAL
Qty: 90 TAB | Refills: 0 | Status: SHIPPED | OUTPATIENT
Start: 2017-08-03 | End: 2017-11-03 | Stop reason: SDUPTHER

## 2017-08-03 NOTE — TELEPHONE ENCOUNTER
LOV and labs on 4/14/17. Patient has an appointment to see Dr. Edie Wahl in 4 days. Please advise, thank you.

## 2017-08-07 ENCOUNTER — OFFICE VISIT (OUTPATIENT)
Dept: FAMILY MEDICINE CLINIC | Age: 82
End: 2017-08-07

## 2017-08-07 VITALS
RESPIRATION RATE: 16 BRPM | WEIGHT: 168.4 LBS | TEMPERATURE: 97.6 F | DIASTOLIC BLOOD PRESSURE: 68 MMHG | SYSTOLIC BLOOD PRESSURE: 112 MMHG | BODY MASS INDEX: 24.94 KG/M2 | HEART RATE: 72 BPM | HEIGHT: 69 IN | OXYGEN SATURATION: 99 %

## 2017-08-07 DIAGNOSIS — E78.00 PURE HYPERCHOLESTEROLEMIA: Chronic | ICD-10-CM

## 2017-08-07 DIAGNOSIS — E11.9 TYPE 2 DIABETES MELLITUS WITHOUT COMPLICATION, WITHOUT LONG-TERM CURRENT USE OF INSULIN (HCC): Chronic | ICD-10-CM

## 2017-08-07 DIAGNOSIS — E11.40 TYPE 2 DIABETES MELLITUS WITH DIABETIC NEUROPATHY, WITHOUT LONG-TERM CURRENT USE OF INSULIN (HCC): Primary | Chronic | ICD-10-CM

## 2017-08-07 DIAGNOSIS — K92.2 LOWER GI BLEED: ICD-10-CM

## 2017-08-07 NOTE — PROGRESS NOTES
Chief Complaint   Patient presents with    Diabetes    Cholesterol Problem     Fasting     Body mass index is 24.87 kg/(m^2). Reviewed record in preparation for visit and have necessary documentation  Pt did not bring medication to office visit for review  Information was given to pt on Advanced Directives, Living Will  Information was given on Shingles Vaccine  Opportunity was given for questions  Goals that were addressed and/or need to be completed after this appointment include:     Health Maintenance Due   Topic Date Due    DTaP/Tdap/Td series (1 - Tdap) 09/07/2005    HEMOGLOBIN A1C Q6M  07/04/2017    INFLUENZA AGE 9 TO ADULT  08/01/2017     - check for functional glucose monitor and record keeping system: Yes, checks BS twice a week  Pt was given BS record log to document home readings and return to office for review  Diabetic Bundle:  LDL-73  A1C- 5.8  BP- 112/68  Smoking? No  Anticoagulation medication? Yes  Eye exam dilated?  Good  Foot exam? Good

## 2017-08-07 NOTE — PROGRESS NOTES
Progress Note    Patient: Ronn Jerez MRN: [id number]  SSN: [id number]    YOB: 1927  Age: 80 y.o. Sex: male        Chief Complaint   Patient presents with    Diabetes    Cholesterol Problem     Fasting         Subjective:     Encounter Diagnoses   Name Primary?  Type 2 diabetes mellitus with diabetic neuropathy, without long-term current use of insulin ([facility] [address]): This patient is managed under a comprehensive plan of care for Diabetes. Overall the patient feels well with good energy level. Pertinent Labs:   Lab Results   Component Value Date/Time    Hemoglobin A1c 5.8 01/04/2017 08:24 AM    Hemoglobin A1c 6.0 05/04/2016 08:22 AM    Hemoglobin A1c 6.0 12/30/2015 08:21 AM      Body mass index is 24.87 kg/(m^2). Lab Results   Component Value Date/Time    LDL, calculated 73 01/04/2017 08:24 AM         Lab Results   Component Value Date/Time    Sodium 135 04/14/2017 03:12 PM    Potassium 4.3 04/14/2017 03:12 PM    Chloride 94 04/14/2017 03:12 PM    CO2 26 04/14/2017 03:12 PM    Anion gap 8 04/10/2017 06:58 AM    Glucose 119 04/14/2017 03:12 PM    BUN 9 04/14/2017 03:12 PM    Creatinine 0.83 04/14/2017 03:12 PM    BUN/Creatinine ratio 11 04/14/2017 03:12 PM    GFR est AA 90 04/14/2017 03:12 PM    GFR est non-AA 78 04/14/2017 03:12 PM    Calcium 9.2 04/14/2017 03:12 PM    AST (SGOT) 22 04/10/2017 06:58 AM    Alk. phosphatase 57 04/10/2017 06:58 AM    Protein, total 7.1 04/10/2017 06:58 AM    Albumin 3.3 04/10/2017 06:58 AM    Globulin 3.8 04/10/2017 06:58 AM    A-G Ratio 0.9 04/10/2017 06:58 AM    ALT (SGPT) 18 04/10/2017 06:58 AM     Lab Results   Component Value Date/Time    Microalbumin/Creat ratio (mg/g creat) 5 07/15/2010 08:52 AM    Microalb/Creat ratio (ug/mg creat.) <4.1 01/04/2017 08:24 AM    Microalbumin,urine random 0.77 07/15/2010 08:52 AM      Frequency of home glucose testing:daily   Blood Sugar range at home:    Last eye exam: In past 12 months.    Last foot exam: This year.   Polyuria, polyphagia or polydipsia: No   Retinopathy: No   Neuropathy SX: No    Low blood sugar symptoms: No   Dietary compliance: Good   Medication compliance:Good   On ASA: Yes   Depression: No   CKD:no     Wt Readings from Last 3 Encounters:   08/07/17 168 lb 6.4 oz (76.4 kg)   04/14/17 167 lb (75.8 kg)   04/08/17 174 lb (78.9 kg)        History   Smoking Status    Former Smoker    Packs/day: 1.00    Years: 15.00   Smokeless Tobacco    Never Used         All the patient's questions regarding medications, diet and exercise were answered. Goal of A1C of less than 7.5% is our goal.   Our overall goal is to reduce or eliminate the long term consequences of poorly controlled diabetes. Yes         Pure hypercholesterolemia:  Cardiovascular risks for him are: LDL goal is under 80  diabetic. Currently he takes Zocor (simvastatin)  Lab Results   Component Value Date/Time    Cholesterol, total 135 01/04/2017 08:24 AM    HDL Cholesterol 50 01/04/2017 08:24 AM    LDL, calculated 73 01/04/2017 08:24 AM    Triglyceride 60 01/04/2017 08:24 AM    CHOL/HDL Ratio 3.1 07/15/2010 08:52 AM     Lab Results   Component Value Date/Time    ALT (SGPT) 18 04/10/2017 06:58 AM    AST (SGOT) 22 04/10/2017 06:58 AM    Alk. phosphatase 57 04/10/2017 06:58 AM    Bilirubin, total 1.1 04/10/2017 06:58 AM      Myalgias: No   Fatigue: No   Other side effects: no  Wt Readings from Last 3 Encounters:   08/07/17 168 lb 6.4 oz (76.4 kg)   04/14/17 167 lb (75.8 kg)   04/08/17 174 lb (78.9 kg)     The patient is aware of our goal to reduce or eliminate the long term problems (such as strokes and heart attacks) related to poorly controlled hyperlipidemia.        Lower GI bleed              Current and past medical information:    Current Medications after this visit[de-identified]   Current Outpatient Prescriptions   Medication Sig    enalapril (VASOTEC) 10 mg tablet TAKE ONE TABLET BY MOUTH ONCE DAILY    simvastatin (ZOCOR) 20 mg tablet Take 1 Tab by mouth nightly.  hydroCHLOROthiazide (HYDRODIURIL) 25 mg tablet TAKE ONE TABLET BY MOUTH ONCE DAILY (GENERIC FOR HYDRODIURIL)    tamsulosin (FLOMAX) 0.4 mg capsule Take 0.4 mg by mouth daily (after dinner).  finasteride (PROSCAR) 5 mg tablet Take 5 mg by mouth daily (after dinner).  cholecalciferol (VITAMIN D-3) 400 unit Tab tablet Take 400 Units by mouth daily.  aspirin 81 mg Tab Take 81 mg by mouth daily.  acetaminophen (TYLENOL) 650 mg CR tablet Take 650 mg by mouth nightly.  metFORMIN (GLUCOPHAGE) 500 mg tablet TAKE ONE-HALF TABLET BY MOUTH TWICE DAILY WITH MEALS     No current facility-administered medications for this visit.         Patient Active Problem List    Diagnosis Date Noted    Type 2 diabetes mellitus with diabetic neuropathy ([facility] [address] [250.60, 357.2] 08/05/2015     Priority: 1 - One    [facility] 03/10/2014     Priority: 1 - One    Colon polyp 11/16/2010     Priority: 1 - One    Hyperlipidemia 11/16/2010     Priority: 1 - One    Hypertension      Priority: 1 - One    Erythrocytosis      Priority: 1 - One    Erectile dysfunction      Priority: 1 - One    Lower GI bleed 08/07/2017    Prostate cancer [facility] 01/27/2011    Hyperkeratosis 11/16/2010       Past Medical History:   Diagnosis Date    Allergic rhinitis     Benign prostatic hypertrophy     Erectile dysfunction     Erythrocytosis     Hypertension     Polyp, colon        No Known Allergies    Past Surgical History:   Procedure Laterality Date    COLONOSCOPY N/A 4/10/2017    COLONOSCOPY performed by Reema Dia MD at [address]  4/10/2017            Social History     Social History    Marital status:      Spouse name: N/A    Number of children: N/A    Years of education: N/A     Social History Main Topics    Smoking status: Former Smoker     Packs/day: 1.00     Years: 15.00    Smokeless tobacco: Never Used    Alcohol use No    Drug use: No    Sexual activity: Not Asked     Other Topics Concern    None     Social History Narrative       ROS   Constitutional: Negative. Negative for fever, chills, weight loss, malaise/fatigue and diaphoresis. Says some BS<100. HENT: Negative. Negative for hearing loss, ear pain, nosebleeds, congestion, sore throat, neck pain, tinnitus and ear discharge. Eyes: Negative. Negative for blurred vision, double vision, photophobia, pain, discharge and redness. Respiratory: Negative. Negative for cough, hemoptysis, sputum production, shortness of breath, wheezing and stridor. Cardiovascular: Negative. Negative for chest pain, palpitations, orthopnea, claudication, leg swelling and PND. Gastrointestinal: Negative. Negative for heartburn, nausea, vomiting, abdominal pain, diarrhea, constipation, blood in stool and melena. Genitourinary: Negative. Negative for dysuria, urgency, frequency, hematuria and flank pain. Musculoskeletal: Negative. Negative for myalgias, back pain, joint pain and falls. Skin: Negative. Negative for rash. Neurological: Negative. Negative for dizziness, tingling, tremors, sensory change, speech change, focal weakness, seizures, loss of consciousness, weakness and headaches. Endo/Heme/Allergies: Negative. Negative for environmental allergies and polydipsia. Does not bruise/bleed easily. Psychiatric/Behavioral: Negative. Objective:     Vitals:    08/07/17 0759   BP: 112/68   Pulse: 72   Resp: 16   Temp: 97.6 °F (36.4 °C)   TempSrc: Oral   SpO2: 99%   Weight: 168 lb 6.4 oz (76.4 kg)   Height: 5' 9\" (1.753 m)      Body mass index is 24.87 kg/(m^2). Physical Exam.  Nursing note reviewed. Constitutional: She is oriented to person, place, and time. She appears well-developed and well-nourished. No distress. HENT:   Head: Normocephalic and atraumatic. Eyes: Conjunctivae are normal. Pupils are equal, round. No scleral icterus. Neck: Normal range of motion. Neck supple.  No JVD present. No tracheal deviation present. No thyromegaly present. No carotid bruit. Cardiovascular: Normal rate, regular rhythm and normal heart sounds. Exam reveals no gallop and no friction rub. No murmur heard. Pulmonary/Chest: Effort normal and breath sounds normal. Has no wheezes. Has no rales. Abdominal: Soft. Bowel sounds are normal. No distension. There is no tenderness. There is no rebound and no guarding. Musculoskeletal: Exhibits no edema. Neurological: The patient is alert and oriented to person, place, and time. Skin: Skin is warm and dry. No rash noted. Not diaphoretic. Psychiatric:  Has a normal mood and affect. Behavior is normal. Judgment and thought content normal.         Allergies: No Known Allergies  Latex allergy: no    Current Outpatient Prescriptions   Medication Sig    enalapril (VASOTEC) 10 mg tablet TAKE ONE TABLET BY MOUTH ONCE DAILY    simvastatin (ZOCOR) 20 mg tablet Take 1 Tab by mouth nightly.  hydroCHLOROthiazide (HYDRODIURIL) 25 mg tablet TAKE ONE TABLET BY MOUTH ONCE DAILY (GENERIC FOR HYDRODIURIL)    tamsulosin (FLOMAX) 0.4 mg capsule Take 0.4 mg by mouth daily (after dinner).  finasteride (PROSCAR) 5 mg tablet Take 5 mg by mouth daily (after dinner).  cholecalciferol (VITAMIN D-3) 400 unit Tab tablet Take 400 Units by mouth daily.  aspirin 81 mg Tab Take 81 mg by mouth daily.  acetaminophen (TYLENOL) 650 mg CR tablet Take 650 mg by mouth nightly.  metFORMIN (GLUCOPHAGE) 500 mg tablet TAKE ONE-HALF TABLET BY MOUTH TWICE DAILY WITH MEALS     No current facility-administered medications for this visit.       Patient Active Problem List   Diagnosis Code    Hypertension I10    Erythrocytosis D75.1    Erectile dysfunction N52.9    Colon polyp K63.5    Hyperkeratosis L85.9    Hyperlipidemia E78.5    Prostate cancer ([facility] [address] C61    Diabetes ([facility] [address] E11.9    Type 2 diabetes mellitus with diabetic neuropathy ([facility] [address] [250.60, 357.2] E11.40    Lower GI bleed K92.2     Past Surgical History:   Procedure Laterality Date    COLONOSCOPY N/A 4/10/2017    COLONOSCOPY performed by Clara Arvizu MD at [address] [facility]  4/10/2017          Social History   Substance Use Topics    Smoking status: Former Smoker     Packs/day: 1.00     Years: 15.00    Smokeless tobacco: Never Used    Alcohol use No     Family History   Problem Relation Age of Onset    Cancer Mother     Cancer Brother      lung    Heart Disease Brother                  Health Maintenance Due   Topic Date Due    HEMOGLOBIN A1C Q6M  07/04/2017    INFLUENZA AGE 9 TO ADULT - discussed. 08/01/2017         Assessment and orders:       ICD-10-CM ICD-9-CM    1. Type 2 diabetes mellitus with diabetic neuropathy, without long-term current use of insulin ([facility] [address]- doing well, sugars may be to low by hx. No SX. E11.40 250.60 HEMOGLOBIN A1C WITH EAG     357.2 LIPID PANEL      METABOLIC PANEL, COMPREHENSIVE      MICROALBUMIN, UR, RAND W/ MICROALBUMIN/CREA RATIO   2. Type 2 diabetes mellitus without complication, without long-term current use of insulin (HCC)   E11.9 250.00    3. Pure hypercholesterolemia- recheck   E78.00 272.0 LIPID PANEL   4. Lower GI bleed-no sx of recurrence off NSAID's   K92.2 578.9 CBC WITH AUTOMATED DIFF         Plan of care:  Discussed diagnoses in detail with patient. Medication risks/benefits/side effects discussed with patient. All of the patient's questions were addressed. The patient understands and agrees with our plan of care. The patient knows to call back if they are unsure of or forget any changes we discussed today or if the symptoms change. The patient received an After-Visit Summary which contains VS, orders, medication list and allergy list. This can be used as a \"mini-medical record\" should they have to seek medical care while out of town.     Patient Care Team:  Jaye Corona MD as PCP - General  Marvin Shah RN as Ambulatory Care Navigator    Follow-up Disposition:  Return in about 4 months (around 12/7/2017) for fasting.     Future Appointments  Date Time Provider Fatou Lopez   12/8/2017 8:00 AM Jaye Corona MD [facility] NIMESH SCHED       Signed By: Jaye Corona MD     August 7, 2017

## 2017-08-07 NOTE — MR AVS SNAPSHOT
Visit Information Date & Time Provider Department Dept. Phone Encounter #  
 8/7/2017  8:00 AM Claudy Rivers MD [address] [place] [address] [phone] [id number] Follow-up Instructions Return in about 4 months (around 12/7/2017) for fasting. Upcoming Health Maintenance Date Due HEMOGLOBIN A1C Q6M 7/4/2017 INFLUENZA AGE 9 TO ADULT 8/1/2017 DTaP/Tdap/Td series (1 - Tdap) 8/7/2017* EYE EXAM RETINAL OR DILATED Q1 12/1/2017 FOOT EXAM Q1 1/4/2018 MICROALBUMIN Q1 1/4/2018 LIPID PANEL Q1 1/4/2018 MEDICARE YEARLY EXAM 3/21/2018 GLAUCOMA SCREENING Q2Y 12/1/2018 *Topic was postponed. The date shown is not the original due date. Allergies as of 8/7/2017  Review Complete On: 8/7/2017 By: Saul Clarke LPN No Known Allergies Current Immunizations  Reviewed on 5/4/2016 Name Date Influenza Vaccine 10/27/2015, 9/30/2014, 9/17/2013 Influenza Vaccine (Quad) PF 10/5/2016 Influenza Vaccine Split 11/14/2012, 10/14/2011, 10/11/2010 Influenza Vaccine Whole 10/11/2010, 9/9/2009 Pneumococcal Conjugate (PCV-13) 8/5/2015 TD Vaccine 9/6/2005 ZZZ-RETIRED (DO NOT USE) Pneumococcal Vaccine (Unspecified Type) 12/1/2010, 11/16/1997 Not reviewed this visit You Were Diagnosed With   
  
 Codes Comments Type 2 diabetes mellitus with diabetic neuropathy, without long-term current use of insulin (HCC)    -  Primary ICD-10-CM: E11.40 ICD-9-CM: 250.60, 357.2 Type 2 diabetes mellitus without complication, without long-term current use of insulin (HCC)     ICD-10-CM: E11.9 ICD-9-CM: 250.00 Pure hypercholesterolemia     ICD-10-CM: E78.00 ICD-9-CM: 272.0 Lower GI bleed     ICD-10-CM: K92.2 ICD-9-CM: 578.9 Vitals BP Pulse Temp Resp Height(growth percentile) Weight(growth percentile)  112/68 (BP 1 Location: Left arm, BP Patient Position: Sitting) 72 97.6 °F (36.4 °C) (Oral) 16 5' 9\" (1.753 m) 168 lb 6.4 oz (76.4 kg) SpO2 BMI Smoking Status 99% 24.87 kg/m2 Former Smoker Vitals History BMI and BSA Data Body Mass Index Body Surface Area  
 24.87 kg/m 2 1.93 m 2 Preferred Pharmacy Pharmacy Name Phone [facility] [address] [facility] [address] [phone] Your Updated Medication List  
  
   
This list is accurate as of: 8/7/17  8:18 AM.  Always use your most recent med list.  
  
  
  
  
 acetaminophen 650 mg CR tablet Commonly known as:  TYLENOL Take 650 mg by mouth nightly. aspirin 81 mg tablet Take 81 mg by mouth daily. enalapril 10 mg tablet Commonly known as:  VASOTEC  
TAKE ONE TABLET BY MOUTH ONCE DAILY  
  
 finasteride 5 mg tablet Commonly known as:  PROSCAR Take 5 mg by mouth daily (after dinner). hydroCHLOROthiazide 25 mg tablet Commonly known as:  HYDRODIURIL  
TAKE ONE TABLET BY MOUTH ONCE DAILY (GENERIC FOR HYDRODIURIL) metFORMIN 500 mg tablet Commonly known as:  GLUCOPHAGE  
TAKE ONE-HALF TABLET BY MOUTH TWICE DAILY WITH MEALS  
  
 simvastatin 20 mg tablet Commonly known as:  ZOCOR Take 1 Tab by mouth nightly. tamsulosin 0.4 mg capsule Commonly known as:  FLOMAX Take 0.4 mg by mouth daily (after dinner). VITAMIN D3 400 unit Tab tablet Generic drug:  cholecalciferol Take 400 Units by mouth daily. We Performed the Following CBC WITH AUTOMATED DIFF [75755 CPT(R)] HEMOGLOBIN A1C WITH EAG [05945 CPT(R)] LIPID PANEL [67946 CPT(R)] METABOLIC PANEL, COMPREHENSIVE [37311 CPT(R)] MICROALBUMIN, UR, RAND W/ MICROALBUMIN/CREA RATIO [id number] CPT(R)] Follow-up Instructions Return in about 4 months (around 12/7/2017) for fasting. Introducing [facility]!    
 [facility] [employer] introduces [employer] patient portal. Now you can access parts of your medical record, email your doctor's office, and request medication refills online. 1. In your internet browser, go to [email or web address] 2. Click on the First Time User? Click Here link in the Sign In box. You will see the New Member Sign Up page. 3. Enter your [employer] Access Code exactly as it appears below. You will not need to use this code after youve completed the sign-up process. If you do not sign up before the expiration date, you must request a new code. · [employer] Access Code: [email or web address] Expires: 11/5/2017  7:55 AM 
 
4. Enter the last four digits of your Social Security Number (xxxx) and Date of Birth (mm/dd/yyyy) as indicated and click Submit. You will be taken to the next sign-up page. 5. Create a [employer] ID. This will be your [employer] login ID and cannot be changed, so think of one that is secure and easy to remember. 6. Create a [employer] password. You can change your password at any time. 7. Enter your Password Reset Question and Answer. This can be used at a later time if you forget your password. 8. Enter your e-mail address. You will receive e-mail notification when new information is available in [address] 9. Click Sign Up. You can now view and download portions of your medical record. 10. Click the Download Summary menu link to download a portable copy of your medical information. If you have questions, please visit the Frequently Asked Questions section of the [employer] website. Remember, [employer] is NOT to be used for urgent needs. For medical emergencies, dial 911. Now available from your iPhone and Android! Please provide this summary of care documentation to your next provider. Your primary care clinician is listed as [address]. If you have any questions after today's visit, please call [phone].

## 2017-08-08 LAB
ALBUMIN SERPL-MCNC: 4.3 G/DL (ref 3.5–4.7)
ALBUMIN/CREAT UR: 3.4 MG/G CREAT (ref 0–30)
ALBUMIN/GLOB SERPL: 1.3 {RATIO} (ref 1.2–2.2)
ALP SERPL-CCNC: 57 IU/L (ref 39–117)
ALT SERPL-CCNC: 11 IU/L (ref 0–44)
AST SERPL-CCNC: 19 IU/L (ref 0–40)
BASOPHILS # BLD AUTO: 0 X10E3/UL (ref 0–0.2)
BASOPHILS NFR BLD AUTO: 0 %
BILIRUB SERPL-MCNC: 0.9 MG/DL (ref 0–1.2)
BUN SERPL-MCNC: 9 MG/DL (ref 8–27)
BUN/CREAT SERPL: 11 (ref 10–24)
CALCIUM SERPL-MCNC: 9.3 MG/DL (ref 8.6–10.2)
CHLORIDE SERPL-SCNC: 95 MMOL/L (ref 96–106)
CHOLEST SERPL-MCNC: 130 MG/DL (ref 100–199)
CO2 SERPL-SCNC: 24 MMOL/L (ref 18–29)
CREAT SERPL-MCNC: 0.85 MG/DL (ref 0.76–1.27)
CREAT UR-MCNC: 94.5 MG/DL
EOSINOPHIL # BLD AUTO: 0.1 X10E3/UL (ref 0–0.4)
EOSINOPHIL NFR BLD AUTO: 2 %
ERYTHROCYTE [DISTWIDTH] IN BLOOD BY AUTOMATED COUNT: 14.8 % (ref 12.3–15.4)
EST. AVERAGE GLUCOSE BLD GHB EST-MCNC: 126 MG/DL
GLOBULIN SER CALC-MCNC: 3.3 G/DL (ref 1.5–4.5)
GLUCOSE SERPL-MCNC: 90 MG/DL (ref 65–99)
HBA1C MFR BLD: 6 % (ref 4.8–5.6)
HCT VFR BLD AUTO: 46.8 % (ref 37.5–51)
HDLC SERPL-MCNC: 47 MG/DL
HGB BLD-MCNC: 15.9 G/DL (ref 12.6–17.7)
IMM GRANULOCYTES # BLD: 0 X10E3/UL (ref 0–0.1)
IMM GRANULOCYTES NFR BLD: 0 %
LDLC SERPL CALC-MCNC: 69 MG/DL (ref 0–99)
LYMPHOCYTES # BLD AUTO: 1 X10E3/UL (ref 0.7–3.1)
LYMPHOCYTES NFR BLD AUTO: 16 %
Lab: NORMAL
MCH RBC QN AUTO: 32.1 PG (ref 26.6–33)
MCHC RBC AUTO-ENTMCNC: 34 G/DL (ref 31.5–35.7)
MCV RBC AUTO: 94 FL (ref 79–97)
MICROALBUMIN UR-MCNC: 3.2 UG/ML
MONOCYTES # BLD AUTO: 0.6 X10E3/UL (ref 0.1–0.9)
MONOCYTES NFR BLD AUTO: 9 %
NEUTROPHILS # BLD AUTO: 4.2 X10E3/UL (ref 1.4–7)
NEUTROPHILS NFR BLD AUTO: 73 %
PLATELET # BLD AUTO: 141 X10E3/UL (ref 150–379)
POTASSIUM SERPL-SCNC: 4.2 MMOL/L (ref 3.5–5.2)
PROT SERPL-MCNC: 7.6 G/DL (ref 6–8.5)
RBC # BLD AUTO: 4.96 X10E6/UL (ref 4.14–5.8)
SODIUM SERPL-SCNC: 137 MMOL/L (ref 134–144)
TRIGL SERPL-MCNC: 71 MG/DL (ref 0–149)
VLDLC SERPL CALC-MCNC: 14 MG/DL (ref 5–40)
WBC # BLD AUTO: 5.8 X10E3/UL (ref 3.4–10.8)

## 2017-08-31 RX ORDER — BLOOD-GLUCOSE METER
EACH MISCELLANEOUS
Qty: 1 EACH | Refills: 0 | Status: SHIPPED | OUTPATIENT
Start: 2017-08-31

## 2017-08-31 RX ORDER — LANCETS 33 GAUGE
EACH MISCELLANEOUS
Qty: 50 LANCET | Refills: 5 | Status: SHIPPED | OUTPATIENT
Start: 2017-08-31 | End: 2017-08-31 | Stop reason: SDUPTHER

## 2017-09-01 RX ORDER — LANCETS 33 GAUGE
EACH MISCELLANEOUS
Qty: 100 LANCET | Refills: 4 | Status: SHIPPED | OUTPATIENT
Start: 2017-09-01

## 2017-09-20 ENCOUNTER — CLINICAL SUPPORT (OUTPATIENT)
Dept: FAMILY MEDICINE CLINIC | Age: 82
End: 2017-09-20

## 2017-09-20 VITALS — TEMPERATURE: 98 F

## 2017-09-20 DIAGNOSIS — Z23 ENCOUNTER FOR IMMUNIZATION: Primary | ICD-10-CM

## 2017-10-17 DIAGNOSIS — E78.00 PURE HYPERCHOLESTEROLEMIA: Chronic | ICD-10-CM

## 2017-10-17 RX ORDER — SIMVASTATIN 20 MG/1
20 TABLET, FILM COATED ORAL
Qty: 90 TAB | Refills: 3 | Status: SHIPPED | OUTPATIENT
Start: 2017-10-17 | End: 2018-07-16 | Stop reason: SDUPTHER

## 2017-12-08 ENCOUNTER — OFFICE VISIT (OUTPATIENT)
Dept: FAMILY MEDICINE CLINIC | Age: 82
End: 2017-12-08

## 2017-12-08 VITALS
RESPIRATION RATE: 16 BRPM | OXYGEN SATURATION: 98 % | DIASTOLIC BLOOD PRESSURE: 69 MMHG | WEIGHT: 174.2 LBS | BODY MASS INDEX: 25.8 KG/M2 | TEMPERATURE: 97.3 F | HEART RATE: 77 BPM | SYSTOLIC BLOOD PRESSURE: 122 MMHG | HEIGHT: 69 IN

## 2017-12-08 DIAGNOSIS — E11.40 TYPE 2 DIABETES MELLITUS WITH DIABETIC NEUROPATHY, WITHOUT LONG-TERM CURRENT USE OF INSULIN (HCC): Primary | Chronic | ICD-10-CM

## 2017-12-08 DIAGNOSIS — I10 ESSENTIAL HYPERTENSION: Chronic | ICD-10-CM

## 2017-12-08 DIAGNOSIS — C61 PROSTATE CANCER (HCC): Chronic | ICD-10-CM

## 2017-12-08 DIAGNOSIS — E78.00 PURE HYPERCHOLESTEROLEMIA: Chronic | ICD-10-CM

## 2017-12-08 DIAGNOSIS — L30.1 DYSHIDROTIC ECZEMA: ICD-10-CM

## 2017-12-08 RX ORDER — TRIAMCINOLONE ACETONIDE 1 MG/G
CREAM TOPICAL 2 TIMES DAILY
Qty: 85.2 G | Refills: 4 | Status: SHIPPED | OUTPATIENT
Start: 2017-12-08

## 2017-12-08 NOTE — MR AVS SNAPSHOT
Visit Information Date & Time Provider Department Dept. Phone Encounter #  
 12/8/2017  8:00 AM Aidan Leyva MD [address] [place] [address] [id number] Follow-up Instructions Return in about 4 months (around 4/8/2018). Your Appointments 3/23/2018  1:25 PM  
Medicare Physical with Aidan Leyva MD  
[facility] Appt Note: -letter mailed; [place] [address] [address]  
[place] [address] [address] Upcoming Health Maintenance Date Due DTaP/Tdap/Td series (1 - Tdap) 9/7/2005 EYE EXAM RETINAL OR DILATED Q1 12/1/2017 FOOT EXAM Q1 1/4/2018 HEMOGLOBIN A1C Q6M 2/7/2018 MEDICARE YEARLY EXAM 3/21/2018 MICROALBUMIN Q1 8/7/2018 LIPID PANEL Q1 8/7/2018 GLAUCOMA SCREENING Q2Y 12/1/2018 Allergies as of 12/8/2017  Review Complete On: 12/8/2017 By: Wendy Artis LPN No Known Allergies Current Immunizations  Reviewed on 5/4/2016 Name Date Influenza High Dose Vaccine PF 9/20/2017 Influenza Vaccine 10/27/2015, 9/30/2014, 9/17/2013 Influenza Vaccine (Quad) PF 10/5/2016 Influenza Vaccine Split 11/14/2012, 10/14/2011, 10/11/2010 Influenza Vaccine Whole 10/11/2010, 9/9/2009 Pneumococcal Conjugate (PCV-13) 8/5/2015 TD Vaccine 9/6/2005 ZZZ-RETIRED (DO NOT USE) Pneumococcal Vaccine (Unspecified Type) 12/1/2010, 11/16/1997 Not reviewed this visit You Were Diagnosed With   
  
 Codes Comments Type 2 diabetes mellitus with diabetic neuropathy, without long-term current use of insulin (HCC)    -  Primary ICD-10-CM: E11.40 ICD-9-CM: 250.60, 357.2 Pure hypercholesterolemia     ICD-10-CM: E78.00 ICD-9-CM: 272.0 Essential hypertension     ICD-10-CM: I10 
ICD-9-CM: 401.9 Prostate cancer [facility])     ICD-10-CM: Z95 ICD-9-CM: 961 Dyshidrotic eczema     ICD-10-CM: L30.1 ICD-9-CM: 705.81 Vitals BP Pulse Temp Resp Height(growth percentile) Weight(growth percentile) 122/69 (BP 1 Location: Right arm, BP Patient Position: Sitting) 77 97.3 °F (36.3 °C) (Oral) 16 5' 9\" (1.753 m) 174 lb 3.2 oz (79 kg) SpO2 BMI Smoking Status 98% 25.72 kg/m2 Former Smoker Vitals History BMI and BSA Data Body Mass Index Body Surface Area 25.72 kg/m 2 1.96 m 2 Preferred Pharmacy Pharmacy Name Phone [facility], [facility] [address] [phone] Your Updated Medication List  
  
   
This list is accurate as of: 12/8/17  8:12 AM.  Always use your most recent med list.  
  
  
  
  
 acetaminophen 650 mg Tber Commonly known as:  TYLENOL Take 650 mg by mouth nightly. aspirin 81 mg tablet Take 81 mg by mouth daily. Blood-Glucose Meter Misc Commonly known as:  TRUE METRIX GLUCOSE METER Use as directed to check blood sugar once daily. Dx:E11.9 Please dispense appropriate product diph,Pertuss(Acell),Tet Vac-PF 2 Lf-(2.5-5-3-5 mcg)-5Lf/0.5 mL susp Commonly known as:  ADACEL  
0.5 mL by IntraMUSCular route once for 1 dose. To be administered at Pharmacy. Fax confirmation to me at [phone]. Thank You.  
  
 enalapril 10 mg tablet Commonly known as:  VASOTEC  
TAKE ONE TABLET BY MOUTH ONCE DAILY  
  
 finasteride 5 mg tablet Commonly known as:  PROSCAR Take 5 mg by mouth daily (after dinner). * glucose blood VI test strips strip Commonly known as:  TRUETEST TEST STRIPS Use as directed to check blood sugar once daily. Dx:E11.9  
  
 * glucose blood VI test strips strip Commonly known as:  TRUE METRIX GLUCOSE TEST STRIP Use as directed to check blood sugar once daily. Dx:E11.9 Please dispense appropriate product  
  
 hydroCHLOROthiazide 25 mg tablet Commonly known as:  HYDRODIURIL  
 TAKE ONE TABLET BY MOUTH ONCE DAILY (GENERIC  FOR  HYDRODIURIL)  
  
 lancets 33 gauge Misc Commonly known as:  Roxene Fermo Use as directed to check blood sugar once daily. Dx:E11.9 Please dispense appropriate product  
  
 metFORMIN 500 mg tablet Commonly known as:  GLUCOPHAGE  
TAKE ONE-HALF TABLET BY MOUTH TWICE DAILY WITH MEALS  
  
 simvastatin 20 mg tablet Commonly known as:  ZOCOR Take 1 Tab by mouth nightly. tamsulosin 0.4 mg capsule Commonly known as:  FLOMAX Take 0.4 mg by mouth daily (after dinner). triamcinolone acetonide 0.1 % topical cream  
Commonly known as:  KENALOG Apply  to affected area two (2) times a day. use thin layer VITAMIN D3 400 unit Tab tablet Generic drug:  cholecalciferol Take 400 Units by mouth daily. * Notice: This list has 2 medication(s) that are the same as other medications prescribed for you. Read the directions carefully, and ask your doctor or other care provider to review them with you. Prescriptions Printed Refills diph,Pertuss,Acell,,Tet Vac-PF (ADACEL) 2 Lf-(2.5-5-3-5 mcg)-5Lf/0.5 mL susp 0 Si.5 mL by IntraMUSCular route once for 1 dose. To be administered at Pharmacy. Fax confirmation to me at [phone]. Thank You. Class: Print Route: IntraMUSCular Prescriptions Sent to Pharmacy Refills  
 triamcinolone acetonide (KENALOG) 0.1 % topical cream 4 Sig: Apply  to affected area two (2) times a day. use thin layer Class: Normal  
 Pharmacy: [address] [place] [address] Ph #: [phone] Route: Topical  
  
We Performed the Following HEMOGLOBIN A1C WITH EAG [03350 CPT(R)] HEMOGLOBIN K909010 CPT(R)] LIPID PANEL [24182 CPT(R)] METABOLIC PANEL, COMPREHENSIVE [72702 CPT(R)] Follow-up Instructions Return in about 4 months (around 2018). Patient Instructions Diabetes Foot Health: Care Instructions Your Care Instructions When you have diabetes, your feet need extra care and attention. Diabetes can damage the nerve endings and blood vessels in your feet, making you less likely to notice when your feet are injured. Diabetes also limits your body's ability to fight infection and get blood to areas that need it. If you get a minor foot injury, it could become an ulcer or a serious infection. With good foot care, you can prevent most of these problems. Caring for your feet can be quick and easy. Most of the care can be done when you are bathing or getting ready for bed. Follow-up care is a key part of your treatment and safety. Be sure to make and go to all appointments, and call your doctor if you are having problems. It's also a good idea to know your test results and keep a list of the medicines you take. How can you care for yourself at home? · Keep your blood sugar close to normal by watching what and how much you eat, monitoring blood sugar, taking medicines if prescribed, and getting regular exercise. · Do not smoke. Smoking affects blood flow and can make foot problems worse. If you need help quitting, talk to your doctor about stop-smoking programs and medicines. These can increase your chances of quitting for good. · Eat a diet that is low in fats. High fat intake can cause fat to build up in your blood vessels and decrease blood flow. · Inspect your feet daily for blisters, cuts, cracks, or sores. If you cannot see well, use a mirror or have someone help you. · Take care of your feet: 
[facility] AUTHOR[employer] your feet every day. Use warm (not hot) water. Check the water temperature with your wrists or other part of your body, not your feet. ¨ Dry your feet well. Pat them dry. Do not rub the skin on your feet too hard. Dry well between your toes. If the skin on your feet stays moist, bacteria or a fungus can grow, which can lead to infection. ¨ Keep your skin soft. Use moisturizing skin cream to keep the skin on your feet soft and prevent calluses and cracks. But do not put the cream between your toes, and stop using any cream that causes a rash. ¨ Clean underneath your toenails carefully. Do not use a sharp object to clean underneath your toenails. Use the blunt end of a nail file or other rounded tool. ¨ Trim and file your toenails straight across to prevent ingrown toenails. Use a nail clipper, not scissors. Use an emery board to smooth the edges. · Change socks daily. Socks without seams are best, because seams often rub the feet. You can find socks for people with diabetes from specialty catalogs. · Look inside your shoes every day for things like gravel or torn linings, which could cause blisters or sores. · Buy shoes that fit well: 
¨ Look for shoes that have plenty of space around the toes. This helps prevent bunions and blisters. ¨ Try on shoes while wearing the kind of socks you will usually wear with the shoes. ¨ Avoid plastic shoes. They may rub your feet and cause blisters. Good shoes should be made of materials that are flexible and breathable, such as leather or cloth. ¨ Break in new shoes slowly by wearing them for no more than an hour a day for several days. Take extra time to check your feet for red areas, blisters, or other problems after you wear new shoes. · Do not go barefoot. Do not wear sandals, and do not wear shoes with very thin soles. Thin soles are easy to puncture. They also do not protect your feet from hot pavement or cold weather. · Have your doctor check your feet during each visit. If you have a foot problem, see your doctor. Do not try to treat an early foot problem at home. Home remedies or treatments that you can buy without a prescription (such as corn removers) can be harmful. · Always get early treatment for foot problems. A minor irritation can lead to a major problem if not properly cared for early. When should you call for help? Call your doctor now or seek immediate medical care if: 
? · You have a foot sore, an ulcer or break in the skin that is not healing after 4 days, bleeding corns or calluses, or an ingrown toenail. ? · You have blue or black areas, which can mean bruising or blood flow problems. ? · You have peeling skin or tiny blisters between your toes or cracking or oozing of the skin. ? · You have a fever for more than 24 hours and a foot sore. ? · You have new numbness or tingling in your feet that does not go away after you move your feet or change positions. ? · You have unexplained or unusual swelling of the foot or ankle. ? Watch closely for changes in your health, and be sure to contact your doctor if: 
? · You cannot do proper foot care. Where can you learn more? Go to http://marylu-jennifer.info/. Enter A739 in the search box to learn more about \"Diabetes Foot Health: Care Instructions. \" Current as of: March 13, 2017 Content Version: 11.4 © 5822-9248 [employer]. Care instructions adapted under license by [employer] (which disclaims liability or warranty for this information). If you have questions about a medical condition or this instruction, always ask your healthcare professional. [address] any warranty or liability for your use of this information. Body Mass Index: Care Instructions Your Care Instructions Body mass index (BMI) can help you see if your weight is raising your risk for health problems. It uses a formula to compare how much you weigh with how tall you are. · A BMI lower than 18.5 is considered underweight. · A BMI between 18.5 and 24.9 is considered healthy. · A BMI between 25 and 29.9 is considered overweight. A BMI of 30 or higher is considered obese.  
If your BMI is in the normal range, it means that you have a lower risk for weight-related health problems. If your BMI is in the overweight or obese range, you may be at increased risk for weight-related health problems, such as high blood pressure, heart disease, stroke, arthritis or joint pain, and diabetes. If your BMI is in the underweight range, you may be at increased risk for health problems such as fatigue, lower protection (immunity) against illness, muscle loss, bone loss, hair loss, and hormone problems. BMI is just one measure of your risk for weight-related health problems. You may be at higher risk for health problems if you are not active, you eat an unhealthy diet, or you drink too much alcohol or use tobacco products. Follow-up care is a key part of your treatment and safety. Be sure to make and go to all appointments, and call your doctor if you are having problems. It's also a good idea to know your test results and keep a list of the medicines you take. How can you care for yourself at home? · Practice healthy eating habits. This includes eating plenty of fruits, vegetables, whole grains, lean protein, and low-fat dairy. · If your doctor recommends it, get more exercise. Walking is a good choice. Bit by bit, increase the amount you walk every day. Try for at least 30 minutes on most days of the week. · Do not smoke. Smoking can increase your risk for health problems. If you need help quitting, talk to your doctor about stop-smoking programs and medicines. These can increase your chances of quitting for good. · Limit alcohol to 2 drinks a day for men and 1 drink a day for women. Too much alcohol can cause health problems. If you have a BMI higher than 25 · Your doctor may do other tests to check your risk for weight-related health problems. This may include measuring the distance around your waist. A waist measurement of more than 40 inches in men or 35 inches in women can increase the risk of weight-related health problems. · Talk with your doctor about steps you can take to stay healthy or improve your health. You may need to make lifestyle changes to lose weight and stay healthy, such as changing your diet and getting regular exercise. If you have a BMI lower than 18.5 · Your doctor may do other tests to check your risk for health problems. · Talk with your doctor about steps you can take to stay healthy or improve your health. You may need to make lifestyle changes to gain or maintain weight and stay healthy, such as getting more healthy foods in your diet and doing exercises to build muscle. Where can you learn more? Go to http://marylu-jennifer.info/. Enter S176 in the search box to learn more about \"Body Mass Index: Care Instructions. \" Current as of: October 13, 2016 Content Version: 11.4 © 3055-9534 [employer]. Care instructions adapted under license by [employer] (which disclaims liability or warranty for this information). If you have questions about a medical condition or this instruction, always ask your healthcare professional. [place] [address] any warranty or liability for your use of this information. Introducing [facility]! Meenakshi Washburn introduces [employer] patient portal. Now you can access parts of your medical record, email your doctor's office, and request medication refills online. 1. In your internet browser, go to [email or web address] 2. Click on the First Time User? Click Here link in the Sign In box. You will see the New Member Sign Up page. 3. Enter your [employer] Access Code exactly as it appears below. You will not need to use this code after youve completed the sign-up process. If you do not sign up before the expiration date, you must request a new code. · [employer] Access Code: [email or web address] Expires: 3/8/2018  8:06 AM 
 
4.  Enter the last four digits of your Social Security Number (xxxx) and Date of Birth (mm/dd/yyyy) as indicated and click Submit. You will be taken to the next sign-up page. 5. Create a [employer] ID. This will be your [employer] login ID and cannot be changed, so think of one that is secure and easy to remember. 6. Create a [employer] password. You can change your password at any time. 7. Enter your Password Reset Question and Answer. This can be used at a later time if you forget your password. 8. Enter your e-mail address. You will receive e-mail notification when new information is available in [address] 9. Click Sign Up. You can now view and download portions of your medical record. 10. Click the Download Summary menu link to download a portable copy of your medical information. If you have questions, please visit the Frequently Asked Questions section of the [employer] website. Remember, [employer] is NOT to be used for urgent needs. For medical emergencies, dial 911. Now available from your iPhone and Android! Please provide this summary of care documentation to your next provider. Your primary care clinician is listed as [address]. If you have any questions after today's visit, please call [phone].

## 2017-12-08 NOTE — PROGRESS NOTES
Chief Complaint   Patient presents with    Labs     fasting    Cholesterol Problem    Hypertension     Body mass index is 25.72 kg/(m^2). 1. Have you been to the ER, urgent care clinic since your last visit? Hospitalized since your last visit? No    2. Have you seen or consulted any other health care providers outside of the [address] [place] [address] since your last visit? Include any pap smears or colon screening. No    Reviewed record in preparation for visit and have necessary documentation  Pt did not bring medication to office visit for review  Information was given to pt on Advanced Directives, Living Will  Information was given on Shingles Vaccine  Opportunity was given for questions  Goals that were addressed and/or need to be completed after this appointment include:     Health Maintenance Due   Topic Date Due    DTaP/Tdap/Td series (1 - Tdap) 09/07/2005    EYE EXAM RETINAL OR DILATED Q1  12/01/2017    FOOT EXAM Q1  01/04/2018     - check for functional glucose monitor and record keeping system: Yes, checks sugar twice a day  Pt was given BS record log to document home readings and return to office for review  Diabetic Bundle:  LDL-69  A1C-6.0  BP-122/69  Smoking? No  Anticoagulation medication?  yes  Eye exam dilated?good  Foot exam?Due      Records requested from Dr. Josué Talbot office

## 2017-12-08 NOTE — PROGRESS NOTES
Discussed the patient's BMI with him. The BMI follow up plan is as follows: He has gained 6 lbs since LOV. dietary management education, guidance, and counseling  encourage exercise  monitor weight  prescribed dietary intake    An After Visit Summary was printed and given to the patient. Progress Note    Patient: Asha Willis MRN: [id number]  SSN: [id number]    YOB: 1927  Age: 80 y.o. Sex: male        Chief Complaint   Patient presents with    Labs     fasting    Cholesterol Problem    Hypertension         Subjective:     Encounter Diagnoses   Name Primary?  Type 2 diabetes mellitus with diabetic neuropathy, without long-term current use of insulin [facility] This patient is managed under a comprehensive plan of care for Diabetes. Overall the patient feels well with good energy level. Pertinent Labs:   Lab Results   Component Value Date/Time    Hemoglobin A1c 6.0 08/07/2017 10:40 AM    Hemoglobin A1c 5.8 01/04/2017 08:24 AM    Hemoglobin A1c 6.0 05/04/2016 08:22 AM      Body mass index is 25.72 kg/(m^2). Lab Results   Component Value Date/Time    LDL, calculated 69 08/07/2017 10:40 AM         Lab Results   Component Value Date/Time    Sodium 137 08/07/2017 10:40 AM    Potassium 4.2 08/07/2017 10:40 AM    Chloride 95 08/07/2017 10:40 AM    CO2 24 08/07/2017 10:40 AM    Anion gap 8 04/10/2017 06:58 AM    Glucose 90 08/07/2017 10:40 AM    BUN 9 08/07/2017 10:40 AM    Creatinine 0.85 08/07/2017 10:40 AM    BUN/Creatinine ratio 11 08/07/2017 10:40 AM    GFR est AA 89 08/07/2017 10:40 AM    GFR est non-AA 77 08/07/2017 10:40 AM    Calcium 9.3 08/07/2017 10:40 AM    AST (SGOT) 19 08/07/2017 10:40 AM    Alk.  phosphatase 57 08/07/2017 10:40 AM    Protein, total 7.6 08/07/2017 10:40 AM    Albumin 4.3 08/07/2017 10:40 AM    Globulin 3.8 04/10/2017 06:58 AM    A-G Ratio 1.3 08/07/2017 10:40 AM    ALT (SGPT) 11 08/07/2017 10:40 AM     Lab Results   Component Value Date/Time Microalbumin/Creat ratio (mg/g creat) 5 07/15/2010 08:52 AM    Microalb/Creat ratio (ug/mg creat.) 3.4 08/07/2017 10:40 AM    Microalbumin,urine random 0.77 07/15/2010 08:52 AM      Frequency of home glucose testing:no logs   Blood Sugar range at home:    Last eye exam: In past 12 months. Last foot exam: today. Polyuria, polyphagia or polydipsia: No   Retinopathy: No   Neuropathy SX: Not now. Low blood sugar symptoms: No   Dietary compliance: Good but he has gained 6 pounds. BMI is now high counseled regarding weight loss and diet. Medication compliance:Good   On ASA: Yes   Depression: No   CKD:no     Wt Readings from Last 3 Encounters:   12/08/17 174 lb 3.2 oz (79 kg)   08/07/17 168 lb 6.4 oz (76.4 kg)   04/14/17 167 lb (75.8 kg)        History   Smoking Status    Former Smoker    Packs/day: 1.00    Years: 15.00   Smokeless Tobacco    Never Used        All the patient's questions regarding medications, diet and exercise were answered. Goal of A1C of less than 7.5% is our goal.   Our overall goal is to reduce or eliminate the long term consequences of poorly controlled diabetes. Yes    Pure hypercholesterolemia:  Cardiovascular risks for him are: LDL goal is under 80  diabetic  hypertension  hyperlipidemia. Currently he takes Zocor (simvastatin) , 20 mg. Lab Results   Component Value Date/Time    Cholesterol, total 130 08/07/2017 10:40 AM    HDL Cholesterol 47 08/07/2017 10:40 AM    LDL, calculated 69 08/07/2017 10:40 AM    Triglyceride 71 08/07/2017 10:40 AM    CHOL/HDL Ratio 3.1 07/15/2010 08:52 AM     Lab Results   Component Value Date/Time    ALT (SGPT) 11 08/07/2017 10:40 AM    AST (SGOT) 19 08/07/2017 10:40 AM    Alk.  phosphatase 57 08/07/2017 10:40 AM    Bilirubin, total 0.9 08/07/2017 10:40 AM      Myalgias: No   Fatigue: No   Other side effects: no  Wt Readings from Last 3 Encounters:   12/08/17 174 lb 3.2 oz (79 kg)   08/07/17 168 lb 6.4 oz (76.4 kg)   04/14/17 167 lb (75.8 kg) The patient is aware of our goal to reduce or eliminate the long term problems (such as strokes and heart attacks) related to poorly controlled hyperlipidemia.  Essential hypertension:  BP Readings from Last 3 Encounters:   12/08/17 122/69   08/07/17 112/68   04/14/17 125/65     The patient reports:  taking medications as instructed, no medication side effects noted, no TIA's, no chest pain on exertion, no dyspnea on exertion, no swelling of ankles. Lab Results   Component Value Date/Time    Sodium 137 08/07/2017 10:40 AM    Potassium 4.2 08/07/2017 10:40 AM    Chloride 95 08/07/2017 10:40 AM    CO2 24 08/07/2017 10:40 AM    Anion gap 8 04/10/2017 06:58 AM    Glucose 90 08/07/2017 10:40 AM    BUN 9 08/07/2017 10:40 AM    Creatinine 0.85 08/07/2017 10:40 AM    BUN/Creatinine ratio 11 08/07/2017 10:40 AM    GFR est AA 89 08/07/2017 10:40 AM    GFR est non-AA 77 08/07/2017 10:40 AM    Calcium 9.3 08/07/2017 10:40 AM    Bilirubin, total 0.9 08/07/2017 10:40 AM    AST (SGOT) 19 08/07/2017 10:40 AM    Alk. phosphatase 57 08/07/2017 10:40 AM    Protein, total 7.6 08/07/2017 10:40 AM    Albumin 4.3 08/07/2017 10:40 AM    Globulin 3.8 04/10/2017 06:58 AM    A-G Ratio 1.3 08/07/2017 10:40 AM    ALT (SGPT) 11 08/07/2017 10:40 AM     Our goal is to normalize the blood pressure to decrease the risks of strokes and heart attacks. The patient is in agreement with the plan.  Prostate cancer [facility]): No symptoms of recurrence. No difficulty passing his water.  Dyshidrotic eczema: Gets a blistery rash on his hand when he feels potatoes or works in his garden. Feet apparently are spared. Current and past medical information:    Current Medications after this visit[de-identified]   Current Outpatient Prescriptions   Medication Sig    diph,Pertuss,Acell,,Tet Vac-PF (ADACEL) 2 Lf-(2.5-5-3-5 mcg)-5Lf/0.5 mL susp 0.5 mL by IntraMUSCular route once for 1 dose. To be administered at Pharmacy.  Fax confirmation to me at [phone]. Thank You.  triamcinolone acetonide (KENALOG) 0.1 % topical cream Apply  to affected area two (2) times a day. use thin layer    hydroCHLOROthiazide (HYDRODIURIL) 25 mg tablet TAKE ONE TABLET BY MOUTH ONCE DAILY (GENERIC  FOR  HYDRODIURIL)    enalapril (VASOTEC) 10 mg tablet TAKE ONE TABLET BY MOUTH ONCE DAILY    simvastatin (ZOCOR) 20 mg tablet Take 1 Tab by mouth nightly.  lancets (TRUEPLUS LANCETS) 33 gauge misc Use as directed to check blood sugar once daily. Dx:E11.9 Please dispense appropriate product    glucose blood VI test strips (TRUETEST TEST STRIPS) strip Use as directed to check blood sugar once daily. Dx:E11.9    glucose blood VI test strips (TRUE METRIX GLUCOSE TEST STRIP) strip Use as directed to check blood sugar once daily. Dx:E11.9 Please dispense appropriate product    acetaminophen (TYLENOL) 650 mg CR tablet Take 650 mg by mouth nightly.  tamsulosin (FLOMAX) 0.4 mg capsule Take 0.4 mg by mouth daily (after dinner).  finasteride (PROSCAR) 5 mg tablet Take 5 mg by mouth daily (after dinner).  cholecalciferol (VITAMIN D-3) 400 unit Tab tablet Take 400 Units by mouth daily.  aspirin 81 mg Tab Take 81 mg by mouth daily.  Blood-Glucose Meter (TRUE METRIX GLUCOSE METER) misc Use as directed to check blood sugar once daily. Dx:E11.9 Please dispense appropriate product    metFORMIN (GLUCOPHAGE) 500 mg tablet TAKE ONE-HALF TABLET BY MOUTH TWICE DAILY WITH MEALS     No current facility-administered medications for this visit.         Patient Active Problem List    Diagnosis Date Noted    Type 2 diabetes mellitus with diabetic neuropathy ([facility] [250.60, 357.2] 08/05/2015     Priority: 1 - One    [facility] 03/10/2014     Priority: 1 - One    Colon polyp 11/16/2010     Priority: 1 - One    Hyperlipidemia 11/16/2010     Priority: 1 - One    Hypertension      Priority: 1 - One    Erythrocytosis      Priority: 1 - One    Erectile dysfunction Priority: 1 - One    Lower GI bleed 08/07/2017    Prostate cancer ([facility] 01/27/2011    Hyperkeratosis 11/16/2010       Past Medical History:   Diagnosis Date    Allergic rhinitis     Benign prostatic hypertrophy     Erectile dysfunction     Erythrocytosis     Hypertension     Polyp, colon        No Known Allergies    Past Surgical History:   Procedure Laterality Date    COLONOSCOPY N/A 4/10/2017    COLONOSCOPY performed by Ronald Venegas MD at [facility]  4/10/2017            Social History     Social History    Marital status:      Spouse name: N/A    Number of children: N/A    Years of education: N/A     Social History Main Topics    Smoking status: Former Smoker     Packs/day: 1.00     Years: 15.00    Smokeless tobacco: Never Used    Alcohol use No    Drug use: No    Sexual activity: Not Asked     Other Topics Concern    None     Social History Narrative       Review of Systems   Constitutional: Negative. Negative for chills, fever, malaise/fatigue and weight loss. HENT: Negative. Negative for hearing loss. Eyes: Negative. Negative for blurred vision and double vision. Respiratory: Negative. Negative for cough, hemoptysis, sputum production and shortness of breath. Cardiovascular: Negative. Negative for chest pain, palpitations and orthopnea. Gastrointestinal: Negative. Negative for abdominal pain, blood in stool, heartburn, nausea and vomiting. Genitourinary: Negative. Negative for dysuria, frequency and urgency. Musculoskeletal: Negative. Negative for back pain, myalgias and neck pain. Skin: Negative. Negative for rash. Neurological: Negative. Negative for dizziness, tingling, tremors, weakness and headaches. He no longer complains of numbness and tingling of his feet   Endo/Heme/Allergies: Negative. Psychiatric/Behavioral: Negative. Negative for depression.         Objective:     Vitals:    12/08/17 0801 BP: 122/69   Pulse: 77   Resp: 16   Temp: 97.3 °F (36.3 °C)   TempSrc: Oral   SpO2: 98%   Weight: 174 lb 3.2 oz (79 kg)   Height: 5' 9\" (1.753 m)      Body mass index is 25.72 kg/(m^2). Physical Exam   Constitutional: He is oriented to person, place, and time and well-developed, well-nourished, and in no distress. HENT:   Head: Normocephalic and atraumatic. Mouth/Throat: Oropharynx is clear and moist.   Eyes: Right eye exhibits no discharge. Left eye exhibits no discharge. No scleral icterus. Neck: No tracheal deviation present. No thyromegaly present. No bruit. Cardiovascular: Normal rate, regular rhythm and normal heart sounds. Pulmonary/Chest: Effort normal and breath sounds normal.   Abdominal: Soft. Neurological: He is alert and oriented to person, place, and time. Diabetic foot exam:  Sensation: He missed about 20% of his monofilament touches. No particular pattern. Calluses or corns: Yes  Pulses: Difficult to palpate. Feet are warm  Fungal nails: no     Skin: No rash noted. No erythema. Psychiatric: Mood and affect normal.   Nursing note and vitals reviewed. Health Maintenance Due   Topic Date Due    EYE EXAM RETINAL OR DILATED Q1  12/01/2017         Assessment and orders:       ICD-10-CM ICD-9-CM    1. Type 2 diabetes mellitus with diabetic neuropathy, without long-term current use of insulin (HCC)-recheck labs E11.40 250.60 HEMOGLOBIN A1C WITH EAG     357.2 LIPID PANEL      METABOLIC PANEL, COMPREHENSIVE       DIABETES FOOT EXAM   2. Pure hypercholesterolemia-recheck labs E78.00 272.0 LIPID PANEL      METABOLIC PANEL, COMPREHENSIVE   3. Essential hypertension-controlled I10 401.9 LIPID PANEL      METABOLIC PANEL, COMPREHENSIVE      HEMOGLOBIN   4. Prostate cancer (HCC)-no symptoms of recurrence   C61 185    5.  Dyshidrotic eczema-apparent allergy to potato skins L30.1 705.81 triamcinolone acetonide (KENALOG) 0.1 % topical cream         Plan of care:  Discussed diagnoses in detail with patient. Medication risks/benefits/side effects discussed with patient. All of the patient's questions were addressed. The patient understands and agrees with our plan of care. The patient knows to call back if they are unsure of or forget any changes we discussed today or if the symptoms change. The patient received an After-Visit Summary which contains VS, orders, medication list and allergy list. This can be used as a \"mini-medical record\" should they have to seek medical care while out of town. Patient Care Team:  Gopi Vázquez MD as PCP - General Tiara Dumont RN as Ambulatory Care Navigator    Follow-up Disposition:  Return in about 4 months (around 4/8/2018).     Future Appointments  Date Time Provider Fatou Jessica   3/23/2018 1:25 PM Gopi Vázquez MD [facility] NIMESH SCHED       Signed By: Gopi Vázquez MD     December 8, 2017

## 2017-12-08 NOTE — PATIENT INSTRUCTIONS
Diabetes Foot Health: Care Instructions  Your Care Instructions    When you have diabetes, your feet need extra care and attention. Diabetes can damage the nerve endings and blood vessels in your feet, making you less likely to notice when your feet are injured. Diabetes also limits your body's ability to fight infection and get blood to areas that need it. If you get a minor foot injury, it could become an ulcer or a serious infection. With good foot care, you can prevent most of these problems. Caring for your feet can be quick and easy. Most of the care can be done when you are bathing or getting ready for bed. Follow-up care is a key part of your treatment and safety. Be sure to make and go to all appointments, and call your doctor if you are having problems. It's also a good idea to know your test results and keep a list of the medicines you take. How can you care for yourself at home? · Keep your blood sugar close to normal by watching what and how much you eat, monitoring blood sugar, taking medicines if prescribed, and getting regular exercise. · Do not smoke. Smoking affects blood flow and can make foot problems worse. If you need help quitting, talk to your doctor about stop-smoking programs and medicines. These can increase your chances of quitting for good. · Eat a diet that is low in fats. High fat intake can cause fat to build up in your blood vessels and decrease blood flow. · Inspect your feet daily for blisters, cuts, cracks, or sores. If you cannot see well, use a mirror or have someone help you. · Take care of your feet:  [facility] your feet every day. Use warm (not hot) water. Check the water temperature with your wrists or other part of your body, not your feet. ¨ Dry your feet well. Pat them dry. Do not rub the skin on your feet too hard. Dry well between your toes. If the skin on your feet stays moist, bacteria or a fungus can grow, which can lead to infection.   ¨ Keep your skin soft. Use moisturizing skin cream to keep the skin on your feet soft and prevent calluses and cracks. But do not put the cream between your toes, and stop using any cream that causes a rash. ¨ Clean underneath your toenails carefully. Do not use a sharp object to clean underneath your toenails. Use the blunt end of a nail file or other rounded tool. ¨ Trim and file your toenails straight across to prevent ingrown toenails. Use a nail clipper, not scissors. Use an emery board to smooth the edges. · Change socks daily. Socks without seams are best, because seams often rub the feet. You can find socks for people with diabetes from specialty catalogs. · Look inside your shoes every day for things like gravel or torn linings, which could cause blisters or sores. · Buy shoes that fit well:  ¨ Look for shoes that have plenty of space around the toes. This helps prevent bunions and blisters. ¨ Try on shoes while wearing the kind of socks you will usually wear with the shoes. ¨ Avoid plastic shoes. They may rub your feet and cause blisters. Good shoes should be made of materials that are flexible and breathable, such as leather or cloth. ¨ Break in new shoes slowly by wearing them for no more than an hour a day for several days. Take extra time to check your feet for red areas, blisters, or other problems after you wear new shoes. · Do not go barefoot. Do not wear sandals, and do not wear shoes with very thin soles. Thin soles are easy to puncture. They also do not protect your feet from hot pavement or cold weather. · Have your doctor check your feet during each visit. If you have a foot problem, see your doctor. Do not try to treat an early foot problem at home. Home remedies or treatments that you can buy without a prescription (such as corn removers) can be harmful. · Always get early treatment for foot problems. A minor irritation can lead to a major problem if not properly cared for early.   When should you call for help? Call your doctor now or seek immediate medical care if:  ? · You have a foot sore, an ulcer or break in the skin that is not healing after 4 days, bleeding corns or calluses, or an ingrown toenail. ? · You have blue or black areas, which can mean bruising or blood flow problems. ? · You have peeling skin or tiny blisters between your toes or cracking or oozing of the skin. ? · You have a fever for more than 24 hours and a foot sore. ? · You have new numbness or tingling in your feet that does not go away after you move your feet or change positions. ? · You have unexplained or unusual swelling of the foot or ankle. ? Watch closely for changes in your health, and be sure to contact your doctor if:  ? · You cannot do proper foot care. Where can you learn more? Go to http://marylu-jennifer.info/. Enter A739 in the search box to learn more about \"Diabetes Foot Health: Care Instructions. \"  Current as of: March 13, 2017  Content Version: 11.4  © 5170-2472 [employer]. Care instructions adapted under license by [employer] (which disclaims liability or warranty for this information). If you have questions about a medical condition or this instruction, always ask your healthcare professional. [address] any warranty or liability for your use of this information. Body Mass Index: Care Instructions  Your Care Instructions    Body mass index (BMI) can help you see if your weight is raising your risk for health problems. It uses a formula to compare how much you weigh with how tall you are. · A BMI lower than 18.5 is considered underweight. · A BMI between 18.5 and 24.9 is considered healthy. · A BMI between 25 and 29.9 is considered overweight. A BMI of 30 or higher is considered obese. If your BMI is in the normal range, it means that you have a lower risk for weight-related health problems.  If your BMI is in the overweight or obese range, you may be at increased risk for weight-related health problems, such as high blood pressure, heart disease, stroke, arthritis or joint pain, and diabetes. If your BMI is in the underweight range, you may be at increased risk for health problems such as fatigue, lower protection (immunity) against illness, muscle loss, bone loss, hair loss, and hormone problems. BMI is just one measure of your risk for weight-related health problems. You may be at higher risk for health problems if you are not active, you eat an unhealthy diet, or you drink too much alcohol or use tobacco products. Follow-up care is a key part of your treatment and safety. Be sure to make and go to all appointments, and call your doctor if you are having problems. It's also a good idea to know your test results and keep a list of the medicines you take. How can you care for yourself at home? · Practice healthy eating habits. This includes eating plenty of fruits, vegetables, whole grains, lean protein, and low-fat dairy. · If your doctor recommends it, get more exercise. Walking is a good choice. Bit by bit, increase the amount you walk every day. Try for at least 30 minutes on most days of the week. · Do not smoke. Smoking can increase your risk for health problems. If you need help quitting, talk to your doctor about stop-smoking programs and medicines. These can increase your chances of quitting for good. · Limit alcohol to 2 drinks a day for men and 1 drink a day for women. Too much alcohol can cause health problems. If you have a BMI higher than 25  · Your doctor may do other tests to check your risk for weight-related health problems. This may include measuring the distance around your waist. A waist measurement of more than 40 inches in men or 35 inches in women can increase the risk of weight-related health problems. · Talk with your doctor about steps you can take to stay healthy or improve your health. You may need to make lifestyle changes to lose weight and stay healthy, such as changing your diet and getting regular exercise. If you have a BMI lower than 18.5  · Your doctor may do other tests to check your risk for health problems. · Talk with your doctor about steps you can take to stay healthy or improve your health. You may need to make lifestyle changes to gain or maintain weight and stay healthy, such as getting more healthy foods in your diet and doing exercises to build muscle. Where can you learn more? Go to http://marylu-jennifer.info/. Enter S176 in the search box to learn more about \"Body Mass Index: Care Instructions. \"  Current as of: October 13, 2016  Content Version: 11.4  © 1913-0328 [employer]. Care instructions adapted under license by [employer] (which disclaims liability or warranty for this information). If you have questions about a medical condition or this instruction, always ask your healthcare professional. [address] any warranty or liability for your use of this information.

## 2017-12-09 LAB
ALBUMIN SERPL-MCNC: 4.2 G/DL (ref 3.5–4.7)
ALBUMIN/GLOB SERPL: 1.4 {RATIO} (ref 1.2–2.2)
ALP SERPL-CCNC: 53 IU/L (ref 39–117)
ALT SERPL-CCNC: 13 IU/L (ref 0–44)
AST SERPL-CCNC: 17 IU/L (ref 0–40)
BILIRUB SERPL-MCNC: 0.7 MG/DL (ref 0–1.2)
BUN SERPL-MCNC: 11 MG/DL (ref 8–27)
BUN/CREAT SERPL: 13 (ref 10–24)
CALCIUM SERPL-MCNC: 9.1 MG/DL (ref 8.6–10.2)
CHLORIDE SERPL-SCNC: 95 MMOL/L (ref 96–106)
CHOLEST SERPL-MCNC: 126 MG/DL (ref 100–199)
CO2 SERPL-SCNC: 29 MMOL/L (ref 18–29)
CREAT SERPL-MCNC: 0.82 MG/DL (ref 0.76–1.27)
EST. AVERAGE GLUCOSE BLD GHB EST-MCNC: 123 MG/DL
GFR SERPLBLD CREATININE-BSD FMLA CKD-EPI: 78 ML/MIN/1.73
GFR SERPLBLD CREATININE-BSD FMLA CKD-EPI: 91 ML/MIN/1.73
GLOBULIN SER CALC-MCNC: 3.1 G/DL (ref 1.5–4.5)
GLUCOSE SERPL-MCNC: 98 MG/DL (ref 65–99)
HBA1C MFR BLD: 5.9 % (ref 4.8–5.6)
HDLC SERPL-MCNC: 47 MG/DL
HGB BLD-MCNC: 15.9 G/DL (ref 13–17.7)
LDLC SERPL CALC-MCNC: 68 MG/DL (ref 0–99)
POTASSIUM SERPL-SCNC: 4.1 MMOL/L (ref 3.5–5.2)
PROT SERPL-MCNC: 7.3 G/DL (ref 6–8.5)
SODIUM SERPL-SCNC: 140 MMOL/L (ref 134–144)
TRIGL SERPL-MCNC: 56 MG/DL (ref 0–149)
VLDLC SERPL CALC-MCNC: 11 MG/DL (ref 5–40)

## 2018-01-30 ENCOUNTER — HOSPITAL ENCOUNTER (OUTPATIENT)
Dept: NUCLEAR MEDICINE | Age: 83
Discharge: HOME | End: 2018-01-30
Attending: UROLOGY
Payer: MEDICARE

## 2018-01-30 DIAGNOSIS — R97.20 ELEVATED PROSTATE SPECIFIC ANTIGEN (PSA): ICD-10-CM

## 2018-01-30 DIAGNOSIS — C61 PROSTATE CANCER (HCC): ICD-10-CM

## 2018-01-30 PROCEDURE — 78306 BONE IMAGING WHOLE BODY: CPT

## 2018-03-23 ENCOUNTER — OFFICE VISIT (OUTPATIENT)
Dept: FAMILY MEDICINE CLINIC | Age: 83
End: 2018-03-23

## 2018-03-23 VITALS
OXYGEN SATURATION: 100 % | WEIGHT: 176 LBS | TEMPERATURE: 97.6 F | HEART RATE: 68 BPM | RESPIRATION RATE: 16 BRPM | DIASTOLIC BLOOD PRESSURE: 75 MMHG | HEIGHT: 69 IN | BODY MASS INDEX: 26.07 KG/M2 | SYSTOLIC BLOOD PRESSURE: 154 MMHG

## 2018-03-23 DIAGNOSIS — Z13.31 SCREENING FOR DEPRESSION: ICD-10-CM

## 2018-03-23 DIAGNOSIS — Z00.00 MEDICARE ANNUAL WELLNESS VISIT, SUBSEQUENT: Primary | ICD-10-CM

## 2018-03-23 DIAGNOSIS — E78.00 PURE HYPERCHOLESTEROLEMIA: Chronic | ICD-10-CM

## 2018-03-23 DIAGNOSIS — Z13.39 SCREENING FOR ALCOHOLISM: ICD-10-CM

## 2018-03-23 NOTE — PATIENT INSTRUCTIONS

## 2018-03-23 NOTE — PROGRESS NOTES
[address], [address] [place] [address]             Date of visit: 3/23/2018       This is a Subsequent Medicare Annual Wellness Visit (AWV), (Performed more than 12 months after effective date of Medicare Part B enrollment and 12 months after last wellness visit)    I have reviewed the patient's medical history in detail and updated the computerized patient record. Mago Parra is a 80 y.o. male   History obtained from: the patient. Concerns today   (Patient understands that medical problems addressed today may incur additional notes andcost as this is a preventive visit)      History     Patient Active Problem List   Diagnosis Code    Hypertension I10    Erythrocytosis D75.1    Erectile dysfunction N52.9    Colon polyp K63.5    Hyperkeratosis L85.9    Hyperlipidemia E78.5    Prostate cancer ([address] C61    Diabetes ([facility] E11.9    Type 2 diabetes mellitus with diabetic neuropathy ([facility] [address] [250.60, 357.2] E11.40    Lower GI bleed K92.2     Past Medical History:   Diagnosis Date    Allergic rhinitis     Benign prostatic hypertrophy     Erectile dysfunction     Erythrocytosis     Hypertension     Polyp, colon       Past Surgical History:   Procedure Laterality Date    COLONOSCOPY N/A 4/10/2017    COLONOSCOPY performed by Jeane Degroot MD at [address]  4/10/2017          No Known Allergies  Current Outpatient Prescriptions   Medication Sig Dispense Refill    enalapril (VASOTEC) 10 mg tablet TAKE ONE TABLET BY MOUTH ONCE DAILY 90 Tab 3    triamcinolone acetonide (KENALOG) 0.1 % topical cream Apply  to affected area two (2) times a day. use thin layer 85.2 g 4    hydroCHLOROthiazide (HYDRODIURIL) 25 mg tablet TAKE ONE TABLET BY MOUTH ONCE DAILY (GENERIC  FOR  HYDRODIURIL) 90 Tab 3    simvastatin (ZOCOR) 20 mg tablet Take 1 Tab by mouth nightly.  90 Tab 3    lancets (TRUEPLUS LANCETS) 33 gauge misc Use as directed to check blood sugar once daily. Dx:E11.9 Please dispense appropriate product 100 Lancet 4    glucose blood VI test strips (TRUETEST TEST STRIPS) strip Use as directed to check blood sugar once daily. Dx:E11.9 50 Strip 5    Blood-Glucose Meter (TRUE METRIX GLUCOSE METER) misc Use as directed to check blood sugar once daily. Dx:E11.9 Please dispense appropriate product 1 Each 0    glucose blood VI test strips (TRUE METRIX GLUCOSE TEST STRIP) strip Use as directed to check blood sugar once daily. Dx:E11.9 Please dispense appropriate product 50 Strip 5    acetaminophen (TYLENOL) 650 mg CR tablet Take 650 mg by mouth nightly.  tamsulosin (FLOMAX) 0.4 mg capsule Take 0.4 mg by mouth daily (after dinner).  finasteride (PROSCAR) 5 mg tablet Take 5 mg by mouth daily (after dinner).  cholecalciferol (VITAMIN D-3) 400 unit Tab tablet Take 400 Units by mouth daily.  aspirin 81 mg Tab Take 81 mg by mouth daily.  metFORMIN (GLUCOPHAGE) 500 mg tablet TAKE ONE-HALF TABLET BY MOUTH TWICE DAILY WITH MEALS 80 Tab 0     Family History   Problem Relation Age of Onset    Cancer Mother     Cancer Brother      lung    Heart Disease Brother      Social History   Substance Use Topics    Smoking status: Former Smoker     Packs/day: 1.00     Years: 15.00    Smokeless tobacco: Never Used    Alcohol use No       Specialists/Care Team   [employer]iguel Huey Gianni has established care with the following healthcare providers:  Patient Care Team:  Sandeep Jenkins MD as PCP - [place] [address]     Demographics   male  80 y.o.     General Health Questions   -During the past 4 weeks:   -how would you rate your health in general? Good   -how often have you been bothered by feeling dizzy when standing up? never   -how much have you been bothered by bodily pain? mildly   -Have you noticed any hearing difficulties? no   -has your physical and emotional health limited your social activities with family or friends? no    Emotional Health Questions   -Do you have a history of depression, anxiety, or emotional problems? no  -Over the past 2 weeks, have you felt down, depressed or hopeless? no  -Over the past 2 weeks, have you felt little interest or pleasure in doing things? no    Health Habits   Please describe your diet habits: Self grade B  Do you get 5 servings of fruits or vegetables daily? yes  Do you exercise regularly? yes    Alcohol Risk Factor Screening: On any occasion during the past 3 months, have you had more than 4 drinks containing alcohol? No   Do you average more than 14 drinks per week? No     Activities of Daily Living and Functional Status   -Do you need help with eating, walking, dressing, bathing, toileting, the phone, transportation, shopping, preparing meals, housework, laundry, medications or managing money? no  -In the past four weeks, was someone available to help you if you needed and wanted help with anything? yes  -Are you confident are you that you can control and manage most of your health problems? yes  -Have you been given information to help you keep track of your medications? yes  -How often do you have trouble taking your medications as prescribed? never    Fall Risk and Home Safety   Have you fallen 2 or more times in the past year? no  Does your home have rugs in the hallway, lack grab bars in the bathroom, lack handrails on the stairs or have poor lighting? no  Do you have smoke detectors and check them regularly? yes  Do you have difficulties driving a car? no  Do you always fasten your seat belt when you are in a car? yes    Review of Systems (if indicated for problems addressed today)   Review of Systems   Constitutional: Negative. Negative for chills, fever, malaise/fatigue and weight loss. HENT: Negative. Negative for hearing loss. Eyes: Negative. Negative for blurred vision and double vision. Respiratory: Negative.   Negative for cough, hemoptysis, sputum production and shortness of breath. Cardiovascular: Negative. Negative for chest pain, palpitations and orthopnea. Gastrointestinal: Negative. Negative for abdominal pain, blood in stool, heartburn, nausea and vomiting. Genitourinary: Negative. Negative for dysuria, frequency and urgency. Musculoskeletal: Negative. Negative for back pain, myalgias and neck pain. Skin: Negative. Negative for rash. Neurological: Negative. Negative for dizziness, tingling, tremors, weakness and headaches. Endo/Heme/Allergies: Negative. Psychiatric/Behavioral: Negative. Negative for depression. Physical Examination     Wt Readings from Last 3 Encounters:   03/23/18 176 lb (79.8 kg)   12/08/17 174 lb 3.2 oz (79 kg)   08/07/17 168 lb 6.4 oz (76.4 kg)     Temp Readings from Last 3 Encounters:   03/23/18 97.6 °F (36.4 °C) (Oral)   12/08/17 97.3 °F (36.3 °C) (Oral)   09/20/17 98 °F (36.7 °C) (Oral)     BP Readings from Last 3 Encounters:   03/23/18 154/75   12/08/17 122/69   08/07/17 112/68     Pulse Readings from Last 3 Encounters:   03/23/18 68   12/08/17 77   08/07/17 72       Body mass index is 25.99 kg/(m^2). No exam data present  Was the patient's timed Up & Go test unsteady or longer than 10 seconds? no    Evaluation of Cognitive Function   Mood/affect:  happy  Orientation: Person, Place, Time and Situation  Appearance: age appropriate  Family member/caregiver input: no    Additional exam if indicated for problems addressed today:  Physical Exam   Constitutional: He is oriented to person, place, and time. He appears well-developed and well-nourished. No distress. HENT:   Head: Normocephalic and atraumatic. Mouth/Throat: Oropharynx is clear and moist.   Eyes: Conjunctivae are normal. Right eye exhibits no discharge. Left eye exhibits no discharge. No scleral icterus. Neck: No thyromegaly present. No carotid bruit. Cardiovascular: Normal rate, regular rhythm and normal heart sounds.   Exam reveals no gallop and no friction rub. No murmur heard. Pulmonary/Chest: Effort normal and breath sounds normal. No respiratory distress. He has no wheezes. He has no rales. He exhibits no tenderness. Abdominal: Soft. Bowel sounds are normal. He exhibits no distension and no mass. There is no tenderness. There is no guarding. Musculoskeletal: He exhibits no edema or tenderness. Lymphadenopathy:     He has no cervical adenopathy. Neurological: He is alert and oriented to person, place, and time. No cranial nerve deficit. Skin: Skin is warm and dry. No rash noted. He is not diaphoretic. No erythema. No pallor. Psychiatric: He has a normal mood and affect. His behavior is normal.         Advice/Referrals/Counseling (as indicated)   Education and counseling provided for any problems identified above:     Preventive Services     (Preventive care checklist to be included in patient instructions)  Discussed today Done Previously Not Needed     x  Pneumococcal vaccines    x  Flu vaccine     x Hepatitis B vaccine (if at risk)   X-no due to insurance   Shingles vaccine   X- no due to insurance   TDAP vaccine     x Digital rectal exam     x PSA     x Colorectal cancer screening     x Low-dose CT for lung cancer screening     x Bone density test    x  Glaucoma screening    x  Cholesterol test    x  Diabetes screening test    x   Diabetes self-management class   x   Nutritionist referral for diabetes or renal disease     Discussion of Advance Directive   Discussed with Jennifer Greenwood. Dedrick Echevarria his ability to prepare and advance directive in the case that an injury or illness causes him to be unable to make health care decisions. Assessment/Plan   Z00.00    ICD-10-CM ICD-9-CM    1. Medicare annual wellness visit, subsequent Z00.00 V70.0    2. Screening for depression Z13.89 V79.0    3. Screening for alcoholism Z13.89 V79.1    4.  Pure hypercholesterolemia E78.00 272.0        No orders of the defined types were placed in this encounter. Patient Care Team:  Shania Dumont MD as PCP - General    Follow-up Disposition: Not on File    No future appointments.     Shania Dumont MD

## 2018-03-23 NOTE — MR AVS SNAPSHOT
[address] [place] [address] 
[phone] Patient: John Aguillon MRN: [id number] Visit Information Date & Time Provider Department Dept. Phone Encounter #  
 3/23/2018  1:25 PM Nupur Armendariz  [facility] [phone] [id number] Follow-up Instructions Return for has appt. Clotilde Whitman Upcoming Health Maintenance Date Due  
 MEDICARE YEARLY EXAM 3/21/2018 HEMOGLOBIN A1C Q6M 2018 MICROALBUMIN Q1 2018 EYE EXAM RETINAL OR DILATED Q1 2018 FOOT EXAM Q1 2018 LIPID PANEL Q1 2018 GLAUCOMA SCREENING Q2Y 2019 DTaP/Tdap/Td series (2 - Td) 2027 Allergies as of 3/23/2018  Review Complete On: 3/23/2018 By: Nupur Armendariz MD  
 No Known Allergies Current Immunizations  Reviewed on 2016 Name Date Influenza High Dose Vaccine PF 2017 Influenza Vaccine 10/27/2015, 2014, 2013 Influenza Vaccine (Quad) PF 10/5/2016 Influenza Vaccine Split 2012, 10/14/2011, 10/11/2010 Influenza Vaccine Whole 10/11/2010, 2009 Pneumococcal Conjugate (PCV-13) 2015 TD Vaccine 2005 ZZZ-RETIRED (DO NOT USE) Pneumococcal Vaccine (Unspecified Type) 2010, 1997 Not reviewed this visit You Were Diagnosed With   
  
 Codes Comments Medicare annual wellness visit, subsequent    -  Primary ICD-10-CM: Z00.00 ICD-9-CM: V70.0 Screening for depression     ICD-10-CM: Z13.89 ICD-9-CM: V79.0 Screening for alcoholism     ICD-10-CM: Z13.89 ICD-9-CM: V79.1 Pure hypercholesterolemia     ICD-10-CM: E78.00 ICD-9-CM: 272.0 Vitals BP Pulse Temp Resp Height(growth percentile) Weight(growth percentile) 154/75 (BP 1 Location: Left arm, BP Patient Position: Sitting) 68 97.6 °F (36.4 °C) (Oral) 16 5' 9\" (1.753 m) 176 lb (79.8 kg) SpO2 BMI Smoking Status 100% 25.99 kg/m2 Former Smoker Vitals History BMI and BSA Data Body Mass Index Body Surface Area  
 25.99 kg/m 2 1.97 m 2 Preferred Pharmacy Pharmacy Name Phone [address] [place] [address] [phone] Your Updated Medication List  
  
   
This list is accurate as of 3/23/18  1:48 PM.  Always use your most recent med list.  
  
  
  
  
 acetaminophen 650 mg Tber Commonly known as:  TYLENOL Take 650 mg by mouth nightly. aspirin 81 mg tablet Take 81 mg by mouth daily. Blood-Glucose Meter Misc Commonly known as:  TRUE METRIX GLUCOSE METER Use as directed to check blood sugar once daily. Dx:E11.9 Please dispense appropriate product  
  
 enalapril 10 mg tablet Commonly known as:  VASOTEC  
TAKE ONE TABLET BY MOUTH ONCE DAILY  
  
 finasteride 5 mg tablet Commonly known as:  PROSCAR Take 5 mg by mouth daily (after dinner). * glucose blood VI test strips strip Commonly known as:  TRUETEST TEST STRIPS Use as directed to check blood sugar once daily. Dx:E11.9  
  
 * glucose blood VI test strips strip Commonly known as:  TRUE METRIX GLUCOSE TEST STRIP Use as directed to check blood sugar once daily. Dx:E11.9 Please dispense appropriate product  
  
 hydroCHLOROthiazide 25 mg tablet Commonly known as:  HYDRODIURIL  
TAKE ONE TABLET BY MOUTH ONCE DAILY (GENERIC  FOR  HYDRODIURIL)  
  
 lancets 33 gauge Misc Commonly known as:  Renella Livings Use as directed to check blood sugar once daily. Dx:E11.9 Please dispense appropriate product  
  
 metFORMIN 500 mg tablet Commonly known as:  GLUCOPHAGE  
TAKE ONE-HALF TABLET BY MOUTH TWICE DAILY WITH MEALS  
  
 simvastatin 20 mg tablet Commonly known as:  ZOCOR Take 1 Tab by mouth nightly. tamsulosin 0.4 mg capsule Commonly known as:  FLOMAX Take 0.4 mg by mouth daily (after dinner).   
  
 triamcinolone acetonide 0.1 % topical cream  
 Commonly known as:  KENALOG Apply  to affected area two (2) times a day. use thin layer VITAMIN D3 400 unit Tab tablet Generic drug:  cholecalciferol Take 400 Units by mouth daily. * Notice: This list has 2 medication(s) that are the same as other medications prescribed for you. Read the directions carefully, and ask your doctor or other care provider to review them with you. Follow-up Instructions Return for has appt. Melissa Clement Patient Instructions Well Visit, Over 72: Care Instructions Your Care Instructions Physical exams can help you stay healthy. Your doctor has checked your overall health and may have suggested ways to take good care of yourself. He or she also may have recommended tests. At home, you can help prevent illness with healthy eating, regular exercise, and other steps. Follow-up care is a key part of your treatment and safety. Be sure to make and go to all appointments, and call your doctor if you are having problems. It's also a good idea to know your test results and keep a list of the medicines you take. How can you care for yourself at home? · Reach and stay at a healthy weight. This will lower your risk for many problems, such as obesity, diabetes, heart disease, and high blood pressure. · Get at least 30 minutes of exercise on most days of the week. Walking is a good choice. You also may want to do other activities, such as running, swimming, cycling, or playing tennis or team sports. · Do not smoke. Smoking can make health problems worse. If you need help quitting, talk to your doctor about stop-smoking programs and medicines. These can increase your chances of quitting for good. · Protect your skin from too much sun. When you're outdoors from 10 a.m. to 4 p.m., stay in the shade or cover up with clothing and a hat with a wide brim. Wear sunglasses that block UV rays.  Even when it's cloudy, put broad-spectrum sunscreen (SPF 30 or higher) on any exposed skin. · See a dentist one or two times a year for checkups and to have your teeth cleaned. · Wear a seat belt in the car. · Limit alcohol to 2 drinks a day for men and 1 drink a day for women. Too much alcohol can cause health problems. Follow your doctor's advice about when to have certain tests. These tests can spot problems early. For men and women · Cholesterol. Your doctor will tell you how often to have this done based on your overall health and other things that can increase your risk for heart attack and stroke. · Blood pressure. Have your blood pressure checked during a routine doctor visit. Your doctor will tell you how often to check your blood pressure based on your age, your blood pressure results, and other factors. · Diabetes. Ask your doctor whether you should have tests for diabetes. · Vision. Experts recommend that you have yearly exams for glaucoma and other age-related eye problems. · Hearing. Tell your doctor if you notice any change in your hearing. You can have tests to find out how well you hear. · Colon cancer tests. Keep having colon cancer tests as your doctor recommends. You can have one of several types of tests. · Heart attack and stroke risk. At least every 4 to 6 years, you should have your risk for heart attack and stroke assessed. Your doctor uses factors such as your age, blood pressure, cholesterol, and whether you smoke or have diabetes to show what your risk for a heart attack or stroke is over the next 10 years. · Osteoporosis. Talk to your doctor about whether you should have a bone density test to find out whether you have thinning bones. Also ask your doctor about whether you should take calcium and vitamin D supplements. For women · Pap test and pelvic exam. You may no longer need a Pap test. Talk with your doctor about whether to stop or continue to have Pap tests. · Breast exam and mammogram. Ask how often you should have a mammogram, which is an X-ray of your breasts. A mammogram can spot breast cancer before it can be felt and when it is easiest to treat. · Thyroid disease. Talk to your doctor about whether to have your thyroid checked as part of a regular physical exam. Women have an increased chance of a thyroid problem. For men · Prostate exam. Talk to your doctor about whether you should have a blood test (called a PSA test) for prostate cancer. Experts disagree on whether men should have this test. Some experts recommend that you discuss the benefits and risks of the test with your doctor. · Abdominal aortic aneurysm. Ask your doctor whether you should have a test to check for an aneurysm. You may need a test if you ever smoked or if your parent, brother, sister, or child has had an aneurysm. When should you call for help? Watch closely for changes in your health, and be sure to contact your doctor if you have any problems or symptoms that concern you. Where can you learn more? Go to http://marylu-jennifer.info/. Enter U790 in the search box to learn more about \"Well Visit, Over 65: Care Instructions. \" Current as of: May 12, 2017 Content Version: 11.4 © 8925-3348 [employer]wise[employer]orporated. Care instructions adapted under license by [employer] (which disclaims liability or warranty for this information). If you have questions about a medical condition or this instruction, always ask your healthcare professional. [place] [address] any warranty or liability for your use of this information. Introducing [facility]! Stanley Conrad introduces [employer] patient portal. Now you can access parts of your medical record, email your doctor's office, and request medication refills online. 1. In your internet browser, go to [email or web address] 2. Click on the First Time User? Click Here link in the Sign In box. You will see the New Member Sign Up page. 3. Enter your [employer] Access Code exactly as it appears below. You will not need to use this code after youve completed the sign-up process. If you do not sign up before the expiration date, you must request a new code. · [employer] Access Code: [email or web address] Expires: 6/21/2018  1:48 PM 
 
4. Enter the last four digits of your Social Security Number (xxxx) and Date of Birth (mm/dd/yyyy) as indicated and click Submit. You will be taken to the next sign-up page. 5. Create a [employer] ID. This will be your [employer] login ID and cannot be changed, so think of one that is secure and easy to remember. 6. Create a [employer] password. You can change your password at any time. 7. Enter your Password Reset Question and Answer. This can be used at a later time if you forget your password. 8. Enter your e-mail address. You will receive e-mail notification when new information is available in [address] 9. Click Sign Up. You can now view and download portions of your medical record. 10. Click the Download Summary menu link to download a portable copy of your medical information. If you have questions, please visit the Frequently Asked Questions section of the [employer] website. Remember, [employer] is NOT to be used for urgent needs. For medical emergencies, dial 911. Now available from your iPhone and Android! Please provide this summary of care documentation to your next provider. Your primary care clinician is listed as [address]. If you have any questions after today's visit, please call [phone].

## 2018-03-23 NOTE — PROGRESS NOTES
Chief Complaint   Patient presents with    Annual Wellness Visit     Body mass index is 25.99 kg/(m^2). 1. Have you been to the ER, urgent care clinic since your last visit? Hospitalized since your last visit? No    2. Have you seen or consulted any other health care providers outside of the [address] [place] [address] since your last visit? Include any pap smears or colon screening.  No    Reviewed record in preparation for visit and have necessary documentation  Pt did not bring medication to office visit for review  Information was given to pt on Advanced Directives, Living Will  Information was given on Shingles Vaccine  Opportunity was given for questions  Goals that were addressed and/or need to be completed after this appointment include:     Health Maintenance Due   Topic Date Due    MEDICARE YEARLY EXAM  03/21/2018

## 2018-07-16 DIAGNOSIS — E78.00 PURE HYPERCHOLESTEROLEMIA: Chronic | ICD-10-CM

## 2018-07-16 RX ORDER — SIMVASTATIN 20 MG/1
20 TABLET, FILM COATED ORAL
Qty: 90 TAB | Refills: 3 | Status: SHIPPED | OUTPATIENT
Start: 2018-07-16 | End: 2018-10-12 | Stop reason: SDUPTHER

## 2018-10-03 ENCOUNTER — CLINICAL SUPPORT (OUTPATIENT)
Dept: FAMILY MEDICINE CLINIC | Age: 83
End: 2018-10-03

## 2018-10-03 VITALS — TEMPERATURE: 98.8 F

## 2018-10-03 DIAGNOSIS — Z23 ENCOUNTER FOR IMMUNIZATION: Primary | ICD-10-CM

## 2018-10-12 DIAGNOSIS — E78.00 PURE HYPERCHOLESTEROLEMIA: Chronic | ICD-10-CM

## 2018-10-12 RX ORDER — SIMVASTATIN 20 MG/1
20 TABLET, FILM COATED ORAL
Qty: 90 TAB | Refills: 3 | Status: SHIPPED | OUTPATIENT
Start: 2018-10-12 | End: 2019-01-11 | Stop reason: SDUPTHER

## 2018-10-24 ENCOUNTER — OFFICE VISIT (OUTPATIENT)
Dept: FAMILY MEDICINE CLINIC | Age: 83
End: 2018-10-24

## 2018-10-24 VITALS
RESPIRATION RATE: 18 BRPM | WEIGHT: 169.4 LBS | BODY MASS INDEX: 25.09 KG/M2 | OXYGEN SATURATION: 99 % | DIASTOLIC BLOOD PRESSURE: 66 MMHG | SYSTOLIC BLOOD PRESSURE: 118 MMHG | TEMPERATURE: 97.4 F | HEART RATE: 66 BPM | HEIGHT: 69 IN

## 2018-10-24 DIAGNOSIS — C61 PROSTATE CANCER (HCC): Chronic | ICD-10-CM

## 2018-10-24 DIAGNOSIS — K92.2 LOWER GI BLEED: ICD-10-CM

## 2018-10-24 DIAGNOSIS — Z23 ENCOUNTER FOR IMMUNIZATION: ICD-10-CM

## 2018-10-24 DIAGNOSIS — D75.1 ERYTHROCYTOSIS: Chronic | ICD-10-CM

## 2018-10-24 DIAGNOSIS — I10 ESSENTIAL HYPERTENSION: Chronic | ICD-10-CM

## 2018-10-24 DIAGNOSIS — E11.40 TYPE 2 DIABETES MELLITUS WITH DIABETIC NEUROPATHY, WITHOUT LONG-TERM CURRENT USE OF INSULIN (HCC): Primary | Chronic | ICD-10-CM

## 2018-10-24 DIAGNOSIS — E78.00 PURE HYPERCHOLESTEROLEMIA: Chronic | ICD-10-CM

## 2018-10-24 NOTE — PROGRESS NOTES
[address] Residency Program, 1200 [facility] Affiliated with [address] [place] [address] Progress Note Patient: Damir Perry MRN: [id number]  SSN: [id number] YOB: 1927  Age: 80 y.o. Sex: male Chief Complaint Patient presents with  Labs Fasting  Hypertension Subjective:  
 
Encounter Diagnoses Name Primary?  Type 2 diabetes mellitus with diabetic neuropathy, without long-term current use of insulin ([facility] [address] [facility]): This patient is managed under a comprehensive plan of care for Diabetes. Overall the patient feels well with good energy level. Key Antihyperglycemic Medications   
    
  
 metFORMIN (GLUCOPHAGE) 500 mg tablet TAKE ONE-HALF TABLET BY MOUTH TWICE DAILY WITH MEALS Pertinent Labs:  
Lab Results Component Value Date/Time Hemoglobin A1c 5.9 (H) 12/08/2017 11:53 AM  
 Hemoglobin A1c 6.0 (H) 08/07/2017 10:40 AM  
 Hemoglobin A1c 5.8 (H) 01/04/2017 08:24 AM  
 
 Body mass index is 25.02 kg/m². Lab Results Component Value Date/Time LDL, calculated 68 12/08/2017 11:53 AM  
     
Lab Results Component Value Date/Time Sodium 140 12/08/2017 11:53 AM  
 Potassium 4.1 12/08/2017 11:53 AM  
 Chloride 95 (L) 12/08/2017 11:53 AM  
 CO2 29 12/08/2017 11:53 AM  
 Anion gap 8 04/10/2017 06:58 AM  
 Glucose 98 12/08/2017 11:53 AM  
 BUN 11 12/08/2017 11:53 AM  
 Creatinine 0.82 12/08/2017 11:53 AM  
 BUN/Creatinine ratio 13 12/08/2017 11:53 AM  
 GFR est AA 91 12/08/2017 11:53 AM  
 GFR est non-AA 78 12/08/2017 11:53 AM  
 Calcium 9.1 12/08/2017 11:53 AM  
 AST (SGOT) 17 12/08/2017 11:53 AM  
 Alk. phosphatase 53 12/08/2017 11:53 AM  
 Protein, total 7.3 12/08/2017 11:53 AM  
 Albumin 4.2 12/08/2017 11:53 AM  
 Globulin 3.8 04/10/2017 06:58 AM  
 A-G Ratio 1.4 12/08/2017 11:53 AM  
 ALT (SGPT) 13 12/08/2017 11:53 AM  
 
Lab Results Component Value Date/Time Microalbumin/Creat ratio (mg/g creat) 5 07/15/2010 08:52 AM  
 Microalb/Creat ratio (ug/mg creat.) 3.4 08/07/2017 10:40 AM  
 Microalbumin,urine random 0.77 07/15/2010 08:52 AM  
 
 Frequency of home glucose testing: No logs Blood Sugar range at home:  
 Last eye exam: In past 12 months. Last foot exam: This year. Polyuria, polyphagia or polydipsia: No 
 Retinopathy: No 
 Neuropathy SX: Previously had mild neuropathy symptoms Low blood sugar symptoms: No 
 Dietary compliance: Good Medication compliance:Good On ASA: Yes Depression: No 
 CKD:no Wt Readings from Last 3 Encounters:  
10/24/18 169 lb 6.4 oz (76.8 kg) 03/23/18 176 lb (79.8 kg) 12/08/17 174 lb 3.2 oz (79 kg) Social History Tobacco Use Smoking Status Former Smoker  Packs/day: 1.00  Years: 15.00  Pack years: 15.00 Smokeless Tobacco Never Used Body mass index is 25.02 kg/m². Diabetic Consultants: All the patient's questions regarding medications, diet and exercise were answered. Goal of A1C of less than 7.5% is our goal.  
Our overall goal is to reduce or eliminate the long term consequences of poorly controlled diabetes. Yes  Prostate cancer [facility]): He has no symptoms of recurrence. He is still followed by Dr. Janes Bush.  Essential hypertension: 
BP Readings from Last 3 Encounters:  
10/24/18 118/66  
03/23/18 154/75  
12/08/17 122/69 The patient reports:  taking medications as instructed, no medication side effects noted, no TIA's, no chest pain on exertion, no dyspnea on exertion, no swelling of ankles. Key CAD CHF Meds   
    
  
 simvastatin (ZOCOR) 20 mg tablet  (Taking) Take 1 Tab by mouth nightly. enalapril (VASOTEC) 10 mg tablet  (Taking) TAKE ONE TABLET BY MOUTH ONCE DAILY  
 hydroCHLOROthiazide (HYDRODIURIL) 25 mg tablet  (Taking) TAKE ONE TABLET BY MOUTH ONCE DAILY (GENERIC  FOR  HYDRODIURIL) aspirin 81 mg Tab  (Taking) Take 81 mg by mouth daily. Lab Results Component Value Date/Time Sodium 140 12/08/2017 11:53 AM  
 Potassium 4.1 12/08/2017 11:53 AM  
 Chloride 95 (L) 12/08/2017 11:53 AM  
 CO2 29 12/08/2017 11:53 AM  
 Anion gap 8 04/10/2017 06:58 AM  
 Glucose 98 12/08/2017 11:53 AM  
 BUN 11 12/08/2017 11:53 AM  
 Creatinine 0.82 12/08/2017 11:53 AM  
 BUN/Creatinine ratio 13 12/08/2017 11:53 AM  
 GFR est AA 91 12/08/2017 11:53 AM  
 GFR est non-AA 78 12/08/2017 11:53 AM  
 Calcium 9.1 12/08/2017 11:53 AM  
 Bilirubin, total 0.7 12/08/2017 11:53 AM  
 AST (SGOT) 17 12/08/2017 11:53 AM  
 Alk. phosphatase 53 12/08/2017 11:53 AM  
 Protein, total 7.3 12/08/2017 11:53 AM  
 Albumin 4.2 12/08/2017 11:53 AM  
 Globulin 3.8 04/10/2017 06:58 AM  
 A-G Ratio 1.4 12/08/2017 11:53 AM  
 ALT (SGPT) 13 12/08/2017 11:53 AM  
 
Low salt diet? yes Aerobic exercise? He walks about 3/8 of a mile daily and [place] Our goal is to normalize the blood pressure to decrease the risks of strokes and heart attacks. The patient is in agreement with the plan.  Erythrocytosis he sees . No change reported.  Pure hypercholesterolemia: 
Cardiovascular risks for him are: LDL goal is under 80 
diabetic 
hypertension 
hyperlipidemia. Key Antihyperlipidemia Meds   
    
  
 simvastatin (ZOCOR) 20 mg tablet  (Taking) Take 1 Tab by mouth nightly. Lab Results Component Value Date/Time Cholesterol, total 126 12/08/2017 11:53 AM  
 HDL Cholesterol 47 12/08/2017 11:53 AM  
 LDL, calculated 68 12/08/2017 11:53 AM  
 Triglyceride 56 12/08/2017 11:53 AM  
 CHOL/HDL Ratio 3.1 07/15/2010 08:52 AM  
 
Lab Results Component Value Date/Time ALT (SGPT) 13 12/08/2017 11:53 AM  
 AST (SGOT) 17 12/08/2017 11:53 AM  
 Alk. phosphatase 53 12/08/2017 11:53 AM  
 Bilirubin, total 0.7 12/08/2017 11:53 AM  
 
 Myalgias: No 
 Fatigue: No 
 Other side effects: no Wt Readings from Last 3 Encounters:  
10/24/18 169 lb 6.4 oz (76.8 kg) 03/23/18 176 lb (79.8 kg) 12/08/17 174 lb 3.2 oz (79 kg) The patient is aware of our goal to reduce or eliminate the long term problems (such as strokes and heart attacks) related to poorly controlled hyperlipidemia.  Lower GI bleed: Related to over use of aspirin. No recurrence of symptoms since original GI bleed.  Encounter for immunization: Discussed the advantages of Shingrix. Prescription given. Current and past medical information: 
 
Current Medications after this visit[de-identified]    
Current Outpatient Medications Medication Sig  varicella-zoster gE vac,2 of 2 50 mcg susr Shingrix, one injection now and repeat in 4-6 months. To be administered at Pharmacy. Fax confirmation to me at [phone].  simvastatin (ZOCOR) 20 mg tablet Take 1 Tab by mouth nightly.  enalapril (VASOTEC) 10 mg tablet TAKE ONE TABLET BY MOUTH ONCE DAILY  triamcinolone acetonide (KENALOG) 0.1 % topical cream Apply  to affected area two (2) times a day. use thin layer  hydroCHLOROthiazide (HYDRODIURIL) 25 mg tablet TAKE ONE TABLET BY MOUTH ONCE DAILY (GENERIC  FOR  HYDRODIURIL)  lancets (TRUEPLUS LANCETS) 33 gauge misc Use as directed to check blood sugar once daily. Dx:E11.9 Please dispense appropriate product  glucose blood VI test strips (TRUETEST TEST STRIPS) strip Use as directed to check blood sugar once daily. Dx:E11.9  Blood-Glucose Meter (TRUE METRIX GLUCOSE METER) misc Use as directed to check blood sugar once daily. Dx:E11.9 Please dispense appropriate product  glucose blood VI test strips (TRUE METRIX GLUCOSE TEST STRIP) strip Use as directed to check blood sugar once daily. Dx:E11.9 Please dispense appropriate product  acetaminophen (TYLENOL) 650 mg CR tablet Take 650 mg by mouth nightly.  tamsulosin (FLOMAX) 0.4 mg capsule Take 0.4 mg by mouth daily (after dinner).  finasteride (PROSCAR) 5 mg tablet Take 5 mg by mouth daily (after dinner).  cholecalciferol (VITAMIN D-3) 400 unit Tab tablet Take 400 Units by mouth daily.  aspirin 81 mg Tab Take 81 mg by mouth daily.  metFORMIN (GLUCOPHAGE) 500 mg tablet TAKE ONE-HALF TABLET BY MOUTH TWICE DAILY WITH MEALS No current facility-administered medications for this visit. Patient Active Problem List  
 Diagnosis Date Noted  Type 2 diabetes mellitus with diabetic neuropathy ([facility] [250.60, 357.2] 08/05/2015 Priority: 1 - One  Prostate cancer ([facility] 01/27/2011 Priority: 1 - One  Colon polyp 11/16/2010 Priority: 1 - One  
 Hyperlipidemia 11/16/2010 Priority: 1 - One  
 Hypertension Priority: 1 - One  
 Erythrocytosis Priority: 1 - One  
 Erectile dysfunction Priority: 5 - Five  Lower GI bleed 08/07/2017  Hyperkeratosis 11/16/2010 Past Medical History:  
Diagnosis Date  Allergic rhinitis  Benign prostatic hypertrophy  Erectile dysfunction  Erythrocytosis  Hypertension  Polyp, colon No Known Allergies Past Surgical History:  
Procedure Laterality Date  COLONOSCOPY,REMV LESN,SNARE  4/10/2017 Social History Socioeconomic History  Marital status:  Spouse name: Not on file  Number of children: Not on file  Years of education: Not on file  Highest education level: Not on file Social Needs  Financial resource strain: Not on file  Food insecurity - worry: Not on file  Food insecurity - inability: Not on file  Transportation needs - medical: Not on file  Transportation needs - non-medical: Not on file Occupational History  Not on file Tobacco Use  Smoking status: Former Smoker Packs/day: 1.00 Years: 15.00 Pack years: 15.00  Smokeless tobacco: Never Used Substance and Sexual Activity  Alcohol use: No  
 Drug use: No  
 Sexual activity: Not on file Other Topics Concern  Not on file Social History Narrative  Not on file Review of Systems Constitutional: Negative. Negative for chills, fever, malaise/fatigue and weight loss. HENT: Negative. Negative for hearing loss. Eyes: Negative. Negative for blurred vision and double vision. Respiratory: Negative. Negative for cough, hemoptysis, sputum production and shortness of breath. Cardiovascular: Negative. Negative for chest pain, palpitations and orthopnea. Gastrointestinal: Negative. Negative for abdominal pain, blood in stool, heartburn, nausea and vomiting. Genitourinary: Negative. Negative for dysuria, frequency and urgency. Musculoskeletal: Negative. Negative for back pain, myalgias and neck pain. Skin: Negative. Negative for rash. Neurological: Negative. Negative for dizziness, tingling, tremors, weakness and headaches. Endo/Heme/Allergies: Negative. Psychiatric/Behavioral: Negative. Negative for depression. Objective:  
 
Vitals:  
 10/24/18 0900 BP: 118/66 Pulse: 66 Resp: 18 Temp: 97.4 °F (36.3 °C) TempSrc: Oral  
SpO2: 99% Weight: 169 lb 6.4 oz (76.8 kg) Height: 5' 9\" (1.753 m) Body mass index is 25.02 kg/m². Physical Exam  
Constitutional: He is oriented to person, place, and time and well-developed, well-nourished, and in no distress. HENT:  
Head: Normocephalic and atraumatic. Mouth/Throat: Oropharynx is clear and moist.  
Eyes: Right eye exhibits no discharge. Left eye exhibits no discharge. No scleral icterus. Neck: No thyromegaly present. No bruit. Cardiovascular: Normal rate, regular rhythm and normal heart sounds. Pulmonary/Chest: Effort normal and breath sounds normal.  
Abdominal: Soft. Neurological: He is alert and oriented to person, place, and time. Skin: No rash noted. No erythema. Psychiatric: Mood and affect normal.  
Nursing note and vitals reviewed. Health Maintenance Due Topic Date Due  
 HEMOGLOBIN A1C Q6M  06/08/2018  MICROALBUMIN Q1  08/07/2018 Assessment and orders:  
 
Encounter Diagnoses ICD-10-CM ICD-9-CM 1. Type 2 diabetes mellitus with diabetic neuropathy, without long-term current use of insulin (HCC) E11.40 250.60  
  357.2 2. [facility] ([facility] C61 185  
3. Essential hypertension I10 401.9 4. Erythrocytosis D75.1 289.0 5. Pure hypercholesterolemia E78.00 272.0 6. Lower GI bleed K92.2 578.9 7. Encounter for immunization Z23 V03.89 Diagnoses and all orders for this visit: 
 
1. Type 2 diabetes mellitus with diabetic neuropathy, without long-term current use of insulin (HCC) 
-     HEMOGLOBIN A1C WITH EAG 
-     MICROALBUMIN, UR, RAND W/ MICROALB/CREAT RATIO 
-     LIPID PANEL 
-     METABOLIC PANEL, COMPREHENSIVE 2. Prostate cancer (HCC)-no symptoms of recurrence 3. Essential hypertension-controlled Assessment & Plan: 
BP Readings from Last 3 Encounters:  
10/24/18 118/66  
03/23/18 154/75  
12/08/17 122/69 Orders: 
-     HEMOGLOBIN 
-     MICROALBUMIN, UR, RAND W/ MICROALB/CREAT RATIO 
-     LIPID PANEL 
-     METABOLIC PANEL, COMPREHENSIVE 4. Erythrocytosis-stable, sees oncology 
-     HEMOGLOBIN 
 
5. Pure hypercholesterolemia-retest 
-     LIPID PANEL 
-     METABOLIC PANEL, COMPREHENSIVE 6. Lower GI bleed-no recurrence. By history this was related to excessive aspirin use. -     HEMOGLOBIN 
 
7. Encounter for immunization 
-     varicella-zoster gE vac,2 of 2 50 mcg susr; Shingrix, one injection now and repeat in 4-6 months. To be administered at Pharmacy. Fax confirmation to me at [phone]. Plan of care: 
Discussed diagnoses in detail with patient. Medication risks/benefits/side effects discussed with patient. All of the patient's questions were addressed. The patient understands and agrees with our plan of care. The patient knows to call back if they are unsure of or forget any changes we discussed today or if the symptoms change. The patient received an After-Visit Summary which contains VS, orders, medication list and allergy list. This can be used as a \"mini-medical record\" should they have to seek medical care while out of town. Patient Care Team: 
Raciel Rothman MD as PCP - General 
 
Follow-up Disposition: 
Return in about 4 months (around 2/24/2019). Future Appointments Date Time Provider [facility] 2/26/2019  8:00 AM Raciel Rothman MD [facility] Signed By: Nathan Hargrove MD   
 October 24, 2018

## 2018-10-24 NOTE — ACP (ADVANCE CARE PLANNING)
He wants wife Yaritza Ochoa to make his decisions if needed and has ACP forms but not filled them out.

## 2018-10-24 NOTE — LETTER
10/25/2018 8:56 AM 
 
Mr. Dayna Marquez [address] Dear Dayna Marquez: 
 
Please find your most recent results below. Your labs are excellent. Continue the current treatment plan and keep up the good work! Call if you have questions. Resulted Orders HEMOGLOBIN Result Value Ref Range HGB 14.9 13.0 - 17.7 g/dL Narrative Performed at:  [address]  [id number] : Nisha Petty MD, Phone:  [phone] HEMOGLOBIN A1C WITH EAG Result Value Ref Range Hemoglobin A1c 5.8 (H) 4.8 - 5.6 % Comment:  
            Prediabetes: 5.7 - 6.4 Diabetes: >6.4 Glycemic control for adults with diabetes: <7.0 Estimated average glucose 120 mg/dL Narrative Performed at:  [address]  [id number] : Nisha Petty MD, Phone:  [phone] LIPID PANEL Result Value Ref Range Cholesterol, total 130 100 - 199 mg/dL Triglyceride 69 0 - 149 mg/dL HDL Cholesterol 48 >39 mg/dL VLDL, calculated 14 5 - 40 mg/dL LDL, calculated 68 0 - 99 mg/dL Narrative Performed at:  [address]  [id number] : Nisha Petty MD, Phone:  [phone] METABOLIC PANEL, COMPREHENSIVE Result Value Ref Range Glucose 95 65 - 99 mg/dL BUN 11 10 - 36 mg/dL Creatinine 0.90 0.76 - 1.27 mg/dL GFR est non-AA 75 >59 mL/min/1.73 GFR est AA 87 >59 mL/min/1.73  
 BUN/Creatinine ratio 12 10 - 24 Sodium 138 134 - 144 mmol/L Potassium 4.0 3.5 - 5.2 mmol/L Chloride 96 96 - 106 mmol/L  
 CO2 26 20 - 29 mmol/L Calcium 9.4 8.6 - 10.2 mg/dL Protein, total 7.5 6.0 - 8.5 g/dL Albumin 4.3 3.2 - 4.6 g/dL GLOBULIN, TOTAL 3.2 1.5 - 4.5 g/dL A-G Ratio 1.3 1.2 - 2.2 Bilirubin, total 0.7 0.0 - 1.2 mg/dL Alk.  phosphatase 54 39 - 117 IU/L  
 AST (SGOT) 20 0 - 40 IU/L  
 ALT (SGPT) 14 0 - 44 IU/L Narrative Performed at:  [address]  [id number] : Varinder Huertas MD, Phone:  [phone] Please call me if you have any questions: [phone] Sincerely, 
 
 
Cali Dasilva MD

## 2018-10-24 NOTE — PROGRESS NOTES
Body mass index is 25.02 kg/m². Chief Complaint Patient presents with  Labs Fasting  Hypertension 1. Have you been to the ER, urgent care clinic since your last visit? Hospitalized since your last visit? No 
 
2. Have you seen or consulted any other health care providers outside of the [address] [place] [address] since your last visit? Include any pap smears or colon screening. No 
 
Reviewed record in preparation for visit and have necessary documentation Pt did not bring medication to office visit for review Information was given to pt on Advanced Directives, Living Will Information was given on Shingles Vaccine Opportunity was given for questions Goals that were addressed and/or need to be completed after this appointment include:  
 
Health Maintenance Due Topic Date Due  Shingrix Vaccine Age 50> (1 of 2) [birth year]1977  
 HEMOGLOBIN A1C Q6M  06/08/2018  MICROALBUMIN Q1  08/07/2018

## 2018-10-24 NOTE — PATIENT INSTRUCTIONS

## 2018-10-25 LAB
ALBUMIN SERPL-MCNC: 4.3 G/DL (ref 3.2–4.6)
ALBUMIN/CREAT UR: <3.2 MG/G CREAT (ref 0–30)
ALBUMIN/GLOB SERPL: 1.3 {RATIO} (ref 1.2–2.2)
ALP SERPL-CCNC: 54 IU/L (ref 39–117)
ALT SERPL-CCNC: 14 IU/L (ref 0–44)
AST SERPL-CCNC: 20 IU/L (ref 0–40)
BILIRUB SERPL-MCNC: 0.7 MG/DL (ref 0–1.2)
BUN SERPL-MCNC: 11 MG/DL (ref 10–36)
BUN/CREAT SERPL: 12 (ref 10–24)
CALCIUM SERPL-MCNC: 9.4 MG/DL (ref 8.6–10.2)
CHLORIDE SERPL-SCNC: 96 MMOL/L (ref 96–106)
CHOLEST SERPL-MCNC: 130 MG/DL (ref 100–199)
CO2 SERPL-SCNC: 26 MMOL/L (ref 20–29)
CREAT SERPL-MCNC: 0.9 MG/DL (ref 0.76–1.27)
CREAT UR-MCNC: 94.6 MG/DL
EST. AVERAGE GLUCOSE BLD GHB EST-MCNC: 120 MG/DL
GLOBULIN SER CALC-MCNC: 3.2 G/DL (ref 1.5–4.5)
GLUCOSE SERPL-MCNC: 95 MG/DL (ref 65–99)
HBA1C MFR BLD: 5.8 % (ref 4.8–5.6)
HDLC SERPL-MCNC: 48 MG/DL
HGB BLD-MCNC: 14.9 G/DL (ref 13–17.7)
LDLC SERPL CALC-MCNC: 68 MG/DL (ref 0–99)
Lab: NORMAL
MICROALBUMIN UR-MCNC: <3 UG/ML
POTASSIUM SERPL-SCNC: 4 MMOL/L (ref 3.5–5.2)
PROT SERPL-MCNC: 7.5 G/DL (ref 6–8.5)
SODIUM SERPL-SCNC: 138 MMOL/L (ref 134–144)
TRIGL SERPL-MCNC: 69 MG/DL (ref 0–149)
VLDLC SERPL CALC-MCNC: 14 MG/DL (ref 5–40)

## 2018-11-09 RX ORDER — HYDROCHLOROTHIAZIDE 25 MG/1
TABLET ORAL
Qty: 90 TAB | Refills: 3 | Status: SHIPPED | OUTPATIENT
Start: 2018-11-09 | End: 2019-10-28 | Stop reason: SDUPTHER

## 2019-01-11 DIAGNOSIS — E78.00 PURE HYPERCHOLESTEROLEMIA: Chronic | ICD-10-CM

## 2019-01-12 RX ORDER — SIMVASTATIN 20 MG/1
20 TABLET, FILM COATED ORAL
Qty: 90 TAB | Refills: 3 | Status: SHIPPED | OUTPATIENT
Start: 2019-01-12 | End: 2020-01-23

## 2019-02-01 DIAGNOSIS — I10 ESSENTIAL HYPERTENSION WITH GOAL BLOOD PRESSURE LESS THAN 140/90: Chronic | ICD-10-CM

## 2019-02-01 RX ORDER — ENALAPRIL MALEATE 10 MG/1
TABLET ORAL
Qty: 30 TAB | Refills: 11 | Status: SHIPPED | OUTPATIENT
Start: 2019-02-01 | End: 2020-01-30

## 2019-02-26 ENCOUNTER — OFFICE VISIT (OUTPATIENT)
Dept: FAMILY MEDICINE CLINIC | Age: 84
End: 2019-02-26

## 2019-02-26 VITALS
SYSTOLIC BLOOD PRESSURE: 124 MMHG | WEIGHT: 178 LBS | DIASTOLIC BLOOD PRESSURE: 75 MMHG | HEART RATE: 69 BPM | HEIGHT: 69 IN | OXYGEN SATURATION: 95 % | BODY MASS INDEX: 26.36 KG/M2 | TEMPERATURE: 97.5 F | RESPIRATION RATE: 16 BRPM

## 2019-02-26 DIAGNOSIS — D75.1 ERYTHROCYTOSIS: Chronic | ICD-10-CM

## 2019-02-26 DIAGNOSIS — E11.40 TYPE 2 DIABETES MELLITUS WITH DIABETIC NEUROPATHY, WITHOUT LONG-TERM CURRENT USE OF INSULIN (HCC): Primary | Chronic | ICD-10-CM

## 2019-02-26 DIAGNOSIS — I10 ESSENTIAL HYPERTENSION: Chronic | ICD-10-CM

## 2019-02-26 DIAGNOSIS — E78.00 PURE HYPERCHOLESTEROLEMIA: Chronic | ICD-10-CM

## 2019-02-26 DIAGNOSIS — C61 PROSTATE CANCER (HCC): Chronic | ICD-10-CM

## 2019-02-26 PROBLEM — K92.2 LOWER GI BLEED: Status: RESOLVED | Noted: 2017-08-07 | Resolved: 2019-02-26

## 2019-02-26 NOTE — PROGRESS NOTES
[address] 
[phone]  
 
 
Progress Note Patient: Itzel Smith MRN: [id number]  SSN: [id number] YOB: 1927  Age: 80 y.o. Sex: male Chief Complaint Patient presents with  Diabetes  Hypertension  Labs Fasting Subjective:  
 
Encounter Diagnoses Name Primary?  Type 2 diabetes mellitus with diabetic neuropathy, without long-term current use of insulin ([facility]): This patient is managed under a comprehensive plan of care for Diabetes. Overall the patient feels well with good energy level. Key Antihyperglycemic Medications Patient is on no antihyperglycemic meds. Pertinent Labs:  
Lab Results Component Value Date/Time Hemoglobin A1c 5.8 (H) 10/24/2018 09:52 AM  
 Hemoglobin A1c 5.9 (H) 12/08/2017 11:53 AM  
 Hemoglobin A1c 6.0 (H) 08/07/2017 10:40 AM  
 
 Body mass index is 26.29 kg/m². Lab Results Component Value Date/Time LDL, calculated 68 10/24/2018 09:52 AM  
     
Lab Results Component Value Date/Time Sodium 138 10/24/2018 09:52 AM  
 Potassium 4.0 10/24/2018 09:52 AM  
 Chloride 96 10/24/2018 09:52 AM  
 CO2 26 10/24/2018 09:52 AM  
 Anion gap 8 04/10/2017 06:58 AM  
 Glucose 95 10/24/2018 09:52 AM  
 BUN 11 10/24/2018 09:52 AM  
 Creatinine 0.90 10/24/2018 09:52 AM  
 BUN/Creatinine ratio 12 10/24/2018 09:52 AM  
 GFR est AA 87 10/24/2018 09:52 AM  
 GFR est non-AA 75 10/24/2018 09:52 AM  
 Calcium 9.4 10/24/2018 09:52 AM  
 AST (SGOT) 20 10/24/2018 09:52 AM  
 Alk. phosphatase 54 10/24/2018 09:52 AM  
 Protein, total 7.5 10/24/2018 09:52 AM  
 Albumin 4.3 10/24/2018 09:52 AM  
 Globulin 3.8 04/10/2017 06:58 AM  
 A-G Ratio 1.3 10/24/2018 09:52 AM  
 ALT (SGPT) 14 10/24/2018 09:52 AM  
 
Lab Results Component Value Date/Time  Microalbumin/Creat ratio (mg/g creat) 5 07/15/2010 08:52 AM  
 Microalb/Creat ratio (ug/mg creat.) <3.2 10/24/2018 09:52 AM  
 Microalbumin,urine random 0.77 07/15/2010 08:52 AM  
 
 Frequency of home glucose testing:daily Blood Sugar range at home:  Last eye exam: In past 12 months. Last foot exam: today Polyuria, polyphagia or polydipsia: No 
 Retinopathy: No 
 Neuropathy SX: occasional  
 Low blood sugar symptoms: No 
 Dietary compliance: Good Medication compliance:Good On ASA: Yes Depression: No 
 CKD:no Wt Readings from Last 3 Encounters:  
02/26/19 178 lb (80.7 kg) 10/24/18 169 lb 6.4 oz (76.8 kg) 03/23/18 176 lb (79.8 kg) Social History Tobacco Use Smoking Status Former Smoker  Packs/day: 1.00  Years: 15.00  Pack years: 15.00 Smokeless Tobacco Never Used Body mass index is 26.29 kg/m². Diabetic Consultants: All the patient's questions regarding medications, diet and exercise were answered. Goal of A1C of less than 7.5% is our goal.  
Our overall goal is to reduce or eliminate the long term consequences of poorly controlled diabetes. Yes  Essential hypertension: 
BP Readings from Last 3 Encounters:  
02/26/19 124/75  
10/24/18 118/66  
03/23/18 154/75 The patient reports:  taking medications as instructed, no medication side effects noted, no TIA's, no chest pain on exertion, no dyspnea on exertion, no swelling of ankles. Key CAD CHF Meds   
    
  
 enalapril (VASOTEC) 10 mg tablet  (Taking) TAKE ONE TABLET BY MOUTH ONCE DAILY  
 simvastatin (ZOCOR) 20 mg tablet  (Taking) Take 1 Tab by mouth nightly. hydroCHLOROthiazide (HYDRODIURIL) 25 mg tablet  (Taking) TAKE ONE TABLET BY MOUTH ONCE DAILY  
 aspirin 81 mg Tab  (Taking) Take 81 mg by mouth daily. Lab Results Component Value Date/Time  Sodium 138 10/24/2018 09:52 AM  
 Potassium 4.0 10/24/2018 09:52 AM  
 Chloride 96 10/24/2018 09:52 AM  
 CO2 26 10/24/2018 09:52 AM  
 Anion gap 8 04/10/2017 06:58 AM  
 Glucose 95 10/24/2018 09:52 AM  
 BUN 11 10/24/2018 09:52 AM  
 Creatinine 0.90 10/24/2018 09:52 AM  
 BUN/Creatinine ratio 12 10/24/2018 09:52 AM  
 GFR est AA 87 10/24/2018 09:52 AM  
 GFR est non-AA 75 10/24/2018 09:52 AM  
 Calcium 9.4 10/24/2018 09:52 AM  
 Bilirubin, total 0.7 10/24/2018 09:52 AM  
 AST (SGOT) 20 10/24/2018 09:52 AM  
 Alk. phosphatase 54 10/24/2018 09:52 AM  
 Protein, total 7.5 10/24/2018 09:52 AM  
 Albumin 4.3 10/24/2018 09:52 AM  
 Globulin 3.8 04/10/2017 06:58 AM  
 A-G Ratio 1.3 10/24/2018 09:52 AM  
 ALT (SGPT) 14 10/24/2018 09:52 AM  
 
Low salt diet? yes Aerobic exercise? yes Our goal is to normalize the blood pressure to decrease the risks of strokes and heart attacks. The patient is in agreement with the plan.  Pure hypercholesterolemia: 
Cardiovascular risks for him are: LDL goal is under 80 
diabetic 
hypertension 
hyperlipidemia. Key Antihyperlipidemia Meds   
    
  
 simvastatin (ZOCOR) 20 mg tablet  (Taking) Take 1 Tab by mouth nightly. Lab Results Component Value Date/Time Cholesterol, total 130 10/24/2018 09:52 AM  
 HDL Cholesterol 48 10/24/2018 09:52 AM  
 LDL, calculated 68 10/24/2018 09:52 AM  
 Triglyceride 69 10/24/2018 09:52 AM  
 CHOL/HDL Ratio 3.1 07/15/2010 08:52 AM  
 
Lab Results Component Value Date/Time ALT (SGPT) 14 10/24/2018 09:52 AM  
 AST (SGOT) 20 10/24/2018 09:52 AM  
 Alk. phosphatase 54 10/24/2018 09:52 AM  
 Bilirubin, total 0.7 10/24/2018 09:52 AM  
 
 Myalgias: No 
 Fatigue: No 
 Other side effects: no Wt Readings from Last 3 Encounters:  
02/26/19 178 lb (80.7 kg) 10/24/18 169 lb 6.4 oz (76.8 kg) 03/23/18 176 lb (79.8 kg) The patient is aware of our goal to reduce or eliminate the long term problems (such as strokes and heart attacks) related to poorly controlled hyperlipidemia.  Prostate cancer [facility]): remote and no sx.  Erythrocytosis: resolved as of last testing. Current and past medical information: 
 
Current Medications after this visit[de-identified]    
Current Outpatient Medications Medication Sig  
 enalapril (VASOTEC) 10 mg tablet TAKE ONE TABLET BY MOUTH ONCE DAILY  simvastatin (ZOCOR) 20 mg tablet Take 1 Tab by mouth nightly.  hydroCHLOROthiazide (HYDRODIURIL) 25 mg tablet TAKE ONE TABLET BY MOUTH ONCE DAILY  triamcinolone acetonide (KENALOG) 0.1 % topical cream Apply  to affected area two (2) times a day. use thin layer  lancets (TRUEPLUS LANCETS) 33 gauge misc Use as directed to check blood sugar once daily. Dx:E11.9 Please dispense appropriate product  glucose blood VI test strips (TRUETEST TEST STRIPS) strip Use as directed to check blood sugar once daily. Dx:E11.9  Blood-Glucose Meter (TRUE METRIX GLUCOSE METER) misc Use as directed to check blood sugar once daily. Dx:E11.9 Please dispense appropriate product  glucose blood VI test strips (TRUE METRIX GLUCOSE TEST STRIP) strip Use as directed to check blood sugar once daily. Dx:E11.9 Please dispense appropriate product  acetaminophen (TYLENOL) 650 mg CR tablet Take 650 mg by mouth nightly.  tamsulosin (FLOMAX) 0.4 mg capsule Take 0.4 mg by mouth daily (after dinner).  finasteride (PROSCAR) 5 mg tablet Take 5 mg by mouth daily (after dinner).  cholecalciferol (VITAMIN D-3) 400 unit Tab tablet Take 400 Units by mouth daily.  aspirin 81 mg Tab Take 81 mg by mouth daily.  varicella-zoster gE vac,2 of 2 50 mcg susr Shingrix, one injection now and repeat in 4-6 months. To be administered at Pharmacy. Fax confirmation to me at [phone]. No current facility-administered medications for this visit. Patient Active Problem List  
 Diagnosis Date Noted  Type 2 diabetes mellitus with diabetic neuropathy ([facility] [250.60, 357.2] 08/05/2015 Priority: 1 - One  Prostate cancer ([facility] 01/27/2011 Priority: 1 - One  Colon polyp 11/16/2010   Priority: 1 - One  
  Hyperlipidemia 11/16/2010 Priority: 1 - One  
 Hypertension Priority: 1 - One  
 Erythrocytosis Priority: 1 - One  
 Erectile dysfunction Priority: 5 - Five  Hyperkeratosis 11/16/2010 Past Medical History:  
Diagnosis Date  Allergic rhinitis  Benign prostatic hypertrophy  Erectile dysfunction  Erythrocytosis  Hypertension  Polyp, colon No Known Allergies Past Surgical History:  
Procedure Laterality Date  COLONOSCOPY N/A 4/10/2017 COLONOSCOPY performed by Lee Ann Lebron MD at [address]  COLONOSCOPY,PAO CANTU,SNARE  4/10/2017 Social History Socioeconomic History  Marital status:  Spouse name: Not on file  Number of children: Not on file  Years of education: Not on file  Highest education level: Not on file Tobacco Use  Smoking status: Former Smoker Packs/day: 1.00 Years: 15.00 Pack years: 15.00  Smokeless tobacco: Never Used Substance and Sexual Activity  Alcohol use: No  
 Drug use: No  
 
 
Review of Systems Constitutional: Negative. Negative for chills, fever, malaise/fatigue and weight loss. HENT: Negative. Negative for hearing loss. Eyes: Negative. Negative for blurred vision and double vision. Respiratory: Negative. Negative for cough, sputum production and shortness of breath. Cardiovascular: Negative. Negative for chest pain and palpitations. Gastrointestinal: Negative. Negative for abdominal pain, blood in stool, heartburn, nausea and vomiting. Genitourinary: Negative. Negative for dysuria, frequency and urgency. Musculoskeletal: Negative. Negative for back pain, myalgias and neck pain. Skin: Negative. Negative for rash. Neurological: Negative. Negative for dizziness, tingling, tremors, weakness and headaches. Endo/Heme/Allergies: Negative. Psychiatric/Behavioral: Negative. Negative for depression. Objective:  
 
Vitals: 02/26/19 6124 BP: 124/75 Pulse: 69 Resp: 16 Temp: 97.5 °F (36.4 °C) TempSrc: Oral  
SpO2: 95% Weight: 178 lb (80.7 kg) Height: 5' 9\" (1.753 m) Body mass index is 26.29 kg/m². Physical Exam  
Constitutional: He is oriented to person, place, and time and well-developed, well-nourished, and in no distress. HENT:  
Head: Normocephalic and atraumatic. Mouth/Throat: Oropharynx is clear and moist.  
Eyes: Right eye exhibits no discharge. Left eye exhibits no discharge. No scleral icterus. Neck: No thyromegaly present. No bruit. Cardiovascular: Normal rate, regular rhythm and normal heart sounds. Loud S1 Pulmonary/Chest: Effort normal and breath sounds normal. He has no wheezes. He has no rales. Abdominal: Soft. He exhibits no distension and no mass. There is no tenderness. Neurological: He is alert and oriented to person, place, and time. Diabetic foot exam:  
 
Left Foot: 
 Visual Exam: normal  
 Pulse DP: absent Filament test: normal sensation Vibratory sensation: normal 
   
Right Foot: 
 Visual Exam: normal  
 Pulse DP: absent Filament test: normal sensation Vibratory sensation: normal 
  
Skin: No rash noted. No erythema. Psychiatric: Mood and affect normal.  
Nursing note and vitals reviewed. Health Maintenance Due Topic Date Due  
 FOOT EXAM Q1  12/08/2018 Assessment and orders:  
 
Encounter Diagnoses ICD-10-CM ICD-9-CM 1. Type 2 diabetes mellitus with diabetic neuropathy, without long-term current use of insulin ([facility]) E11.40 250.60  
  357.2 2. Essential hypertension I10 401.9 3. Pure hypercholesterolemia E78.00 272.0 4. Prostate cancer ([facility] C61 185  
5. Erythrocytosis D75.1 289.0 Diagnoses and all orders for this visit: 
 
1. Type 2 diabetes mellitus with diabetic neuropathy, without long-term current use of insulin ([facility])-check labs. He is not on any diabetic medication now.  
-     HEMOGLOBIN A1C WITH EAG 
 -     LIPID PANEL 
-     METABOLIC PANEL, COMPREHENSIVE 
-      DIABETES FOOT EXAM 
 
2. Essential hypertension-controlled -     LIPID PANEL 
-     METABOLIC PANEL, COMPREHENSIVE 3. Pure hypercholesterolemia-retest 
-     LIPID PANEL 
-     METABOLIC PANEL, COMPREHENSIVE 4. Prostate cancer (H[facility])-no symptoms of obstruction. 5. Erythrocytosis-resolved as of last testing. Plan of care: 
Discussed diagnoses in detail with patient. Medication risks/benefits/side effects discussed with patient. All of the patient's questions were addressed. The patient understands and agrees with our plan of care. The patient knows to call back if they are unsure of or forget any changes we discussed today or if the symptoms change. The patient received an After-Visit Summary which contains VS, orders,. List and allergy list. This can be used as a \"mini-medical record\" should they have to seek medical care while out of town. Patient Care Team: 
Maciel Roblero MD as PCP - General 
 
Follow-up Disposition: 
Return in about 3 months (around 5/26/2019). No future appointments. Signed By: Richard Cisneros MD   
 February 26, 2019 This note was created using voice recognition software. Despite editing, there may be syntax errors.

## 2019-02-26 NOTE — PROGRESS NOTES
Chief Complaint Patient presents with  Diabetes  Hypertension  Labs Fasting Body mass index is 26.29 kg/m². 1. Have you been to the ER, urgent care clinic since your last visit? Hospitalized since your last visit? No 
 
2. Have you seen or consulted any other health care providers outside of the [address] [place] [address] since your last visit? Include any pap smears or colon screening. No 
 
Reviewed record in preparation for visit and have necessary documentation Pt did not bring medication to office visit for review Information was given to pt on Advanced Directives, Living Will Information was given on Shingles Vaccine Opportunity was given for questions Goals that were addressed and/or need to be completed after this appointment include:  
 
Health Maintenance Due Topic Date Due  
 FOOT EXAM Q1  12/08/2018

## 2019-02-26 NOTE — PATIENT INSTRUCTIONS

## 2019-02-27 LAB
ALBUMIN SERPL-MCNC: 4.1 G/DL (ref 3.2–4.6)
ALBUMIN/GLOB SERPL: 1.3 {RATIO} (ref 1.2–2.2)
ALP SERPL-CCNC: 57 IU/L (ref 39–117)
ALT SERPL-CCNC: 15 IU/L (ref 0–44)
AST SERPL-CCNC: 18 IU/L (ref 0–40)
BILIRUB SERPL-MCNC: 0.6 MG/DL (ref 0–1.2)
BUN SERPL-MCNC: 13 MG/DL (ref 10–36)
BUN/CREAT SERPL: 14 (ref 10–24)
CALCIUM SERPL-MCNC: 8.9 MG/DL (ref 8.6–10.2)
CHLORIDE SERPL-SCNC: 100 MMOL/L (ref 96–106)
CHOLEST SERPL-MCNC: 123 MG/DL (ref 100–199)
CO2 SERPL-SCNC: 27 MMOL/L (ref 20–29)
CREAT SERPL-MCNC: 0.92 MG/DL (ref 0.76–1.27)
EST. AVERAGE GLUCOSE BLD GHB EST-MCNC: 126 MG/DL
GLOBULIN SER CALC-MCNC: 3.2 G/DL (ref 1.5–4.5)
GLUCOSE SERPL-MCNC: 87 MG/DL (ref 65–99)
HBA1C MFR BLD: 6 % (ref 4.8–5.6)
HDLC SERPL-MCNC: 45 MG/DL
LDLC SERPL CALC-MCNC: 63 MG/DL (ref 0–99)
POTASSIUM SERPL-SCNC: 4.5 MMOL/L (ref 3.5–5.2)
PROT SERPL-MCNC: 7.3 G/DL (ref 6–8.5)
SODIUM SERPL-SCNC: 140 MMOL/L (ref 134–144)
TRIGL SERPL-MCNC: 74 MG/DL (ref 0–149)
VLDLC SERPL CALC-MCNC: 15 MG/DL (ref 5–40)

## 2019-05-06 ENCOUNTER — HOSPITAL ENCOUNTER (OUTPATIENT)
Dept: NUCLEAR MEDICINE | Age: 84
Discharge: HOME | End: 2019-05-06
Attending: UROLOGY
Payer: MEDICARE

## 2019-05-06 ENCOUNTER — HOSPITAL ENCOUNTER (OUTPATIENT)
Dept: CT IMAGING | Age: 84
Discharge: HOME | End: 2019-05-06
Attending: UROLOGY
Payer: MEDICARE

## 2019-05-06 DIAGNOSIS — C61 PROSTATE CANCER (HCC): ICD-10-CM

## 2019-05-06 PROCEDURE — 74011636320 HC RX REV CODE- 636/320

## 2019-05-06 PROCEDURE — 74177 CT ABD & PELVIS W/CONTRAST: CPT

## 2019-05-06 PROCEDURE — 78306 BONE IMAGING WHOLE BODY: CPT

## 2019-05-06 RX ADMIN — IOPAMIDOL 90 ML: 755 INJECTION, SOLUTION INTRAVENOUS at 15:02

## 2019-06-04 ENCOUNTER — TELEPHONE (OUTPATIENT)
Dept: FAMILY MEDICINE CLINIC | Age: 84
End: 2019-06-04

## 2019-07-12 ENCOUNTER — OFFICE VISIT (OUTPATIENT)
Dept: FAMILY MEDICINE CLINIC | Age: 84
End: 2019-07-12

## 2019-07-12 VITALS
HEIGHT: 69 IN | TEMPERATURE: 97.5 F | RESPIRATION RATE: 18 BRPM | BODY MASS INDEX: 24.88 KG/M2 | OXYGEN SATURATION: 99 % | WEIGHT: 168 LBS | SYSTOLIC BLOOD PRESSURE: 111 MMHG | DIASTOLIC BLOOD PRESSURE: 70 MMHG | HEART RATE: 64 BPM

## 2019-07-12 DIAGNOSIS — E11.40 TYPE 2 DIABETES MELLITUS WITH DIABETIC NEUROPATHY, WITHOUT LONG-TERM CURRENT USE OF INSULIN (HCC): Primary | Chronic | ICD-10-CM

## 2019-07-12 DIAGNOSIS — E78.00 PURE HYPERCHOLESTEROLEMIA: Chronic | ICD-10-CM

## 2019-07-12 DIAGNOSIS — D75.1 ERYTHROCYTOSIS: Chronic | ICD-10-CM

## 2019-07-12 DIAGNOSIS — I10 ESSENTIAL HYPERTENSION: Chronic | ICD-10-CM

## 2019-07-12 NOTE — PROGRESS NOTES
[address], [address] [facility]  [phone]           Progress Note    Patient: Lisa Underwood MRN: [id number]  SSN: [id number]    YOB: 1927  Age: 80 y.o. Sex: male        Chief Complaint   Patient presents with    Diabetes    Labs         Subjective:     Encounter Diagnoses   Name Primary?  Type 2 diabetes mellitus with diabetic neuropathy, without long-term current use of insulin ([facility] [address]): This patient is managed under a comprehensive plan of care for Diabetes. Overall the patient feels well with good energy level. Key Antihyperglycemic Medications     Patient is on no antihyperglycemic meds. Pertinent Labs:   Lab Results   Component Value Date/Time    Hemoglobin A1c 6.0 (H) 02/26/2019 12:16 PM    Hemoglobin A1c 5.8 (H) 10/24/2018 09:52 AM    Hemoglobin A1c 5.9 (H) 12/08/2017 11:53 AM      Body mass index is 24.81 kg/m². Lab Results   Component Value Date/Time    LDL, calculated 63 02/26/2019 12:16 PM         Lab Results   Component Value Date/Time    Sodium 140 02/26/2019 12:16 PM    Potassium 4.5 02/26/2019 12:16 PM    Chloride 100 02/26/2019 12:16 PM    CO2 27 02/26/2019 12:16 PM    Anion gap 8 04/10/2017 06:58 AM    Glucose 87 02/26/2019 12:16 PM    BUN 13 02/26/2019 12:16 PM    Creatinine 0.92 02/26/2019 12:16 PM    BUN/Creatinine ratio 14 02/26/2019 12:16 PM    GFR est AA 84 02/26/2019 12:16 PM    GFR est non-AA 72 02/26/2019 12:16 PM    Calcium 8.9 02/26/2019 12:16 PM    AST (SGOT) 18 02/26/2019 12:16 PM    Alk.  phosphatase 57 02/26/2019 12:16 PM    Protein, total 7.3 02/26/2019 12:16 PM    Albumin 4.1 02/26/2019 12:16 PM    Globulin 3.8 04/10/2017 06:58 AM    A-G Ratio 1.3 02/26/2019 12:16 PM    ALT (SGPT) 15 02/26/2019 12:16 PM     Lab Results   Component Value Date/Time    Microalbumin/Creat ratio (mg/g creat) 5 07/15/2010 08:52 AM    Microalb/Creat ratio (ug/mg creat.) <3.2 10/24/2018 09:52 AM Microalbumin,urine random 0.77 07/15/2010 08:52 AM      Frequency of home glucose testing:daily   Blood Sugar range at home: Less than 140   Last eye exam: In past 12 months. Last foot exam: This year. Polyuria, polyphagia or polydipsia: No   Retinopathy: No   Neuropathy SX: No    Low blood sugar symptoms: No   Dietary compliance: Good   Medication compliance:Good   On ASA: Yes   Depression: No   CKD:no     Wt Readings from Last 3 Encounters:   07/12/19 168 lb (76.2 kg)   02/26/19 178 lb (80.7 kg)   10/24/18 169 lb 6.4 oz (76.8 kg)        Social History     Tobacco Use   Smoking Status Former Smoker    Packs/day: 1.00    Years: 15.00    Pack years: 15.00   Smokeless Tobacco Never Used     Body mass index is 24.81 kg/m². All the patient's questions regarding medications, diet and exercise were answered. Goal of A1C of less than 7.5% is our goal.   Our overall goal is to reduce or eliminate the long term consequences of poorly controlled diabetes. Yes    Essential hypertension:  BP Readings from Last 3 Encounters:   07/12/19 111/70   02/26/19 124/75   10/24/18 118/66     The patient reports:  taking medications as instructed, no medication side effects noted, no TIA's, no chest pain on exertion, no dyspnea on exertion, no swelling of ankles. Key CAD CHF Meds             enalapril (VASOTEC) 10 mg tablet (Taking) TAKE ONE TABLET BY MOUTH ONCE DAILY    simvastatin (ZOCOR) 20 mg tablet (Taking) Take 1 Tab by mouth nightly. hydroCHLOROthiazide (HYDRODIURIL) 25 mg tablet (Taking) TAKE ONE TABLET BY MOUTH ONCE DAILY    aspirin 81 mg Tab (Taking) Take 81 mg by mouth daily.            Lab Results   Component Value Date/Time    Sodium 140 02/26/2019 12:16 PM    Potassium 4.5 02/26/2019 12:16 PM    Chloride 100 02/26/2019 12:16 PM    CO2 27 02/26/2019 12:16 PM    Anion gap 8 04/10/2017 06:58 AM    Glucose 87 02/26/2019 12:16 PM    BUN 13 02/26/2019 12:16 PM    Creatinine 0.92 02/26/2019 12:16 PM BUN/Creatinine ratio 14 02/26/2019 12:16 PM    GFR est AA 84 02/26/2019 12:16 PM    GFR est non-AA 72 02/26/2019 12:16 PM    Calcium 8.9 02/26/2019 12:16 PM    Bilirubin, total 0.6 02/26/2019 12:16 PM    AST (SGOT) 18 02/26/2019 12:16 PM    Alk. phosphatase 57 02/26/2019 12:16 PM    Protein, total 7.3 02/26/2019 12:16 PM    Albumin 4.1 02/26/2019 12:16 PM    Globulin 3.8 04/10/2017 06:58 AM    A-G Ratio 1.3 02/26/2019 12:16 PM    ALT (SGPT) 15 02/26/2019 12:16 PM     Low salt diet? yes  Aerobic exercise? no  Our goal is to normalize the blood pressure to decrease the risks of strokes and heart attacks. The patient is in agreement with the plan.  Erythrocytosis     Pure hypercholesterolemia              Current and past medical information:    Current Medications after this visit[de-identified]     Current Outpatient Medications   Medication Sig    enalapril (VASOTEC) 10 mg tablet TAKE ONE TABLET BY MOUTH ONCE DAILY    simvastatin (ZOCOR) 20 mg tablet Take 1 Tab by mouth nightly.  hydroCHLOROthiazide (HYDRODIURIL) 25 mg tablet TAKE ONE TABLET BY MOUTH ONCE DAILY    triamcinolone acetonide (KENALOG) 0.1 % topical cream Apply  to affected area two (2) times a day. use thin layer    lancets (TRUEPLUS LANCETS) 33 gauge misc Use as directed to check blood sugar once daily. Dx:E11.9 Please dispense appropriate product    glucose blood VI test strips (TRUETEST TEST STRIPS) strip Use as directed to check blood sugar once daily. Dx:E11.9    Blood-Glucose Meter (TRUE METRIX GLUCOSE METER) misc Use as directed to check blood sugar once daily. Dx:E11.9 Please dispense appropriate product    glucose blood VI test strips (TRUE METRIX GLUCOSE TEST STRIP) strip Use as directed to check blood sugar once daily. Dx:E11.9 Please dispense appropriate product    acetaminophen (TYLENOL) 650 mg CR tablet Take 650 mg by mouth nightly.  tamsulosin (FLOMAX) 0.4 mg capsule Take 0.4 mg by mouth daily (after dinner).     finasteride (PROSCAR) 5 mg tablet Take 5 mg by mouth daily (after dinner).  cholecalciferol (VITAMIN D-3) 400 unit Tab tablet Take 400 Units by mouth daily.  aspirin 81 mg Tab Take 81 mg by mouth daily.  varicella-zoster gE vac,2 of 2 50 mcg susr Shingrix, one injection now and repeat in 4-6 months. To be administered at Pharmacy. Fax confirmation to me at [phone]. No current facility-administered medications for this visit. Patient Active Problem List    Diagnosis Date Noted    [facility] [250.60, 357.2] 08/05/2015     Priority: 1 - One    Prostate cancer ([facility] 01/27/2011     Priority: 1 - One    Colon polyp 11/16/2010     Priority: 1 - One    Hyperlipidemia 11/16/2010     Priority: 1 - One    Hypertension      Priority: 1 - One    Erythrocytosis      Priority: 1 - One    Erectile dysfunction      Priority: 5 - Five    Hyperkeratosis 11/16/2010       Past Medical History:   Diagnosis Date    Allergic rhinitis     Benign prostatic hypertrophy     Erectile dysfunction     Erythrocytosis     Hypertension     Polyp, colon        No Known Allergies    Past Surgical History:   Procedure Laterality Date    COLONOSCOPY N/A 4/10/2017    COLONOSCOPY performed by Guy Vargas MD at [address]  4/10/2017            Social History     Socioeconomic History    Marital status:      Spouse name: Not on file    Number of children: Not on file    Years of education: Not on file    Highest education level: Not on file   Tobacco Use    Smoking status: Former Smoker     Packs/day: 1.00     Years: 15.00     Pack years: 15.00    Smokeless tobacco: Never Used   Substance and Sexual Activity    Alcohol use: No    Drug use: No       Review of Systems   Constitutional: Negative. Negative for chills, fever, malaise/fatigue and weight loss. HENT: Negative. Negative for hearing loss. Eyes: Negative. Respiratory: Negative. Negative for cough, sputum production and shortness of breath. Cardiovascular: Negative. Negative for chest pain and palpitations. Gastrointestinal: Negative. Negative for abdominal pain, blood in stool, heartburn, nausea and vomiting. Genitourinary: Negative. Negative for dysuria, frequency and urgency. Musculoskeletal: Negative. Negative for back pain, falls, myalgias and neck pain. Skin: Negative. Negative for rash. Neurological: Negative. Negative for dizziness, tingling, tremors, weakness and headaches. Endo/Heme/Allergies: Negative. Psychiatric/Behavioral: Negative. Negative for depression. Objective:     Vitals:    07/12/19 0830   BP: 111/70   Pulse: 64   Resp: 18   Temp: 97.5 °F (36.4 °C)   TempSrc: Oral   SpO2: 99%   Weight: 168 lb (76.2 kg)   Height: 5' 9\" (1.753 m)      Body mass index is 24.81 kg/m². Physical Exam   Constitutional: He is oriented to person, place, and time and well-developed, well-nourished, and in no distress. HENT:   Head: Normocephalic and atraumatic. Mouth/Throat: Oropharynx is clear and moist.   Eyes: Right eye exhibits no discharge. Left eye exhibits no discharge. No scleral icterus. Neck: No thyromegaly present. No bruit. Cardiovascular: Normal rate and regular rhythm. Exam reveals no friction rub. No murmur heard. Pulmonary/Chest: Effort normal and breath sounds normal. No respiratory distress. He has no wheezes. He has no rales. Abdominal: Soft. He exhibits no distension. There is no tenderness. There is no rebound. Neurological: He is alert and oriented to person, place, and time. Skin: No rash noted. No erythema. Psychiatric: Mood and affect normal.   Nursing note and vitals reviewed. Health Maintenance Due   Topic Date Due    EYE EXAM RETINAL OR DILATED  12/01/2018    MEDICARE YEARLY EXAM  03/24/2019         Assessment and orders:     Encounter Diagnoses     ICD-10-CM ICD-9-CM   1.  Type 2 diabetes mellitus with diabetic neuropathy, without long-term current use of insulin (HCC) E11.40 250.60     357.2   2. Essential hypertension I10 401.9   3. Erythrocytosis D75.1 289.0   4. Pure hypercholesterolemia E78.00 272.0     Diagnoses and all orders for this visit:    1. Type 2 diabetes mellitus with diabetic neuropathy, without long-term current use of insulin (HCC)-retest  -     HEMOGLOBIN A1C WITH EAG  -     LIPID PANEL  -     METABOLIC PANEL, COMPREHENSIVE    2. Essential hypertension-controlled  -     LIPID PANEL  -     METABOLIC PANEL, COMPREHENSIVE    3. Erythrocytosis-retest  -     HEMOGLOBIN    4. Pure hypercholesterolemia-retest  -     LIPID PANEL  -     METABOLIC PANEL, COMPREHENSIVE            Plan of care:  Discussed diagnoses in detail with patient. Medication risks/benefits/side effects discussed with patient. All of the patient's questions were addressed. The patient understands and agrees with our plan of care. The patient knows to call back if they are unsure of or forget any changes we discussed today or if the symptoms change. The patient received an After-Visit Summary which contains VS, orders, medication list and allergy list. This can be used as a \"mini-medical record\" should they have to seek medical care while out of town. Patient Care Team:  Mare Curry MD as PCP - Cristina Vicente MD (Ophthalmology)    Follow-up and Dispositions    · Return in about 1 month (around 8/9/2019) for due for medicare wellness, [facility]. Future Appointments   Date Time Provider Fatou Lopez   8/8/2019  9:45 AM Romelia Frank MD [facility] NIMESH SCHED       Signed By: Stephanie Sosa MD     July 12, 2019      ATTENTION:   This medical record was transcribed using an electronic medical records/speech recognition system. Although proofread, it may and can contain electronic, spelling and other errors. Corrections may be executed at a later time. Please feel free to contact me for any clarifications as needed.

## 2019-07-12 NOTE — PATIENT INSTRUCTIONS

## 2019-07-12 NOTE — PROGRESS NOTES
1. Have you been to the ER, urgent care clinic since your last visit? Hospitalized since your last visit? No    2. Have you seen or consulted any other health care providers outside of the [address] [place] [address] since your last visit? Include any pap smears or colon screening.  No  Reviewed record in preparation for visit and have necessary documentation  Pt did not bring medication to office visit for review  Information was given to pt on Advanced Directives, Living Will  Information was given on Shingles Vaccine  opportunity was given for questions  Goals that were addressed and/or need to be completed during or after this appointment include     Health Maintenance Due   Topic Date Due    Pneumococcal 65+ years (2 of 2 - PPSV23) 08/05/2016    EYE EXAM RETINAL OR DILATED  12/01/2018    MEDICARE YEARLY EXAM  03/24/2019

## 2019-07-13 LAB
ALBUMIN SERPL-MCNC: 4.4 G/DL (ref 3.2–4.6)
ALBUMIN/GLOB SERPL: 1.3 {RATIO} (ref 1.2–2.2)
ALP SERPL-CCNC: 56 IU/L (ref 39–117)
ALT SERPL-CCNC: 11 IU/L (ref 0–44)
AST SERPL-CCNC: 20 IU/L (ref 0–40)
BILIRUB SERPL-MCNC: 1 MG/DL (ref 0–1.2)
BUN SERPL-MCNC: 8 MG/DL (ref 10–36)
BUN/CREAT SERPL: 9 (ref 10–24)
CALCIUM SERPL-MCNC: 9.6 MG/DL (ref 8.6–10.2)
CHLORIDE SERPL-SCNC: 96 MMOL/L (ref 96–106)
CHOLEST SERPL-MCNC: 127 MG/DL (ref 100–199)
CO2 SERPL-SCNC: 28 MMOL/L (ref 20–29)
CREAT SERPL-MCNC: 0.89 MG/DL (ref 0.76–1.27)
EST. AVERAGE GLUCOSE BLD GHB EST-MCNC: 126 MG/DL
GLOBULIN SER CALC-MCNC: 3.4 G/DL (ref 1.5–4.5)
GLUCOSE SERPL-MCNC: 91 MG/DL (ref 65–99)
HBA1C MFR BLD: 6 % (ref 4.8–5.6)
HDLC SERPL-MCNC: 40 MG/DL
HGB BLD-MCNC: 15.9 G/DL (ref 13–17.7)
LDLC SERPL CALC-MCNC: 65 MG/DL (ref 0–99)
POTASSIUM SERPL-SCNC: 4.2 MMOL/L (ref 3.5–5.2)
PROT SERPL-MCNC: 7.8 G/DL (ref 6–8.5)
SODIUM SERPL-SCNC: 135 MMOL/L (ref 134–144)
TRIGL SERPL-MCNC: 111 MG/DL (ref 0–149)
VLDLC SERPL CALC-MCNC: 22 MG/DL (ref 5–40)

## 2019-10-04 ENCOUNTER — CLINICAL SUPPORT (OUTPATIENT)
Dept: FAMILY MEDICINE CLINIC | Age: 84
End: 2019-10-04

## 2019-10-04 VITALS
RESPIRATION RATE: 20 BRPM | HEART RATE: 76 BPM | DIASTOLIC BLOOD PRESSURE: 67 MMHG | TEMPERATURE: 98.4 F | OXYGEN SATURATION: 98 % | WEIGHT: 168 LBS | BODY MASS INDEX: 24.88 KG/M2 | SYSTOLIC BLOOD PRESSURE: 113 MMHG | HEIGHT: 69 IN

## 2019-10-04 DIAGNOSIS — Z23 ENCOUNTER FOR IMMUNIZATION: Primary | ICD-10-CM

## 2019-10-04 NOTE — PROGRESS NOTES
Influenza vaccine administered 10/4/2019 by Osorio Weaver LPN with consent. Patient tolerated well. No reactions noted.

## 2019-10-28 RX ORDER — HYDROCHLOROTHIAZIDE 25 MG/1
TABLET ORAL
Qty: 90 TAB | Refills: 3 | Status: SHIPPED | OUTPATIENT
Start: 2019-10-28 | End: 2020-10-26

## 2020-02-04 ENCOUNTER — OFFICE VISIT (OUTPATIENT)
Dept: FAMILY MEDICINE CLINIC | Age: 85
End: 2020-02-04

## 2020-02-04 ENCOUNTER — HOSPITAL ENCOUNTER (OUTPATIENT)
Dept: LAB | Age: 85
Discharge: HOME | End: 2020-02-04

## 2020-02-04 VITALS
WEIGHT: 166 LBS | TEMPERATURE: 97.6 F | SYSTOLIC BLOOD PRESSURE: 126 MMHG | BODY MASS INDEX: 24.59 KG/M2 | OXYGEN SATURATION: 98 % | HEIGHT: 69 IN | DIASTOLIC BLOOD PRESSURE: 78 MMHG | RESPIRATION RATE: 18 BRPM | HEART RATE: 73 BPM

## 2020-02-04 DIAGNOSIS — E78.00 PURE HYPERCHOLESTEROLEMIA: ICD-10-CM

## 2020-02-04 DIAGNOSIS — E11.40 TYPE 2 DIABETES MELLITUS WITH DIABETIC NEUROPATHY, WITHOUT LONG-TERM CURRENT USE OF INSULIN (HCC): ICD-10-CM

## 2020-02-04 DIAGNOSIS — I10 ESSENTIAL HYPERTENSION: ICD-10-CM

## 2020-02-04 DIAGNOSIS — C61 PROSTATE CANCER (HCC): ICD-10-CM

## 2020-02-04 DIAGNOSIS — R42 VERTIGO: ICD-10-CM

## 2020-02-04 DIAGNOSIS — E11.40 TYPE 2 DIABETES MELLITUS WITH DIABETIC NEUROPATHY, WITHOUT LONG-TERM CURRENT USE OF INSULIN (HCC): Primary | ICD-10-CM

## 2020-02-04 LAB
ERYTHROCYTE [DISTWIDTH] IN BLOOD BY AUTOMATED COUNT: 14.4 % (ref 11.5–14.5)
HCT VFR BLD AUTO: 52.7 % (ref 36.6–50.3)
HGB BLD-MCNC: 16.8 G/DL (ref 12.1–17)
MCH RBC QN AUTO: 32.2 PG (ref 26–34)
MCHC RBC AUTO-ENTMCNC: 31.9 G/DL (ref 30–36.5)
MCV RBC AUTO: 101.2 FL (ref 80–99)
NRBC # BLD: 0 K/UL (ref 0–0.01)
NRBC BLD-RTO: 0 PER 100 WBC
PLATELET # BLD AUTO: 152 K/UL (ref 150–400)
PMV BLD AUTO: 10.9 FL (ref 8.9–12.9)
RBC # BLD AUTO: 5.21 M/UL (ref 4.1–5.7)
WBC # BLD AUTO: 6.3 K/UL (ref 4.1–11.1)

## 2020-02-04 RX ORDER — MECLIZINE HCL 12.5 MG 12.5 MG/1
12.5 TABLET ORAL
Qty: 30 TAB | Refills: 2 | Status: SHIPPED | OUTPATIENT
Start: 2020-02-04 | End: 2020-02-04 | Stop reason: SDUPTHER

## 2020-02-04 RX ORDER — MECLIZINE HCL 12.5 MG 12.5 MG/1
12.5 TABLET ORAL
Qty: 30 TAB | Refills: 2 | Status: SHIPPED | OUTPATIENT
Start: 2020-02-04 | End: 2020-02-14

## 2020-02-04 NOTE — PATIENT INSTRUCTIONS
Vertigo: Care Instructions Your Care Instructions Vertigo is the feeling that you or your surroundings are moving when there is no actual movement. It is often described as a feeling of spinning, whirling, falling, or tilting. Vertigo may make you vomit or feel nauseated. You may have trouble standing or walking and may lose your balance. Vertigo is often related to an inner ear problem, but it can have other more serious causes. If vertigo continues, you may need more tests to find its cause. Follow-up care is a key part of your treatment and safety. Be sure to make and go to all appointments, and call your doctor if you are having problems. It's also a good idea to know your test results and keep a list of the medicines you take. How can you care for yourself at home? · Do not lie flat on your back. Prop yourself up slightly. This may reduce the spinning feeling. Keep your eyes open. · Move slowly so that you do not fall. · If your doctor recommends medicine, take it exactly as directed. · Do not drive while you are having vertigo. Certain exercises, called Blank-Daroff exercises, can help decrease vertigo. To do Blank-Daroff exercises: · Sit on the edge of a bed or sofa and quickly lie down on the side that causes the worst vertigo. Lie on your side with your ear down. · Stay in this position for at least 30 seconds or until the vertigo goes away. · Sit up. If this causes vertigo, wait for it to stop. · Repeat the procedure on the other side. · Repeat this 10 times. Do these exercises 2 times a day until the vertigo is gone. When should you call for help? Call 911 anytime you think you may need emergency care. For example, call if: 
  · You passed out (lost consciousness).  
  · You have symptoms of a stroke. These may include: 
? Sudden numbness, tingling, weakness, or loss of movement in your face, arm, or leg, especially on only one side of your body. ? Sudden vision changes. ? Sudden trouble speaking. ? Sudden confusion or trouble understanding simple statements. ? Sudden problems with walking or balance. ? A sudden, severe headache that is different from past headaches.  
 Call your doctor now or seek immediate medical care if: 
  · Vertigo occurs with a fever, a headache, or ringing in your ears.  
  · You have new or increased nausea and vomiting.  
 Watch closely for changes in your health, and be sure to contact your doctor if: 
  · Vertigo gets worse or happens more often.  
  · Vertigo has not gotten better after 2 weeks. Where can you learn more? Go to http://marylu-jennifer.info/. Enter Y577 in the search box to learn more about \"Vertigo: Care Instructions. \" Current as of: October 21, 2018 Content Version: 12.2 © 4910-1426 [employer]. Care instructions adapted under license by [employer] (which disclaims liability or warranty for this information). If you have questions about a medical condition or this instruction, always ask your healthcare professional. [address] any warranty or liability for your use of this information.

## 2020-02-04 NOTE — PROGRESS NOTES
[address] [place] [address]  [phone]           Progress Note    Patient: Asha Willis MRN: [id number]  SSN: [id number]    YOB: 1927  Age: 80 y.o. Sex: male        Chief Complaint   Patient presents with    Dizziness         Subjective:     Encounter Diagnoses   Name Primary?  Type 2 diabetes mellitus with diabetic neuropathy, without long-term current use of insulin ([facility] [address]): This patient is managed under a comprehensive plan of care for Diabetes. Overall the patient feels well with good energy level. Key Antihyperglycemic Medications     Patient is on no antihyperglycemic meds. Pertinent Labs:   Lab Results   Component Value Date/Time    Hemoglobin A1c 6.0 (H) 07/12/2019 09:13 AM    Hemoglobin A1c 6.0 (H) 02/26/2019 12:16 PM    Hemoglobin A1c 5.8 (H) 10/24/2018 09:52 AM      Body mass index is 24.51 kg/m². Lab Results   Component Value Date/Time    LDL, calculated 65 07/12/2019 09:13 AM         Lab Results   Component Value Date/Time    Sodium 135 07/12/2019 09:13 AM    Potassium 4.2 07/12/2019 09:13 AM    Chloride 96 07/12/2019 09:13 AM    CO2 28 07/12/2019 09:13 AM    Anion gap 8 04/10/2017 06:58 AM    Glucose 91 07/12/2019 09:13 AM    BUN 8 (L) 07/12/2019 09:13 AM    Creatinine 0.89 07/12/2019 09:13 AM    BUN/Creatinine ratio 9 (L) 07/12/2019 09:13 AM    GFR est AA 86 07/12/2019 09:13 AM    GFR est non-AA 75 07/12/2019 09:13 AM    Calcium 9.6 07/12/2019 09:13 AM    AST (SGOT) 20 07/12/2019 09:13 AM    Alk.  phosphatase 56 07/12/2019 09:13 AM    Protein, total 7.8 07/12/2019 09:13 AM    Albumin 4.4 07/12/2019 09:13 AM    Globulin 3.8 04/10/2017 06:58 AM    A-G Ratio 1.3 07/12/2019 09:13 AM    ALT (SGPT) 11 07/12/2019 09:13 AM     Lab Results   Component Value Date/Time    Microalbumin/Creat ratio (mg/g creat) 5 07/15/2010 08:52 AM    Microalb/Creat ratio (ug/mg creat.) <3.2 10/24/2018 09:52 AM Microalbumin,urine random 0.77 07/15/2010 08:52 AM      Frequency of home glucose testing:no logs   Blood Sugar range at home:    Last eye exam: In past 12 months. Last foot exam: This year. Polyuria, polyphagia or polydipsia: No   Retinopathy: No   Neuropathy SX: No    Low blood sugar symptoms: No   Dietary compliance: Good   Medication compliance:Good   On ASA: Yes   Depression: No   CKD:no     Wt Readings from Last 3 Encounters:   02/04/20 166 lb (75.3 kg)   10/04/19 168 lb (76.2 kg)   07/12/19 168 lb (76.2 kg)        Social History     Tobacco Use   Smoking Status Former Smoker    Packs/day: 1.00    Years: 15.00    Pack years: 15.00   Smokeless Tobacco Never Used     Body mass index is 24.51 kg/m². Diabetic Consultants: All the patient's questions regarding medications, diet and exercise were answered. Goal of A1C of less than 7.5% is our goal.   Our overall goal is to reduce or eliminate the long term consequences of poorly controlled diabetes. Yes    Vertigo: This is a new problem. Has been present for 2 weeks. It is worse when he turns over in bed. It is truly a spinning dizziness. He has had no falls. He has had no other illnesses. He has never had this before.  Essential hypertension:  BP Readings from Last 3 Encounters:   02/04/20 126/78   10/04/19 113/67   07/12/19 111/70     The patient reports:  taking medications as instructed, no medication side effects noted, no TIA's, no chest pain on exertion, no dyspnea on exertion, no swelling of ankles. Key CAD CHF Meds             enalapril (VASOTEC) 10 mg tablet (Taking) TAKE 1 TABLET BY MOUTH ONCE DAILY    simvastatin (ZOCOR) 20 mg tablet (Taking) TAKE 1 TABLET NIGHTLY    hydroCHLOROthiazide (HYDRODIURIL) 25 mg tablet (Taking) TAKE 1 TABLET BY MOUTH ONCE DAILY    aspirin 81 mg Tab (Taking) Take 81 mg by mouth daily.            Lab Results   Component Value Date/Time    Sodium 135 07/12/2019 09:13 AM    Potassium 4.2 07/12/2019 09:13 AM    Chloride 96 07/12/2019 09:13 AM    CO2 28 07/12/2019 09:13 AM    Anion gap 8 04/10/2017 06:58 AM    Glucose 91 07/12/2019 09:13 AM    BUN 8 (L) 07/12/2019 09:13 AM    Creatinine 0.89 07/12/2019 09:13 AM    BUN/Creatinine ratio 9 (L) 07/12/2019 09:13 AM    GFR est AA 86 07/12/2019 09:13 AM    GFR est non-AA 75 07/12/2019 09:13 AM    Calcium 9.6 07/12/2019 09:13 AM    Bilirubin, total 1.0 07/12/2019 09:13 AM    AST (SGOT) 20 07/12/2019 09:13 AM    Alk. phosphatase 56 07/12/2019 09:13 AM    Protein, total 7.8 07/12/2019 09:13 AM    Albumin 4.4 07/12/2019 09:13 AM    Globulin 3.8 04/10/2017 06:58 AM    A-G Ratio 1.3 07/12/2019 09:13 AM    ALT (SGPT) 11 07/12/2019 09:13 AM     Low salt diet? yes  Aerobic exercise? no  Our goal is to normalize the blood pressure to decrease the risks of strokes and heart attacks. The patient is in agreement with the plan.  Pure hypercholesterolemia:  Cardiovascular risks for him are: LDL goal is under 80  diabetic  hypertension  hyperlipidemia. Key Antihyperlipidemia Meds             simvastatin (ZOCOR) 20 mg tablet (Taking) TAKE 1 TABLET NIGHTLY        Lab Results   Component Value Date/Time    Cholesterol, total 127 07/12/2019 09:13 AM    HDL Cholesterol 40 07/12/2019 09:13 AM    LDL, calculated 65 07/12/2019 09:13 AM    Triglyceride 111 07/12/2019 09:13 AM    CHOL/HDL Ratio 3.1 07/15/2010 08:52 AM     Lab Results   Component Value Date/Time    ALT (SGPT) 11 07/12/2019 09:13 AM    AST (SGOT) 20 07/12/2019 09:13 AM    Alk. phosphatase 56 07/12/2019 09:13 AM    Bilirubin, total 1.0 07/12/2019 09:13 AM      Myalgias: No   Fatigue: No   Other side effects: no  Wt Readings from Last 3 Encounters:   02/04/20 166 lb (75.3 kg)   10/04/19 168 lb (76.2 kg)   07/12/19 168 lb (76.2 kg)     The patient is aware of our goal to reduce or eliminate the long term problems (such as strokes and heart attacks) related to poorly controlled hyperlipidemia.            Prostate cancer [facility]): This was diagnosed 9 years ago. He has no residual symptoms. Current and past medical information:    Current Medications after this visit[de-identified]     Current Outpatient Medications   Medication Sig    meclizine (ANTIVERT) 12.5 mg tablet Take 1 Tab by mouth three (3) times daily as needed for Dizziness for up to 10 days.  enalapril (VASOTEC) 10 mg tablet TAKE 1 TABLET BY MOUTH ONCE DAILY    simvastatin (ZOCOR) 20 mg tablet TAKE 1 TABLET NIGHTLY    hydroCHLOROthiazide (HYDRODIURIL) 25 mg tablet TAKE 1 TABLET BY MOUTH ONCE DAILY    varicella-zoster gE vac,2 of 2 50 mcg susr Shingrix, one injection now and repeat in 4-6 months. To be administered at Pharmacy. Fax confirmation to me at [phone].  triamcinolone acetonide (KENALOG) 0.1 % topical cream Apply  to affected area two (2) times a day. use thin layer    lancets (TRUEPLUS LANCETS) 33 gauge misc Use as directed to check blood sugar once daily. Dx:E11.9 Please dispense appropriate product    glucose blood VI test strips (TRUETEST TEST STRIPS) strip Use as directed to check blood sugar once daily. Dx:E11.9    Blood-Glucose Meter (TRUE METRIX GLUCOSE METER) misc Use as directed to check blood sugar once daily. Dx:E11.9 Please dispense appropriate product    glucose blood VI test strips (TRUE METRIX GLUCOSE TEST STRIP) strip Use as directed to check blood sugar once daily. Dx:E11.9 Please dispense appropriate product    acetaminophen (TYLENOL) 650 mg CR tablet Take 650 mg by mouth nightly.  tamsulosin (FLOMAX) 0.4 mg capsule Take 0.4 mg by mouth daily (after dinner).  cholecalciferol (VITAMIN D-3) 400 unit Tab tablet Take 400 Units by mouth daily.  aspirin 81 mg Tab Take 81 mg by mouth daily.  finasteride (PROSCAR) 5 mg tablet Take 5 mg by mouth daily (after dinner). No current facility-administered medications for this visit.         Patient Active Problem List    Diagnosis Date Noted    Type 2 diabetes mellitus with diabetic neuropathy ([facility] [250.60, 357.2] 08/05/2015     Priority: 1 - One    Prostate cancer ([facility] 01/27/2011     Priority: 1 - One    Colon polyp 11/16/2010     Priority: 1 - One    Hyperlipidemia 11/16/2010     Priority: 1 - One    Hypertension      Priority: 1 - One    Erythrocytosis      Priority: 1 - One    Erectile dysfunction      Priority: 5 - Five    Hyperkeratosis 11/16/2010       Past Medical History:   Diagnosis Date    Allergic rhinitis     Benign prostatic hypertrophy     Erectile dysfunction     Erythrocytosis     Hypertension     Polyp, colon        No Known Allergies    Past Surgical History:   Procedure Laterality Date    COLONOSCOPY N/A 4/10/2017    COLONOSCOPY performed by Piper Savage MD at [address]  4/10/2017            Social History     Socioeconomic History    Marital status:      Spouse name: Not on file    Number of children: Not on file    Years of education: Not on file    Highest education level: Not on file   Tobacco Use    Smoking status: Former Smoker     Packs/day: 1.00     Years: 15.00     Pack years: 15.00    Smokeless tobacco: Never Used   Substance and Sexual Activity    Alcohol use: No    Drug use: No       Review of Systems   Constitutional: Negative. Negative for chills, fever, malaise/fatigue and weight loss. HENT: Negative. Negative for hearing loss. Eyes: Negative. Negative for blurred vision and double vision. Respiratory: Negative. Negative for cough, sputum production and shortness of breath. Cardiovascular: Negative. Negative for chest pain and palpitations. Gastrointestinal: Negative. Negative for abdominal pain, blood in stool, heartburn, nausea and vomiting. Genitourinary: Negative. Negative for dysuria, frequency and urgency. Musculoskeletal: Negative. Negative for back pain, falls, myalgias and neck pain. Skin: Negative. Negative for rash. Neurological: Positive for dizziness. Negative for tingling, tremors, weakness and headaches. Spinning dizziness. Worse when he rolls over in bed. Endo/Heme/Allergies: Negative. Psychiatric/Behavioral: Negative. Negative for depression. Objective:     Vitals:    02/04/20 0809 02/04/20 0810   BP: 107/72 126/78   Pulse: 73    Resp: 18    Temp: 97.6 °F (36.4 °C)    TempSrc: Oral    SpO2: 98%    Weight: 166 lb (75.3 kg)    Height: 5' 9\" (1.753 m)       Body mass index is 24.51 kg/m². Physical Exam  Vitals signs and nursing note reviewed. Constitutional:       General: He is not in acute distress. Appearance: Normal appearance. He is well-developed. He is not toxic-appearing. HENT:      Head: Normocephalic and atraumatic. Right Ear: Tympanic membrane normal.      Left Ear: Tympanic membrane normal.      Ears:      Comments: Felch-Hallpike maneuver was negative. Nose: No congestion. Mouth/Throat:      Pharynx: No oropharyngeal exudate or posterior oropharyngeal erythema. Eyes:      General: No scleral icterus. Right eye: No discharge. Left eye: No discharge. Neck:      Comments: No bruit. Cardiovascular:      Rate and Rhythm: Normal rate and regular rhythm. Heart sounds: Normal heart sounds. No murmur. No friction rub. No gallop. Pulmonary:      Effort: Pulmonary effort is normal. No respiratory distress. Breath sounds: Normal breath sounds. No wheezing, rhonchi or rales. Abdominal:      General: There is no distension. Musculoskeletal:         General: No swelling. Skin:     General: Skin is warm. Coloration: Skin is not jaundiced. Findings: No erythema or rash. Neurological:      Mental Status: He is alert.            Health Maintenance Due   Topic Date Due    MEDICARE YEARLY EXAM  03/24/2019    MICROALBUMIN Q1  10/24/2019    HEMOGLOBIN A1C Q6M  01/12/2020    FOOT EXAM Q1  02/26/2020         Assessment and orders: Encounter Diagnoses     ICD-10-CM ICD-9-CM   1. Type 2 diabetes mellitus with diabetic neuropathy, without long-term current use of insulin (HCC) E11.40 250.60     357.2   2. Vertigo R42 780.4   3. Essential hypertension I10 401.9   4. Pure hypercholesterolemia E78.00 272.0   5. Prostate cancer ([facility] [address] C61 185     Diagnoses and all orders for this visit:    1. Type 2 diabetes mellitus with diabetic neuropathy, without long-term current use of insulin (HCC)  -     HEMOGLOBIN A1C WITH EAG; Future  -     MICROALBUMIN, UR, RAND W/ MICROALB/CREAT RATIO; Future  -     LIPID PANEL; Future  -     METABOLIC PANEL, COMPREHENSIVE; Future    2. Vertigo-new onset. [place]-Hallpike was negative today. -     meclizine (ANTIVERT) 12.5 mg tablet; Take 1 Tab by mouth three (3) times daily as needed for Dizziness for up to 10 days.  -     HEMOGLOBIN A1C WITH EAG; Future  -     LIPID PANEL; Future  -     METABOLIC PANEL, COMPREHENSIVE; Future  -     CBC W/O DIFF; Future    3. Essential hypertension  -     MICROALBUMIN, UR, RAND W/ MICROALB/CREAT RATIO; Future  -     LIPID PANEL; Future  -     METABOLIC PANEL, COMPREHENSIVE; Future    4. Pure hypercholesterolemia  -     LIPID PANEL; Future  -     METABOLIC PANEL, COMPREHENSIVE; Future    5. Prostate cancer [facility])                Plan of care:  Discussed diagnoses in detail with patient. Medication risks/benefits/side effects discussed with patient. All of the patient's questions were addressed. The patient understands and agrees with our plan of care. The patient knows to call back if they are unsure of or forget any changes we discussed today or if the symptoms change. The patient received an After-Visit Summary which contains VS, orders, medication list and allergy list. This can be used as a \"mini-medical record\" should they have to seek medical care while out of town.     Patient Care Team:  Rashard Murphy MD as PCP - Abril Santos MD as PCP - [facility] Empaneled Provider  Maribel Sanchez MD (Ophthalmology)    Follow-up and Dispositions    · Return in about 2 weeks (around 2/18/2020) for MWE. Signed By: Roscoe Barahona MD     February 4, 2020      ATTENTION:   This medical record was transcribed using an electronic medical records/speech recognition system. Although proofread, it may and can contain electronic, spelling and other errors. Corrections may be executed at a later time. Please feel free to contact me for any clarifications as needed.

## 2020-02-04 NOTE — PROGRESS NOTES
1. Have you been to the ER, urgent care clinic since your last visit? Hospitalized since your last visit? No    2. Have you seen or consulted any other health care providers outside of the [address] [place] [address] since your last visit? Include any pap smears or colon screening. No    Reviewed record in preparation for visit and have necessary documentation  Goals that were addressed and/or need to be completed during or after this appointment include     Health Maintenance Due   Topic Date Due    Shingrix Vaccine Age 49> (1 of 2) [birth year]/1977    Pneumococcal 65+ years (2 of 2 - PPSV23) 08/05/2016    MEDICARE YEARLY EXAM  03/24/2019    MICROALBUMIN Q1  10/24/2019    HEMOGLOBIN A1C Q6M  01/12/2020    FOOT EXAM Q1  02/26/2020       Patient is accompanied by self I have received verbal consent from Kendra Hammonds to discuss any/all medical information while they are present in the room.

## 2020-02-05 DIAGNOSIS — D75.89 MACROCYTOSIS: Primary | ICD-10-CM

## 2020-02-05 DIAGNOSIS — K92.2 LOWER GI BLEED: ICD-10-CM

## 2020-02-05 LAB
ALBUMIN SERPL-MCNC: 3.9 G/DL (ref 3.5–5)
ALBUMIN/GLOB SERPL: 1 {RATIO} (ref 1.1–2.2)
ALP SERPL-CCNC: 62 U/L (ref 45–117)
ALT SERPL-CCNC: 17 U/L (ref 12–78)
ANION GAP SERPL CALC-SCNC: 3 MMOL/L (ref 5–15)
AST SERPL-CCNC: 16 U/L (ref 15–37)
BILIRUB SERPL-MCNC: 1.1 MG/DL (ref 0.2–1)
BUN SERPL-MCNC: 12 MG/DL (ref 6–20)
BUN/CREAT SERPL: 12 (ref 12–20)
CALCIUM SERPL-MCNC: 9.3 MG/DL (ref 8.5–10.1)
CHLORIDE SERPL-SCNC: 102 MMOL/L (ref 97–108)
CHOLEST SERPL-MCNC: 134 MG/DL
CO2 SERPL-SCNC: 31 MMOL/L (ref 21–32)
CREAT SERPL-MCNC: 1.03 MG/DL (ref 0.7–1.3)
CREAT UR-MCNC: 154 MG/DL
EST. AVERAGE GLUCOSE BLD GHB EST-MCNC: 126 MG/DL
GLOBULIN SER CALC-MCNC: 4 G/DL (ref 2–4)
GLUCOSE SERPL-MCNC: 101 MG/DL (ref 65–100)
HBA1C MFR BLD: 6 % (ref 4–5.6)
HDLC SERPL-MCNC: 53 MG/DL
HDLC SERPL: 2.5 {RATIO} (ref 0–5)
LDLC SERPL CALC-MCNC: 67.8 MG/DL (ref 0–100)
LIPID PROFILE,FLP: NORMAL
MICROALBUMIN UR-MCNC: 0.76 MG/DL
MICROALBUMIN/CREAT UR-RTO: 5 MG/G (ref 0–30)
POTASSIUM SERPL-SCNC: 4.2 MMOL/L (ref 3.5–5.1)
PROT SERPL-MCNC: 7.9 G/DL (ref 6.4–8.2)
SODIUM SERPL-SCNC: 136 MMOL/L (ref 136–145)
TRIGL SERPL-MCNC: 66 MG/DL (ref ?–150)
VLDLC SERPL CALC-MCNC: 13.2 MG/DL

## 2020-02-06 ENCOUNTER — TELEPHONE (OUTPATIENT)
Dept: FAMILY MEDICINE CLINIC | Age: 85
End: 2020-02-06

## 2020-02-06 LAB — VIT B12 SERPL-MCNC: 303 PG/ML (ref 193–986)

## 2020-02-06 NOTE — TELEPHONE ENCOUNTER
Called and spoke to patient. Advised him per Dr. Dominic Desir:    With exception of a low vitamin B12 your labs are acceptable. Vitamin B12 deficiencies can lead to memory loss, numbness and tingling in the extremities and even anemia if it is severe and has been going on long enough.     Please get vitamin B-12 rapid dissolving 5000 MCG from over-the-counter at the drugstore and take 1 every day. Let it dissolve in your mouth.       He verbalized understanding.

## 2020-02-19 ENCOUNTER — OFFICE VISIT (OUTPATIENT)
Dept: FAMILY MEDICINE CLINIC | Age: 85
End: 2020-02-19

## 2020-02-19 VITALS
SYSTOLIC BLOOD PRESSURE: 97 MMHG | OXYGEN SATURATION: 100 % | HEIGHT: 69 IN | DIASTOLIC BLOOD PRESSURE: 63 MMHG | TEMPERATURE: 97.5 F | HEART RATE: 69 BPM | BODY MASS INDEX: 25.33 KG/M2 | RESPIRATION RATE: 18 BRPM | WEIGHT: 171 LBS

## 2020-02-19 DIAGNOSIS — Z00.00 MEDICARE ANNUAL WELLNESS VISIT, SUBSEQUENT: Primary | ICD-10-CM

## 2020-02-19 RX ORDER — ACETAMINOPHEN, DIPHENHYDRAMINE HCL, PHENYLEPHRINE HCL 325; 25; 5 MG/1; MG/1; MG/1
TABLET ORAL
COMMUNITY

## 2020-02-19 NOTE — PROGRESS NOTES
Chief Complaint   Patient presents with    Annual Wellness Visit     Body mass index is 25.25 kg/m². 1. Have you been to the ER, urgent care clinic since your last visit? Hospitalized since your last visit?no    2. Have you seen or consulted any other health care providers outside of the [facility] since your last visit? Include any pap smears or colon screening.  no    Reviewed record in preparation for visit and have necessary documentation  Pt did not bring medication to office visit for review  Information was given to pt on Advanced Directives, Living Will  Information was given on Shingles Vaccine  Opportunity was given for questions  Goals that were addressed and/or need to be completed after this appointment include:     Health Maintenance Due   Topic Date Due    Medicare Yearly Exam  03/24/2019    Foot Exam Q1  02/26/2020

## 2020-02-19 NOTE — PROGRESS NOTES
[facility]  [facility], [address] [place] [address]             Date of visit: 2/19/2020       This is a Subsequent Medicare Annual Wellness Visit (AWV), (Performed more than 12 months after effective date of Medicare Part B enrollment and 12 months after last wellness visit)    I have reviewed the patient's medical history in detail and updated the computerized patient record. Madison Pastrana is a 80 y.o. male   History obtained from: the patient. Concerns today   (Patient understands that medical problems addressed today may incur additional notes andcost as this is a preventive visit)      History     Patient Active Problem List   Diagnosis Code    Hypertension I10    Erythrocytosis D75.1    Erectile dysfunction N52.9    Colon polyp K63.5    Hyperkeratosis L85.9    Hyperlipidemia E78.5    Prostate cancer ([facility] C61    Type 2 diabetes mellitus with diabetic neuropathy ([facility] [250.60, 357.2] E11.40     Past Medical History:   Diagnosis Date    Allergic rhinitis     Benign prostatic hypertrophy     Erectile dysfunction     Erythrocytosis     Hypertension     Polyp, colon       Past Surgical History:   Procedure Laterality Date    COLONOSCOPY N/A 4/10/2017    COLONOSCOPY performed by Opal Lagunas MD at [address]  4/10/2017          No Known Allergies  Current Outpatient Medications   Medication Sig Dispense Refill    cyanocobalamin, vitamin B-12, 5,000 mcg subl by SubLINGual route.  enalapril (VASOTEC) 10 mg tablet TAKE 1 TABLET BY MOUTH ONCE DAILY 30 Tab 6    simvastatin (ZOCOR) 20 mg tablet TAKE 1 TABLET NIGHTLY 90 Tab 2    hydroCHLOROthiazide (HYDRODIURIL) 25 mg tablet TAKE 1 TABLET BY MOUTH ONCE DAILY 90 Tab 3    varicella-zoster gE vac,2 of 2 50 mcg susr Shingrix, one injection now and repeat in 4-6 months. To be administered at Pharmacy.  Fax confirmation to me at [phone]. 2 Each 0    triamcinolone acetonide (KENALOG) 0.1 % topical cream Apply  to affected area two (2) times a day. use thin layer 85.2 g 4    lancets (TRUEPLUS LANCETS) 33 gauge misc Use as directed to check blood sugar once daily. Dx:E11.9 Please dispense appropriate product 100 Lancet 4    glucose blood VI test strips (TRUETEST TEST STRIPS) strip Use as directed to check blood sugar once daily. Dx:E11.9 50 Strip 5    Blood-Glucose Meter (TRUE METRIX GLUCOSE METER) misc Use as directed to check blood sugar once daily. Dx:E11.9 Please dispense appropriate product 1 Each 0    glucose blood VI test strips (TRUE METRIX GLUCOSE TEST STRIP) strip Use as directed to check blood sugar once daily. Dx:E11.9 Please dispense appropriate product 50 Strip 5    acetaminophen (TYLENOL) 650 mg CR tablet Take 650 mg by mouth nightly.  tamsulosin (FLOMAX) 0.4 mg capsule Take 0.4 mg by mouth daily (after dinner).  finasteride (PROSCAR) 5 mg tablet Take 5 mg by mouth daily (after dinner).  cholecalciferol (VITAMIN D-3) 400 unit Tab tablet Take 400 Units by mouth daily.  aspirin 81 mg Tab Take 81 mg by mouth daily. Family History   Problem Relation Age of Onset    Cancer Mother     Cancer Brother         lung    Heart Disease Brother      Social History     Tobacco Use    Smoking status: Former Smoker     Packs/day: 1.00     Years: 15.00     Pack years: 15.00    Smokeless tobacco: Never Used   Substance Use Topics    Alcohol use: No       Specialists/Care Team   [employer]. Jonathan Gould has established care with the following healthcare providers:  Patient Care Team:  Scarlett Ramos MD as PCP - Sahra Richter MD as PCP - [facility] Provider  Karina Forbes MD (Ophthalmology)      Health Risk Assessment     Demographics   male  80 y.o.     General Health Questions   -During the past 4 weeks:   -how would you rate your health in general? Good   -how often have you been bothered by feeling dizzy when standing up? occasionally   -how much have you been bothered by bodily pain? not   -Have you noticed any hearing difficulties? no   -has your physical and emotional health limited your social activities with family or friends? no    Emotional Health Questions   -Do you have a history of depression, anxiety, or emotional problems? no  -Over the past 2 weeks, have you felt down, depressed or hopeless? no  -Over the past 2 weeks, have you felt little interest or pleasure in doing things? no    Health Habits   Please describe your diet habits: Self grade B  Do you get 5 servings of fruits or vegetables daily? no  Do you exercise regularly? no    Alcohol Risk Factor Screening: On any occasion during the past 3 months, have you had more than 4 drinks containing alcohol? No   Do you average more than 14 drinks per week? No     Activities of Daily Living and Functional Status   -Do you need help with eating, walking, dressing, bathing, toileting, the phone, transportation, shopping, preparing meals, housework, laundry, medications or managing money? no  -In the past four weeks, was someone available to help you if you needed and wanted help with anything? yes  -Are you confident are you that you can control and manage most of your health problems? yes  -Have you been given information to help you keep track of your medications? yes  -How often do you have trouble taking your medications as prescribed? never    Fall Risk and Home Safety   Have you fallen 2 or more times in the past year? no  Does your home have rugs in the hallway, lack grab bars in the bathroom, lack handrails on the stairs or have poor lighting? no  Do you have smoke detectors and check them regularly? yes  Do you have difficulties driving a car? no  Do you always fasten your seat belt when you are in a car? yes    Review of Systems (if indicated for problems addressed today)   Review of Systems   Constitutional: Negative.   Negative for chills, fever, malaise/fatigue and weight loss. Delightful, Very active 79 yo. HENT: Negative. Negative for hearing loss. Eyes: Negative. Negative for blurred vision and double vision. Respiratory: Negative. Negative for cough, sputum production and shortness of breath. Cardiovascular: Negative. Negative for chest pain and palpitations. Gastrointestinal: Negative. Negative for abdominal pain, blood in stool, heartburn, nausea and vomiting. Genitourinary: Negative. Negative for dysuria, frequency and urgency. Musculoskeletal: Negative. Negative for back pain, falls, myalgias and neck pain. Skin: Negative. Negative for rash. Neurological: Negative. Negative for dizziness, tingling, tremors, weakness and headaches. Endo/Heme/Allergies: Negative. Psychiatric/Behavioral: Negative. Negative for depression. Physical Examination     Wt Readings from Last 3 Encounters:   02/19/20 171 lb (77.6 kg)   02/04/20 166 lb (75.3 kg)   10/04/19 168 lb (76.2 kg)     Temp Readings from Last 3 Encounters:   02/19/20 97.5 °F (36.4 °C) (Oral)   02/04/20 97.6 °F (36.4 °C) (Oral)   10/04/19 98.4 °F (36.9 °C) (Oral)     BP Readings from Last 3 Encounters:   02/19/20 97/63   02/04/20 126/78   10/04/19 113/67     Pulse Readings from Last 3 Encounters:   02/19/20 69   02/04/20 73   10/04/19 76       Body mass index is 25.25 kg/m². No exam data present  Was the patient's timed Up & Go test unsteady or longer than 10 seconds? no    Evaluation of Cognitive Function   Mood/affect:  happy, sad, manic  Orientation: Person, Place, Time and Situation  Appearance: age appropriate and casually dressed  Family member/caregiver input: no    Additional exam if indicated for problems addressed today:  Physical Exam  Constitutional:       General: He is not in acute distress. Appearance: He is well-developed. He is not diaphoretic. HENT:      Head: Normocephalic and atraumatic.    Eyes:      General: No scleral icterus. Right eye: No discharge. Left eye: No discharge. Conjunctiva/sclera: Conjunctivae normal.   Neck:      Thyroid: No thyromegaly. Comments: No carotid bruit. Cardiovascular:      Rate and Rhythm: Normal rate and regular rhythm. Heart sounds: Normal heart sounds. No murmur. No friction rub. No gallop. Pulmonary:      Effort: Pulmonary effort is normal. No respiratory distress. Breath sounds: Normal breath sounds. No wheezing or rales. Chest:      Chest wall: No tenderness. Abdominal:      General: Bowel sounds are normal. There is no distension. Palpations: Abdomen is soft. There is no mass. Tenderness: There is no abdominal tenderness. There is no guarding. Musculoskeletal:         General: No tenderness. Lymphadenopathy:      Cervical: No cervical adenopathy. Skin:     General: Skin is warm and dry. Coloration: Skin is not pale. Findings: No erythema or rash. Neurological:      Mental Status: He is alert and oriented to person, place, and time. Cranial Nerves: No cranial nerve deficit.    Psychiatric:         Behavior: Behavior normal.           Advice/Referrals/Counseling (as indicated)       Advance Care Planning 4/9/2017   Patient's Healthcare Decision Maker is: Named in scanned ACP document   Confirm Advance Directive None         Preventive Services     (Preventive care checklist to be included in patient instructions)  Discussed today Done Previously Not Needed     x  Pneumococcal vaccines    x  Flu vaccine     x Hepatitis B vaccine (if at risk)   x   Shingles vaccine    x  TDAP vaccine    x  Digital rectal exam    x  PSA    x  Colorectal cancer screening     x Low-dose CT for lung cancer screening     x Bone density test    x  Glaucoma screening    x  Cholesterol test     x Diabetes screening test      x Diabetes self-management class      Nutritionist referral for diabetes or renal disease     Discussion of Advance Directive Discussed with Gonzalez Melvin. Peggy Castillo his ability to prepare and advance directive in the case that an injury or illness causes him to be unable to make health care decisions. Assessment/Plan   Z00.00    ICD-10-CM ICD-9-CM    1.  Medicare annual wellness visit, subsequent Z00.00 V70.0        Orders Placed This Encounter    cyanocobalamin, vitamin B-12, 5,000 mcg subl       Patient Care Team:  Tere Navarrete MD as PCP - Alli Bailey MD as PCP - [facility] Empaneled Provider  Andrew Flores MD (Ophthalmology)        Future Appointments   Date Time Provider Fatou Lopez   3/23/2020  1:50 PM MD Johnathon Jim MD

## 2020-07-21 ENCOUNTER — VIRTUAL VISIT (OUTPATIENT)
Dept: FAMILY MEDICINE CLINIC | Age: 85
End: 2020-07-21

## 2020-07-21 DIAGNOSIS — E78.00 PURE HYPERCHOLESTEROLEMIA: Chronic | ICD-10-CM

## 2020-07-21 DIAGNOSIS — E11.40 TYPE 2 DIABETES MELLITUS WITH DIABETIC NEUROPATHY, WITHOUT LONG-TERM CURRENT USE OF INSULIN (HCC): Primary | Chronic | ICD-10-CM

## 2020-07-21 DIAGNOSIS — E53.8 B12 DEFICIENCY: Chronic | ICD-10-CM

## 2020-07-21 DIAGNOSIS — D75.1 ERYTHROCYTOSIS: Chronic | ICD-10-CM

## 2020-07-21 DIAGNOSIS — I10 ESSENTIAL HYPERTENSION: Chronic | ICD-10-CM

## 2020-07-21 NOTE — PROGRESS NOTES
[address]  [phone]           Progress Note    Patient: Debbie Pinto MRN: [id number]  SSN: [id number]    YOB: 1927  Age: 80 y.o. Sex: male        Chief Complaint   Patient presents with    Diabetes         Debbie Pinto is a 80 y.o. male evaluated via telephone on 7/21/2020. Nurse involved in care:Nurse [address] [place] [address]  Location of patient:Home  Location of physician:My office    Consent:  He and/or health care decision maker is aware that that he may receive a bill for this telephone service, depending on his insurance coverage, and has provided verbal consent to proceed: Yes      Documentation:  I communicated with the patient and/or health care decision maker about multiple problems. Details of this discussion including any medical advice provided: See Below. I affirm this is a Patient Initiated Episode with an Established Patient who has not had a related appointment within my department in the past 7 days or scheduled within the next 24 hours. Total Time: minutes: 11-20 minutes    Note: not billable if this call serves to triage the patient into an appointment for the relevant concern      Kenzie Raphael MD   __________________________________________________________________________________________    Subjective:     Encounter Diagnoses   Name Primary?  Type 2 diabetes mellitus with diabetic neuropathy, without long-term current use of insulin (HCC) Yes    Essential hypertension     Erythrocytosis     Pure hypercholesterolemia     B12 deficiency              Current and past medical information:    Current Medications after this visit[de-identified]     Current Outpatient Medications   Medication Sig    cyanocobalamin, vitamin B-12, 5,000 mcg subl by SubLINGual route.     enalapril (VASOTEC) 10 mg tablet TAKE 1 TABLET BY MOUTH ONCE DAILY    simvastatin (ZOCOR) 20 mg tablet TAKE 1 TABLET NIGHTLY  hydroCHLOROthiazide (HYDRODIURIL) 25 mg tablet TAKE 1 TABLET BY MOUTH ONCE DAILY    triamcinolone acetonide (KENALOG) 0.1 % topical cream Apply  to affected area two (2) times a day. use thin layer    acetaminophen (TYLENOL) 650 mg CR tablet Take 650 mg by mouth nightly.  tamsulosin (FLOMAX) 0.4 mg capsule Take 0.4 mg by mouth daily (after dinner).  finasteride (PROSCAR) 5 mg tablet Take 5 mg by mouth daily (after dinner).  cholecalciferol (VITAMIN D-3) 400 unit Tab tablet Take 400 Units by mouth daily.  aspirin 81 mg Tab Take 81 mg by mouth daily.  varicella-zoster gE vac,2 of 2 50 mcg susr Shingrix, one injection now and repeat in 4-6 months. To be administered at Pharmacy. Fax confirmation to me at [phone].  lancets (TRUEPLUS LANCETS) 33 gauge misc Use as directed to check blood sugar once daily. Dx:E11.9 Please dispense appropriate product    glucose blood VI test strips (TRUETEST TEST STRIPS) strip Use as directed to check blood sugar once daily. Dx:E11.9    Blood-Glucose Meter (TRUE METRIX GLUCOSE METER) misc Use as directed to check blood sugar once daily. Dx:E11.9 Please dispense appropriate product    glucose blood VI test strips (TRUE METRIX GLUCOSE TEST STRIP) strip Use as directed to check blood sugar once daily. Dx:E11.9 Please dispense appropriate product     No current facility-administered medications for this visit.         Patient Active Problem List    Diagnosis Date Noted    [facility] [250.60, 357.2] 08/05/2015     Priority: 1 - One    [facility] 01/27/2011     Priority: 1 - One    Colon polyp 11/16/2010     Priority: 1 - One    Hyperlipidemia 11/16/2010     Priority: 1 - One    Hypertension      Priority: 1 - One    Erythrocytosis      Priority: 1 - One    Erectile dysfunction      Priority: 5 - Five    Hyperkeratosis 11/16/2010       Past Medical History:   Diagnosis Date    Allergic rhinitis     Benign prostatic hypertrophy     Erectile dysfunction     Erythrocytosis     Hypertension     Polyp, colon        No Known Allergies    Past Surgical History:   Procedure Laterality Date    COLONOSCOPY N/A 4/10/2017    COLONOSCOPY performed by Chuy Valadez MD at [facility]  4/10/2017            Social History     Socioeconomic History    Marital status:      Spouse name: Not on file    Number of children: Not on file    Years of education: Not on file    Highest education level: Not on file   Tobacco Use    Smoking status: Former Smoker     Packs/day: 1.00     Years: 15.00     Pack years: 15.00    Smokeless tobacco: Never Used   Substance and Sexual Activity    Alcohol use: No    Drug use: No       Review of Systems   Constitutional: Negative. Negative for chills, fever, malaise/fatigue and weight loss. Reports blood sugars in the 120 range. Overall he feels excellent. Memory is good. Does not feel any differently since starting B12. HENT: Negative. Negative for hearing loss. Eyes: Negative. Negative for blurred vision and double vision. Respiratory: Negative. Negative for cough, sputum production and shortness of breath. Cardiovascular: Negative. Negative for chest pain and palpitations. Gastrointestinal: Negative. Negative for abdominal pain, blood in stool, heartburn, nausea and vomiting. Genitourinary: Negative. Negative for dysuria, frequency and urgency. Musculoskeletal: Negative. Negative for back pain, falls, myalgias and neck pain. Skin: Negative. Negative for rash. Neurological: Negative. Negative for dizziness, tingling, tremors, weakness and headaches. Endo/Heme/Allergies: Negative. Psychiatric/Behavioral: Negative. Negative for depression. Objective: There were no vitals filed for this visit. There is no height or weight on file to calculate BMI.       Health Maintenance Due   Topic Date Due    Pneumococcal 65+ years (2 of 2 - PPSV23) 08/05/2016    Foot Exam Q1  02/26/2020         Assessment and orders:     Encounter Diagnoses     ICD-10-CM ICD-9-CM   1. Type 2 diabetes mellitus with diabetic neuropathy, without long-term current use of insulin (HCC)  E11.40 250.60     357.2   2. Essential hypertension  I10 401.9   3. Erythrocytosis  D75.1 289.0   4. Pure hypercholesterolemia  E78.00 272.0   5. B12 deficiency  E53.8 266.2     Diagnoses and all orders for this visit:    1. Type 2 diabetes mellitus with diabetic neuropathy, without long-term current use of insulin (HCC)--retest lab  -     HEMOGLOBIN A1C WITH EAG; Future  -     LIPID PANEL; Future  -     METABOLIC PANEL, COMPREHENSIVE; Future    2. Essential hypertension-controlled  -     LIPID PANEL; Future  -     METABOLIC PANEL, COMPREHENSIVE; Future    3. Erythrocytosis-he wants to postpone his visit with Dr. Graciela Verma. That will give me time to get his lab work back  -     [address] DIFF; Future    4. Pure hypercholesterolemia-retest  -     LIPID PANEL; Future  -     METABOLIC PANEL, COMPREHENSIVE; Future    5. B12 deficiency-retest  -     VITAMIN B12; Future            Plan of care:  Discussed diagnoses in detail with patient. Medication risks/benefits/side effects discussed with patient. All of the patient's questions were addressed. The patient understands and agrees with our plan of care. The patient knows to call back if they are unsure of or forget any changes we discussed today or if the symptoms change. The patient received an After-Visit Summary which contains VS, orders, medication list and allergy list. This can be used as a \"mini-medical record\" should they have to seek medical care while out of town.     Patient Care Team:  Edgard Barraza MD as PCP - Delilah Rodriguez MD as PCP - [facility]  Dimitri Carvalho MD (Ophthalmology)          Signed By: Josr Aalniz MD     July 21, 2020      ATTENTION:   This medical record was transcribed using an electronic medical records/speech recognition system. Although proofread, it may and can contain electronic, spelling and other errors. Corrections may be executed at a later time. Please feel free to contact me for any clarifications as needed.

## 2020-07-21 NOTE — PROGRESS NOTES
1. Have you been to the ER, urgent care clinic, or been hospitalized since your last visit? No     2. Have you seen or consulted any other health care providers outside of the [address] [place] [address] since your last visit? No     Reviewed record in preparation for visit and have necessary documentation  Goals that were addressed and/or need to be completed during or after this appointment include   Health Maintenance Due   Topic Date Due    Pneumococcal 65+ years (2 of 2 - PPSV23) 08/05/2016    Foot Exam Q1  02/26/2020       I have received verbal consent from Yolanda Moses to discuss any/all medical information while others present in the room.

## 2020-07-22 ENCOUNTER — HOSPITAL ENCOUNTER (OUTPATIENT)
Dept: LAB | Age: 85
Discharge: HOME | End: 2020-07-22

## 2020-07-22 DIAGNOSIS — I10 ESSENTIAL HYPERTENSION: Chronic | ICD-10-CM

## 2020-07-22 DIAGNOSIS — D75.1 ERYTHROCYTOSIS: Chronic | ICD-10-CM

## 2020-07-22 DIAGNOSIS — E11.40 TYPE 2 DIABETES MELLITUS WITH DIABETIC NEUROPATHY, WITHOUT LONG-TERM CURRENT USE OF INSULIN (HCC): Chronic | ICD-10-CM

## 2020-07-22 DIAGNOSIS — E78.00 PURE HYPERCHOLESTEROLEMIA: Chronic | ICD-10-CM

## 2020-07-22 DIAGNOSIS — E53.8 B12 DEFICIENCY: Chronic | ICD-10-CM

## 2020-07-22 LAB
ALBUMIN SERPL-MCNC: 3.7 G/DL (ref 3.5–5)
ALBUMIN/GLOB SERPL: 0.9 {RATIO} (ref 1.1–2.2)
ALP SERPL-CCNC: 52 U/L (ref 45–117)
ALT SERPL-CCNC: 18 U/L (ref 12–78)
ANION GAP SERPL CALC-SCNC: 8 MMOL/L (ref 5–15)
AST SERPL-CCNC: 12 U/L (ref 15–37)
BASOPHILS # BLD: 0 K/UL (ref 0–0.1)
BASOPHILS NFR BLD: 0 % (ref 0–1)
BILIRUB SERPL-MCNC: 1.3 MG/DL (ref 0.2–1)
BUN SERPL-MCNC: 12 MG/DL (ref 6–20)
BUN/CREAT SERPL: 13 (ref 12–20)
CALCIUM SERPL-MCNC: 9 MG/DL (ref 8.5–10.1)
CHLORIDE SERPL-SCNC: 101 MMOL/L (ref 97–108)
CHOLEST SERPL-MCNC: 132 MG/DL
CO2 SERPL-SCNC: 25 MMOL/L (ref 21–32)
CREAT SERPL-MCNC: 0.89 MG/DL (ref 0.7–1.3)
DIFFERENTIAL METHOD BLD: ABNORMAL
EOSINOPHIL # BLD: 0.1 K/UL (ref 0–0.4)
EOSINOPHIL NFR BLD: 2 % (ref 0–7)
ERYTHROCYTE [DISTWIDTH] IN BLOOD BY AUTOMATED COUNT: 14.1 % (ref 11.5–14.5)
EST. AVERAGE GLUCOSE BLD GHB EST-MCNC: 120 MG/DL
GLOBULIN SER CALC-MCNC: 3.9 G/DL (ref 2–4)
GLUCOSE SERPL-MCNC: 95 MG/DL (ref 65–100)
HBA1C MFR BLD: 5.8 % (ref 4–5.6)
HCT VFR BLD AUTO: 49.6 % (ref 36.6–50.3)
HDLC SERPL-MCNC: 47 MG/DL
HDLC SERPL: 2.8 {RATIO} (ref 0–5)
HGB BLD-MCNC: 15.9 G/DL (ref 12.1–17)
IMM GRANULOCYTES # BLD AUTO: 0 K/UL (ref 0–0.04)
IMM GRANULOCYTES NFR BLD AUTO: 0 % (ref 0–0.5)
LDLC SERPL CALC-MCNC: 71.2 MG/DL (ref 0–100)
LIPID PROFILE,FLP: NORMAL
LYMPHOCYTES # BLD: 1 K/UL (ref 0.8–3.5)
LYMPHOCYTES NFR BLD: 17 % (ref 12–49)
MCH RBC QN AUTO: 31.9 PG (ref 26–34)
MCHC RBC AUTO-ENTMCNC: 32.1 G/DL (ref 30–36.5)
MCV RBC AUTO: 99.4 FL (ref 80–99)
MONOCYTES # BLD: 0.7 K/UL (ref 0–1)
MONOCYTES NFR BLD: 11 % (ref 5–13)
NEUTS SEG # BLD: 4.2 K/UL (ref 1.8–8)
NEUTS SEG NFR BLD: 70 % (ref 32–75)
NRBC # BLD: 0 K/UL (ref 0–0.01)
NRBC BLD-RTO: 0 PER 100 WBC
PLATELET # BLD AUTO: 147 K/UL (ref 150–400)
PMV BLD AUTO: 11.2 FL (ref 8.9–12.9)
POTASSIUM SERPL-SCNC: 4.7 MMOL/L (ref 3.5–5.1)
PROT SERPL-MCNC: 7.6 G/DL (ref 6.4–8.2)
RBC # BLD AUTO: 4.99 M/UL (ref 4.1–5.7)
SODIUM SERPL-SCNC: 134 MMOL/L (ref 136–145)
TRIGL SERPL-MCNC: 69 MG/DL (ref ?–150)
VIT B12 SERPL-MCNC: >2000 PG/ML (ref 193–986)
VLDLC SERPL CALC-MCNC: 13.8 MG/DL
WBC # BLD AUTO: 6 K/UL (ref 4.1–11.1)

## 2020-09-09 ENCOUNTER — CLINICAL SUPPORT (OUTPATIENT)
Dept: FAMILY MEDICINE CLINIC | Age: 85
End: 2020-09-09
Payer: MEDICARE

## 2020-09-09 VITALS — TEMPERATURE: 97.2 F

## 2020-09-09 DIAGNOSIS — Z23 ENCOUNTER FOR IMMUNIZATION: Primary | ICD-10-CM

## 2020-09-09 PROCEDURE — 90653 IIV ADJUVANT VACCINE IM: CPT | Performed by: FAMILY MEDICINE

## 2020-09-09 PROCEDURE — G0008 ADMIN INFLUENZA VIRUS VAC: HCPCS | Performed by: FAMILY MEDICINE

## 2020-10-26 RX ORDER — HYDROCHLOROTHIAZIDE 25 MG/1
TABLET ORAL
Qty: 90 TAB | Refills: 0 | Status: SHIPPED | OUTPATIENT
Start: 2020-10-26 | End: 2021-01-27

## 2021-03-15 ENCOUNTER — OFFICE VISIT (OUTPATIENT)
Dept: FAMILY MEDICINE CLINIC | Age: 86
End: 2021-03-15
Payer: MEDICARE

## 2021-03-15 VITALS
OXYGEN SATURATION: 100 % | HEIGHT: 69 IN | WEIGHT: 170 LBS | TEMPERATURE: 97.3 F | DIASTOLIC BLOOD PRESSURE: 68 MMHG | HEART RATE: 63 BPM | RESPIRATION RATE: 15 BRPM | BODY MASS INDEX: 25.18 KG/M2 | SYSTOLIC BLOOD PRESSURE: 126 MMHG

## 2021-03-15 DIAGNOSIS — I10 ESSENTIAL HYPERTENSION: Chronic | ICD-10-CM

## 2021-03-15 DIAGNOSIS — C61 PROSTATE CANCER (HCC): ICD-10-CM

## 2021-03-15 DIAGNOSIS — E11.40 TYPE 2 DIABETES MELLITUS WITH DIABETIC NEUROPATHY, WITHOUT LONG-TERM CURRENT USE OF INSULIN (HCC): Primary | Chronic | ICD-10-CM

## 2021-03-15 DIAGNOSIS — E78.00 PURE HYPERCHOLESTEROLEMIA: Chronic | ICD-10-CM

## 2021-03-15 PROCEDURE — 99214 OFFICE O/P EST MOD 30 MIN: CPT | Performed by: FAMILY MEDICINE

## 2021-03-15 PROCEDURE — G8427 DOCREV CUR MEDS BY ELIG CLIN: HCPCS | Performed by: FAMILY MEDICINE

## 2021-03-15 PROCEDURE — G8419 CALC BMI OUT NRM PARAM NOF/U: HCPCS | Performed by: FAMILY MEDICINE

## 2021-03-15 PROCEDURE — G8536 NO DOC ELDER MAL SCRN: HCPCS | Performed by: FAMILY MEDICINE

## 2021-03-15 PROCEDURE — 1101F PT FALLS ASSESS-DOCD LE1/YR: CPT | Performed by: FAMILY MEDICINE

## 2021-03-15 PROCEDURE — G8510 SCR DEP NEG, NO PLAN REQD: HCPCS | Performed by: FAMILY MEDICINE

## 2021-03-15 NOTE — PROGRESS NOTES
[facility]    History of Present Illness:   Bethany Soria is a 80 y.o. male with history of DM, HTN, Prostae Cancer, HLD  CC: Follow up Chronic Conditions  History provided by patient and Records    HPI:  Diabetes Follow up: Overall the patient feels well with good energy level. Current Medications:   Key Antihyperglycemic Medications     Patient is on no antihyperglycemic meds. .   Insulin dependence: NO   Frequency of home glucose testing: prn   Blood Sugar range at home: 100-130    Last eye exam: In past 12 months. Last foot exam: Over a year ago   Polyuria, polyphagia or polydipsia: NO   Retinopathy: NO   Neuropathy SX: NO   Low blood sugar symptoms: NO   Dietary compliance: compliant most of the time   Medication compliance: Stopped previously   On ASA: YES   Tobacco Use: NO   Depression: NO     Wt Readings from Last 3 Encounters:   03/15/21 170 lb (77.1 kg)   02/19/20 171 lb (77.6 kg)   02/04/20 166 lb (75.3 kg)     Hypertension Follow up:  Currently Taking HCTZ, 25 mg, Enalapril 10 mg. The patient reports:  taking medications as instructed, no medication side effects noted, home BP monitoring in range of 480'D systolic over 08-12'M diastolic, no TIA's, no chest pain on exertion, no dyspnea on exertion, no swelling of ankles, no orthostatic dizziness or lightheadedness, no orthopnea or paroxysmal nocturnal dyspnea. BP Readings from Last 3 Encounters:   03/15/21 126/68   02/19/20 97/63   02/04/20 126/78        Hypertriglyceridemia Follow up:   Cardiovascular risks for him are: diabetic  hypertension  hyperlipidemia. Currently he takes Zocor (simvastatin) , 20 mg.    Myalgias: NO   Fatigue: NO   Other side effects: NO     Wt Readings from Last 3 Encounters:   03/15/21 170 lb (77.1 kg)   02/19/20 171 lb (77.6 kg)   02/04/20 166 lb (75.3 kg)          Health Maintenance  Health Maintenance Due   Topic Date Due    COVID-19 Vaccine (1) Never done    Shingrix Vaccine Age 50> (1 of 2) Never done    Pneumococcal 65+ years (2 of 2 - PPSV23) 08/05/2016    GLAUCOMA SCREENING Q2Y  12/04/2020    Eye Exam Retinal or Dilated  12/04/2020    MICROALBUMIN Q1  02/04/2021    Medicare Yearly Exam  02/19/2021       Past Medical, Family, and Social History:     Current Outpatient Medications on File Prior to Visit   Medication Sig Dispense Refill    simvastatin (ZOCOR) 20 mg tablet TAKE 1 TABLET NIGHTLY 90 Tab 0    hydroCHLOROthiazide (HYDRODIURIL) 25 mg tablet Take 1 tablet by mouth once daily 90 Tab 0    enalapril (VASOTEC) 10 mg tablet Take 1 tablet by mouth once daily 30 Tab 5    cyanocobalamin, vitamin B-12, 5,000 mcg subl by SubLINGual route.  triamcinolone acetonide (KENALOG) 0.1 % topical cream Apply  to affected area two (2) times a day. use thin layer 85.2 g 4    acetaminophen (TYLENOL) 650 mg CR tablet Take 650 mg by mouth nightly.  tamsulosin (FLOMAX) 0.4 mg capsule Take 0.4 mg by mouth daily (after dinner).  finasteride (PROSCAR) 5 mg tablet Take 5 mg by mouth daily (after dinner).  cholecalciferol (VITAMIN D-3) 400 unit Tab tablet Take 400 Units by mouth daily.  aspirin 81 mg Tab Take 81 mg by mouth daily.  varicella-zoster gE vac,2 of 2 50 mcg susr Shingrix, one injection now and repeat in 4-6 months. To be administered at Pharmacy. Fax confirmation to me at [phone]. 2 Each 0    lancets (TRUEPLUS LANCETS) 33 gauge misc Use as directed to check blood sugar once daily. Dx:E11.9 Please dispense appropriate product 100 Lancet 4    glucose blood VI test strips (TRUETEST TEST STRIPS) strip Use as directed to check blood sugar once daily. Dx:E11.9 50 Strip 5    Blood-Glucose Meter (TRUE METRIX GLUCOSE METER) misc Use as directed to check blood sugar once daily. Dx:E11.9 Please dispense appropriate product 1 Each 0    glucose blood VI test strips (TRUE METRIX GLUCOSE TEST STRIP) strip Use as directed to check blood sugar once daily.  Dx:E11.9 Please dispense appropriate product 50 Strip 5     No current facility-administered medications on file prior to visit. Patient Active Problem List   Diagnosis Code    Hypertension I10    Erythrocytosis D75.1    Erectile dysfunction N52.9    Colon polyp K63.5    Hyperkeratosis L85.9    Hyperlipidemia E78.5    [facility] C61    Type 2 diabetes mellitus with diabetic neuropathy ([facility] [250.60, 357.2] E11.40       Social History     Socioeconomic History    Marital status:      Spouse name: Not on file    Number of children: Not on file    Years of education: Not on file    Highest education level: Not on file   Occupational History    Not on file   Social Needs    Financial resource strain: Not on file    Food insecurity     Worry: Not on file     Inability: Not on file    Transportation needs     Medical: Not on file     Non-medical: Not on file   Tobacco Use    Smoking status: Former Smoker     Packs/day: 1.00     Years: 15.00     Pack years: 15.00    Smokeless tobacco: Never Used   Substance and Sexual Activity    Alcohol use: No    Drug use: No    Sexual activity: Not on file   Lifestyle    Physical activity     Days per week: Not on file     Minutes per session: Not on file    Stress: Not on file   Relationships    Social connections     Talks on phone: Not on file     Gets together: Not on file     Attends Worship service: Not on file     Active member of club or organization: Not on file     Attends meetings of clubs or organizations: Not on file     Relationship status: Not on file    Intimate partner violence     Fear of current or ex partner: Not on file     Emotionally abused: Not on file     Physically abused: Not on file     Forced sexual activity: Not on file   Other Topics Concern    Not on file   Social History Narrative    Not on file       Review of Systems   Review of Systems   Constitutional: Negative for chills and fever.    HENT: Negative for congestion. Respiratory: Negative for cough. Cardiovascular: Negative for chest pain and palpitations. Gastrointestinal: Negative for nausea and vomiting. Neurological: Negative for dizziness, tingling and headaches. Psychiatric/Behavioral: Negative for depression. Objective:     Visit Vitals  /68   Pulse 63   Temp 97.3 °F (36.3 °C) (Temporal)   Resp 15   Ht 5' 9\" (1.753 m)   Wt 170 lb (77.1 kg)   SpO2 100%   BMI 25.10 kg/m²        Physical Exam  Vitals signs and nursing note reviewed. Constitutional:       Appearance: Normal appearance. HENT:      Head: Normocephalic and atraumatic. Neck:      Musculoskeletal: Normal range of motion and neck supple. Cardiovascular:      Rate and Rhythm: Normal rate and regular rhythm. Pulses: Normal pulses. Heart sounds: Normal heart sounds. No murmur. No friction rub. No gallop. Pulmonary:      Effort: Pulmonary effort is normal.      Breath sounds: Normal breath sounds. Abdominal:      General: Abdomen is flat. Bowel sounds are normal.      Palpations: Abdomen is soft. Skin:     General: Skin is warm and dry. Neurological:      Mental Status: He is alert. Diabetic foot exam:     Left Foot:   Visual Exam: callous - Right toe base   Pulse DP: 2+ (normal)   Filament test: normal sensation    Vibratory sensation: normal      Right Foot:   Visual Exam: normal    Pulse DP: 2+ (normal)   Filament test: normal sensation    Vibratory sensation: normal         Pertinent Labs/Studies:      Assessment and orders:       ICD-10-CM ICD-9-CM    1. Type 2 diabetes mellitus with diabetic neuropathy, without long-term current use of insulin ([facility])  E11.40 250.60 HEMOGLOBIN A1C WITH EAG     357.2 MICROALBUMIN, UR, RAND W/ MICROALB/CREAT RATIO       DIABETES FOOT EXAM   2. Essential hypertension  N19 451.8 METABOLIC PANEL, COMPREHENSIVE      CBC W/O DIFF   3.  Pure hypercholesterolemia  E78.00 272.0 LIPID PANEL   4. Prostate cancer ([facility]  C61 185      Diagnoses and all orders for this visit:    1. Type 2 diabetes mellitus with diabetic neuropathy, without long-term current use of insulin ([facility]): Checking DM labs as off medication for several months  -     HEMOGLOBIN A1C WITH EAG; Future  -     MICROALBUMIN, UR, RAND W/ MICROALB/CREAT RATIO; Future  -      DIABETES FOOT EXAM    2. Essential hypertension: Condition and symptoms are well controlled at this time. No changes to therapy at this time.   -     METABOLIC PANEL, COMPREHENSIVE; Future  -     CBC W/O DIFF; Future    3. Pure hypercholesterolemia: Check labs  -     LIPID PANEL; Future    4. Prostate cancer [facility]): Follow up with Urology. Follow-up and Dispositions    · Return in about 3 months (around 6/15/2021) for MWE. I have discussed the diagnosis with the patient and the intended plan as seen in the above orders. Social history, medical history, and labs were reviewed. The patient has received an after-visit summary and questions were answered concerning future plans. I have discussed medication side effects and warnings with the patient as well.     MD MATTHEW Bailey & PINA ASTUDILLO [facility]  03/15/21

## 2021-03-16 LAB
ALBUMIN SERPL-MCNC: 3.9 G/DL (ref 3.5–5)
ALBUMIN/GLOB SERPL: 1 {RATIO} (ref 1.1–2.2)
ALP SERPL-CCNC: 61 U/L (ref 45–117)
ALT SERPL-CCNC: 17 U/L (ref 12–78)
ANION GAP SERPL CALC-SCNC: 8 MMOL/L (ref 5–15)
AST SERPL-CCNC: 14 U/L (ref 15–37)
BILIRUB SERPL-MCNC: 0.9 MG/DL (ref 0.2–1)
BUN SERPL-MCNC: 14 MG/DL (ref 6–20)
BUN/CREAT SERPL: 15 (ref 12–20)
CALCIUM SERPL-MCNC: 9 MG/DL (ref 8.5–10.1)
CHLORIDE SERPL-SCNC: 100 MMOL/L (ref 97–108)
CHOLEST SERPL-MCNC: 136 MG/DL
CO2 SERPL-SCNC: 28 MMOL/L (ref 21–32)
CREAT SERPL-MCNC: 0.95 MG/DL (ref 0.7–1.3)
CREAT UR-MCNC: 123 MG/DL
ERYTHROCYTE [DISTWIDTH] IN BLOOD BY AUTOMATED COUNT: 13.9 % (ref 11.5–14.5)
EST. AVERAGE GLUCOSE BLD GHB EST-MCNC: 120 MG/DL
GLOBULIN SER CALC-MCNC: 4 G/DL (ref 2–4)
GLUCOSE SERPL-MCNC: 92 MG/DL (ref 65–100)
HBA1C MFR BLD: 5.8 % (ref 4–5.6)
HCT VFR BLD AUTO: 47.4 % (ref 36.6–50.3)
HDLC SERPL-MCNC: 48 MG/DL
HDLC SERPL: 2.8 {RATIO} (ref 0–5)
HGB BLD-MCNC: 15.5 G/DL (ref 12.1–17)
LDLC SERPL CALC-MCNC: 72.4 MG/DL (ref 0–100)
LIPID PROFILE,FLP: NORMAL
MCH RBC QN AUTO: 31.9 PG (ref 26–34)
MCHC RBC AUTO-ENTMCNC: 32.7 G/DL (ref 30–36.5)
MCV RBC AUTO: 97.5 FL (ref 80–99)
MICROALBUMIN UR-MCNC: 1.83 MG/DL
MICROALBUMIN/CREAT UR-RTO: 15 MG/G (ref 0–30)
NRBC # BLD: 0 K/UL (ref 0–0.01)
NRBC BLD-RTO: 0 PER 100 WBC
PLATELET # BLD AUTO: 141 K/UL (ref 150–400)
POTASSIUM SERPL-SCNC: 4.4 MMOL/L (ref 3.5–5.1)
PROT SERPL-MCNC: 7.9 G/DL (ref 6.4–8.2)
RBC # BLD AUTO: 4.86 M/UL (ref 4.1–5.7)
SODIUM SERPL-SCNC: 136 MMOL/L (ref 136–145)
TRIGL SERPL-MCNC: 78 MG/DL (ref ?–150)
VLDLC SERPL CALC-MCNC: 15.6 MG/DL
WBC # BLD AUTO: 5.5 K/UL (ref 4.1–11.1)

## 2021-03-22 ENCOUNTER — TRANSCRIBE ORDER (OUTPATIENT)
Dept: SCHEDULING | Age: 86
End: 2021-03-22

## 2021-03-22 DIAGNOSIS — C61 PROSTATE CA (HCC): Primary | ICD-10-CM

## 2021-04-13 ENCOUNTER — HOSPITAL ENCOUNTER (OUTPATIENT)
Dept: NUCLEAR MEDICINE | Age: 86
Discharge: HOME | End: 2021-04-13
Attending: UROLOGY
Payer: MEDICARE

## 2021-04-13 DIAGNOSIS — C61 PROSTATE CA (HCC): ICD-10-CM

## 2021-04-13 PROCEDURE — A9503 TC99M MEDRONATE: HCPCS

## 2021-09-24 RX ORDER — HYDROCHLOROTHIAZIDE 25 MG/1
TABLET ORAL
Qty: 90 TABLET | Refills: 1 | Status: SHIPPED | OUTPATIENT
Start: 2021-09-24 | End: 2021-09-27 | Stop reason: SDUPTHER

## 2021-09-27 RX ORDER — HYDROCHLOROTHIAZIDE 25 MG/1
25 TABLET ORAL DAILY
Qty: 90 TABLET | Refills: 1 | Status: SHIPPED | OUTPATIENT
Start: 2021-09-27 | End: 2022-03-24

## 2021-10-07 ENCOUNTER — OFFICE VISIT (OUTPATIENT)
Dept: FAMILY MEDICINE CLINIC | Age: 86
End: 2021-10-07
Payer: MEDICARE

## 2021-10-07 VITALS
HEART RATE: 66 BPM | DIASTOLIC BLOOD PRESSURE: 59 MMHG | TEMPERATURE: 97.3 F | SYSTOLIC BLOOD PRESSURE: 119 MMHG | BODY MASS INDEX: 23.99 KG/M2 | WEIGHT: 162 LBS | RESPIRATION RATE: 16 BRPM | HEIGHT: 69 IN | OXYGEN SATURATION: 100 %

## 2021-10-07 DIAGNOSIS — E78.00 PURE HYPERCHOLESTEROLEMIA: ICD-10-CM

## 2021-10-07 DIAGNOSIS — Z23 ENCOUNTER FOR IMMUNIZATION: ICD-10-CM

## 2021-10-07 DIAGNOSIS — C61 PROSTATE CANCER (HCC): ICD-10-CM

## 2021-10-07 DIAGNOSIS — I10 PRIMARY HYPERTENSION: ICD-10-CM

## 2021-10-07 DIAGNOSIS — E11.40 TYPE 2 DIABETES MELLITUS WITH DIABETIC NEUROPATHY, WITHOUT LONG-TERM CURRENT USE OF INSULIN (HCC): Primary | ICD-10-CM

## 2021-10-07 PROCEDURE — G8536 NO DOC ELDER MAL SCRN: HCPCS | Performed by: FAMILY MEDICINE

## 2021-10-07 PROCEDURE — 99214 OFFICE O/P EST MOD 30 MIN: CPT | Performed by: FAMILY MEDICINE

## 2021-10-07 PROCEDURE — 90694 VACC AIIV4 NO PRSRV 0.5ML IM: CPT | Performed by: FAMILY MEDICINE

## 2021-10-07 PROCEDURE — 90732 PPSV23 VACC 2 YRS+ SUBQ/IM: CPT | Performed by: FAMILY MEDICINE

## 2021-10-07 PROCEDURE — 1101F PT FALLS ASSESS-DOCD LE1/YR: CPT | Performed by: FAMILY MEDICINE

## 2021-10-07 PROCEDURE — G0008 ADMIN INFLUENZA VIRUS VAC: HCPCS | Performed by: FAMILY MEDICINE

## 2021-10-07 PROCEDURE — G8510 SCR DEP NEG, NO PLAN REQD: HCPCS | Performed by: FAMILY MEDICINE

## 2021-10-07 PROCEDURE — G0009 ADMIN PNEUMOCOCCAL VACCINE: HCPCS | Performed by: FAMILY MEDICINE

## 2021-10-07 PROCEDURE — G8427 DOCREV CUR MEDS BY ELIG CLIN: HCPCS | Performed by: FAMILY MEDICINE

## 2021-10-07 PROCEDURE — G8420 CALC BMI NORM PARAMETERS: HCPCS | Performed by: FAMILY MEDICINE

## 2021-10-07 NOTE — PROGRESS NOTES
1. Have you been to the ER, urgent care clinic since your last visit? Hospitalized since your last visit? No    2. Have you seen or consulted any other health care providers outside of the [address] [place] [address] since your last visit? Include any pap smears or colon screening. No    Reviewed record in preparation for visit and have necessary documentation  Goals that were addressed and/or need to be completed during or after this appointment include     Health Maintenance Due   Topic Date Due    COVID-19 Vaccine (1) Never done    Shingrix Vaccine Age 50> (1 of 2) Never done    Pneumococcal 65+ years (2 of 2 - PPSV23) 08/05/2016    Eye Exam Retinal or Dilated  12/04/2020    Medicare Yearly Exam  02/19/2021    Flu Vaccine (1) 09/01/2021       Patient is accompanied by self I have received verbal consent from Jacey Lao to discuss any/all medical information while they are present in the room.

## 2021-10-07 NOTE — PROGRESS NOTES
[facility]    History of Present Illness:   Andrey Aase is a 80 y.o. male with history of DM, HTN, Prostae Cancer, HLD  CC: Follow Up [place]  History provided by patient and Records    HPI:  Grieving: Wife recently . Overall doing well. Denies SI. Denies severe depressed mood. Hypertension Follow up:  Currently Taking:   Key CAD CHF Meds             hydroCHLOROthiazide (HYDRODIURIL) 25 mg tablet (Taking) Take 1 Tablet by mouth daily. enalapril (VASOTEC) 10 mg tablet (Taking) Take 1 tablet by mouth once daily    aspirin 81 mg Tab (Taking) Take 81 mg by mouth daily. The patient reports:  taking medications as instructed, no medication side effects noted, no TIA's, no chest pain on exertion, no dyspnea on exertion, no swelling of ankles, no orthostatic dizziness or lightheadedness, no orthopnea or paroxysmal nocturnal dyspnea. BP Readings from Last 3 Encounters:   10/07/21 (!) 119/59   03/15/21 126/68   20 97/63     Diabetes Follow up: Overall the patient feels well with good energy level. Current Medications:   Key Antihyperglycemic Medications     Patient is on no antihyperglycemic meds. Insulin dependence: NO   Frequency of home glucose testing: Daily   Blood Sugar range at home: <150s    Last eye exam: In past 12 months. Last foot exam: This year.    Polyuria, polyphagia or polydipsia: NO   Retinopathy: NO   Neuropathy SX: NO   Low blood sugar symptoms: NO   Dietary compliance: compliant most of the time   Medication compliance: compliant most of the time   On ASA: YES   Tobacco Use: NO   Depression: Grieving     Wt Readings from Last 3 Encounters:   10/07/21 162 lb (73.5 kg)   03/15/21 170 lb (77.1 kg)   20 171 lb (77.6 kg)        Lab Results   Component Value Date/Time    Hemoglobin A1c 5.8 (H) 03/15/2021 03:35 PM    Hemoglobin A1c (POC) 6.1 2015 08:45 AM      Lab Results   Component Value Date/Time    Microalbumin/Creat ratio (mg/g creat) 15 03/15/2021 03:35 PM    Microalbumin,urine random 1.83 03/15/2021 03:35 PM       Lab Results   Component Value Date/Time    Creatinine 0.95 03/15/2021 03:35 PM      Hypertriglyceridemia Follow up:   Cardiovascular risks for him are: diabetic  hypertension  hyperlipidemia. Current Medications:  Key Antihyperlipidemia Meds             simvastatin (ZOCOR) 20 mg tablet (Taking) TAKE 1 TABLET NIGHTLY          Compliance: YES   Myalgias: NO   Fatigue: NO   Other side effects: NO     Wt Readings from Last 3 Encounters:   10/07/21 162 lb (73.5 kg)   03/15/21 170 lb (77.1 kg)   02/19/20 171 lb (77.6 kg)       Lab Results   Component Value Date/Time    Cholesterol, total 136 03/15/2021 03:35 PM    HDL Cholesterol 48 03/15/2021 03:35 PM    LDL, calculated 72.4 03/15/2021 03:35 PM    VLDL, calculated 15.6 03/15/2021 03:35 PM    Triglyceride 78 03/15/2021 03:35 PM    CHOL/HDL Ratio 2.8 03/15/2021 03:35 PM      Lab Results   Component Value Date/Time    ALT (SGPT) 17 03/15/2021 03:35 PM    AST (SGOT) 14 (L) 03/15/2021 03:35 PM    Alk. phosphatase 61 03/15/2021 03:35 PM    Bilirubin, total 0.9 03/15/2021 03:35 PM      Prostate Cancer:  Appears Stable Flomax/Finasteride. Health Maintenance  Health Maintenance Due   Topic Date Due    COVID-19 Vaccine (1) Never done    Shingrix Vaccine Age 50> (1 of 2) Never done    Pneumococcal 65+ years (2 of 2 - PPSV23) 08/05/2016    Eye Exam Retinal or Dilated  12/04/2020    Medicare Yearly Exam  02/19/2021    Flu Vaccine (1) 09/01/2021       Past Medical, Family, and Social History:     Current Outpatient Medications on File Prior to Visit   Medication Sig Dispense Refill    simvastatin (ZOCOR) 20 mg tablet TAKE 1 TABLET NIGHTLY 90 Tablet 0    hydroCHLOROthiazide (HYDRODIURIL) 25 mg tablet Take 1 Tablet by mouth daily.  90 Tablet 1    enalapril (VASOTEC) 10 mg tablet Take 1 tablet by mouth once daily 30 Tab 5    cyanocobalamin, vitamin B-12, 5,000 mcg subl by SubLINGual route.  triamcinolone acetonide (KENALOG) 0.1 % topical cream Apply  to affected area two (2) times a day. use thin layer 85.2 g 4    lancets (TRUEPLUS LANCETS) 33 gauge misc Use as directed to check blood sugar once daily. Dx:E11.9 Please dispense appropriate product 100 Lancet 4    glucose blood VI test strips (TRUETEST TEST STRIPS) strip Use as directed to check blood sugar once daily. Dx:E11.9 50 Strip 5    Blood-Glucose Meter (TRUE METRIX GLUCOSE METER) misc Use as directed to check blood sugar once daily. Dx:E11.9 Please dispense appropriate product 1 Each 0    glucose blood VI test strips (TRUE METRIX GLUCOSE TEST STRIP) strip Use as directed to check blood sugar once daily. Dx:E11.9 Please dispense appropriate product 50 Strip 5    acetaminophen (TYLENOL) 650 mg CR tablet Take 650 mg by mouth nightly.  tamsulosin (FLOMAX) 0.4 mg capsule Take 0.4 mg by mouth daily (after dinner).  finasteride (PROSCAR) 5 mg tablet Take 5 mg by mouth daily (after dinner).  cholecalciferol (VITAMIN D-3) 400 unit Tab tablet Take 400 Units by mouth daily.  aspirin 81 mg Tab Take 81 mg by mouth daily.  varicella-zoster gE vac,2 of 2 50 mcg susr Shingrix, one injection now and repeat in 4-6 months. To be administered at Pharmacy. Fax confirmation to me at [phone]. 2 Each 0     No current facility-administered medications on file prior to visit.        Patient Active Problem List   Diagnosis Code    Hypertension I10    Erythrocytosis D75.1    Erectile dysfunction N52.9    Colon polyp K63.5    Hyperkeratosis L85.9    Hyperlipidemia E78.5    Prostate cancer ([facility] C61    Type 2 diabetes mellitus with diabetic neuropathy ([facility] [250.60, 357.2] E11.40       Social History     Socioeconomic History    Marital status:      Spouse name: Not on file    Number of children: Not on file    Years of education: Not on file    Highest education level: Not on file   Occupational History    Not on file   Tobacco Use    Smoking status: Former Smoker     Packs/day: 1.00     Years: 15.00     Pack years: 15.00    Smokeless tobacco: Never Used   Substance and Sexual Activity    Alcohol use: No    Drug use: No    Sexual activity: Not on file   Other Topics Concern    Not on file   Social History Narrative    Not on file     Social Determinants of Health     Financial Resource Strain:     Difficulty of Paying Living Expenses:    Food Insecurity:     Worried About Running Out of Food in the Last Year:     [address] in the Last Year:    Transportation Needs:     Lack of Transportation (Medical):  Lack of Transportation (Non-Medical):    Physical Activity:     Days of Exercise per Week:     Minutes of Exercise per Session:    Stress:     Feeling of Stress :    Social Connections:     Frequency of Communication with Friends and Family:     Frequency of Social Gatherings with Friends and Family:     Attends [employer] Services:     Active Member of Clubs or Organizations:     Attends Club or Organization Meetings:     Marital Status:    Intimate Partner Violence:     Fear of Current or Ex-Partner:     Emotionally Abused:     Physically Abused:     Sexually Abused:        Review of Systems   Review of Systems   Constitutional: Negative for chills and fever. HENT: Negative for congestion. Cardiovascular: Negative for chest pain and palpitations. Gastrointestinal: Negative for nausea and vomiting. Objective:     Visit Vitals  BP (!) 119/59 (BP 1 Location: Right arm, BP Patient Position: Sitting, BP Cuff Size: Adult)   Pulse 66   Temp 97.3 °F (36.3 °C) (Oral)   Resp 16   Ht 5' 9\" (1.753 m)   Wt 162 lb (73.5 kg)   SpO2 100%   BMI 23.92 kg/m²        Physical Exam  Vitals and nursing note reviewed. Constitutional:       Appearance: Normal appearance. HENT:      Head: Normocephalic and atraumatic.    Cardiovascular:      Rate and Rhythm: Normal rate and regular rhythm. Pulses: Normal pulses. Heart sounds: Normal heart sounds. No murmur heard. No friction rub. No gallop. Pulmonary:      Effort: Pulmonary effort is normal.      Breath sounds: Normal breath sounds. Abdominal:      General: Abdomen is flat. Bowel sounds are normal.      Palpations: Abdomen is soft. Musculoskeletal:      Cervical back: Normal range of motion and neck supple. Neurological:      Mental Status: He is alert. Pertinent Labs/Studies:      Assessment and orders:       ICD-10-CM ICD-9-CM    1. Type 2 diabetes mellitus with diabetic neuropathy, without long-term current use of insulin (HCC)  E11.40 250.60 HEMOGLOBIN A1C WITH EAG     357.2    2. Primary hypertension  I10 401.9 CBC W/O DIFF      METABOLIC PANEL, COMPREHENSIVE   3. Pure hypercholesterolemia  E78.00 272.0 LIPID PANEL   4. Prostate cancer ([facility])  C61 185 PSA W/ REFLX FREE PSA     Diagnoses and all orders for this visit:    1. Type 2 diabetes mellitus with diabetic neuropathy, without long-term current use of insulin [facility]): Discussed with patient today that the goal for their diabetes is to have a HgA1C<7 and ideally as close to 6.5 as possible. We discussed diet and medications. The goal for the cholesterol LDL is less than 70 and HDL>40. Patient is aware of the need for yearly eye exams and to take care of their feet daily. Discussed with patient that blood pressure should be less than 130/80 and watching salt intake is very important.    -     HEMOGLOBIN A1C WITH EAG; Future    2. Primary hypertension: Our goal is to normalize the blood pressure to decrease the risks of strokes and heart attacks. The patient is in agreement with the plan. -     CBC W/O DIFF; Future  -     METABOLIC PANEL, COMPREHENSIVE; Future    3.  Pure hypercholesterolemia: The patient is aware of our goal to reduce or eliminate the long term problems (such as strokes and heart attacks) related to poorly controlled Triglycerides, LDL, Cholesterol.   -     LIPID PANEL; Future    4. Prostate cancer ([facility]  -     PSA W/ REFLX FREE PSA; Future      Follow-up and Dispositions    · Return in about 2 months (around 12/7/2021) for MWE. I have discussed the diagnosis with the patient and the intended plan as seen in the above orders. Social history, medical history, and labs were reviewed. The patient has received an after-visit summary and questions were answered concerning future plans. I have discussed medication side effects and warnings with the patient as well.     MD MATTHEW Murphy & PINA ASTUDILLO [facility]  10/07/21

## 2021-10-08 LAB
ALBUMIN SERPL-MCNC: 3.5 G/DL (ref 3.5–5)
ALBUMIN/GLOB SERPL: 0.9 {RATIO} (ref 1.1–2.2)
ALP SERPL-CCNC: 78 U/L (ref 45–117)
ALT SERPL-CCNC: 16 U/L (ref 12–78)
ANION GAP SERPL CALC-SCNC: 6 MMOL/L (ref 5–15)
AST SERPL-CCNC: 22 U/L (ref 15–37)
BILIRUB SERPL-MCNC: 0.8 MG/DL (ref 0.2–1)
BUN SERPL-MCNC: 17 MG/DL (ref 6–20)
BUN/CREAT SERPL: 18 (ref 12–20)
CALCIUM SERPL-MCNC: 9.2 MG/DL (ref 8.5–10.1)
CHLORIDE SERPL-SCNC: 100 MMOL/L (ref 97–108)
CHOLEST SERPL-MCNC: 149 MG/DL
CO2 SERPL-SCNC: 30 MMOL/L (ref 21–32)
CREAT SERPL-MCNC: 0.97 MG/DL (ref 0.7–1.3)
ERYTHROCYTE [DISTWIDTH] IN BLOOD BY AUTOMATED COUNT: 14.6 % (ref 11.5–14.5)
EST. AVERAGE GLUCOSE BLD GHB EST-MCNC: 117 MG/DL
GLOBULIN SER CALC-MCNC: 4 G/DL (ref 2–4)
GLUCOSE SERPL-MCNC: 98 MG/DL (ref 65–100)
HBA1C MFR BLD: 5.7 % (ref 4–5.6)
HCT VFR BLD AUTO: 45.7 % (ref 36.6–50.3)
HDLC SERPL-MCNC: 46 MG/DL
HDLC SERPL: 3.2 {RATIO} (ref 0–5)
HGB BLD-MCNC: 15.1 G/DL (ref 12.1–17)
LDLC SERPL CALC-MCNC: 76.4 MG/DL (ref 0–100)
MCH RBC QN AUTO: 32.3 PG (ref 26–34)
MCHC RBC AUTO-ENTMCNC: 33 G/DL (ref 30–36.5)
MCV RBC AUTO: 97.6 FL (ref 80–99)
NRBC # BLD: 0 K/UL (ref 0–0.01)
NRBC BLD-RTO: 0 PER 100 WBC
PLATELET # BLD AUTO: 143 K/UL (ref 150–400)
PMV BLD AUTO: 11.2 FL (ref 8.9–12.9)
POTASSIUM SERPL-SCNC: 4.3 MMOL/L (ref 3.5–5.1)
PROT SERPL-MCNC: 7.5 G/DL (ref 6.4–8.2)
RBC # BLD AUTO: 4.68 M/UL (ref 4.1–5.7)
SODIUM SERPL-SCNC: 136 MMOL/L (ref 136–145)
TRIGL SERPL-MCNC: 133 MG/DL (ref ?–150)
VLDLC SERPL CALC-MCNC: 26.6 MG/DL
WBC # BLD AUTO: 6.4 K/UL (ref 4.1–11.1)

## 2021-10-09 LAB
PSA SERPL-MCNC: 44.3 NG/ML (ref 0–4)
REFLEX CRITERIA: ABNORMAL

## 2022-02-22 DIAGNOSIS — E78.00 PURE HYPERCHOLESTEROLEMIA: Chronic | ICD-10-CM

## 2022-02-22 RX ORDER — SIMVASTATIN 20 MG/1
TABLET, FILM COATED ORAL
Qty: 90 TABLET | Refills: 0 | Status: SHIPPED | OUTPATIENT
Start: 2022-02-22 | End: 2022-02-24 | Stop reason: SDUPTHER

## 2022-02-24 DIAGNOSIS — E78.00 PURE HYPERCHOLESTEROLEMIA: Chronic | ICD-10-CM

## 2022-02-24 RX ORDER — SIMVASTATIN 20 MG/1
TABLET, FILM COATED ORAL
Qty: 90 TABLET | Refills: 0 | Status: SHIPPED | OUTPATIENT
Start: 2022-02-24 | End: 2022-05-06

## 2022-02-24 NOTE — TELEPHONE ENCOUNTER
Pt's Simvastatin has to be mail order instead of Local. Please send to this mail-order. Thank You. SIMVASTATIN 20 MG TABLET 90 DAY WITH REFILLS PLEASE. This is his new RX for Mail-order only thanks.

## 2022-03-24 RX ORDER — HYDROCHLOROTHIAZIDE 25 MG/1
TABLET ORAL
Qty: 90 TABLET | Refills: 0 | Status: SHIPPED | OUTPATIENT
Start: 2022-03-24 | End: 2022-07-20

## 2022-03-29 ENCOUNTER — OFFICE VISIT (OUTPATIENT)
Dept: FAMILY MEDICINE CLINIC | Age: 87
End: 2022-03-29
Payer: MEDICARE

## 2022-03-29 VITALS
BODY MASS INDEX: 27.58 KG/M2 | WEIGHT: 186.2 LBS | HEIGHT: 69 IN | RESPIRATION RATE: 18 BRPM | HEART RATE: 70 BPM | DIASTOLIC BLOOD PRESSURE: 64 MMHG | OXYGEN SATURATION: 99 % | TEMPERATURE: 96.8 F | SYSTOLIC BLOOD PRESSURE: 124 MMHG

## 2022-03-29 DIAGNOSIS — Z00.00 MEDICARE ANNUAL WELLNESS VISIT, SUBSEQUENT: Primary | ICD-10-CM

## 2022-03-29 DIAGNOSIS — E78.00 PURE HYPERCHOLESTEROLEMIA: Chronic | ICD-10-CM

## 2022-03-29 DIAGNOSIS — C61 PROSTATE CANCER (HCC): ICD-10-CM

## 2022-03-29 DIAGNOSIS — E11.40 TYPE 2 DIABETES MELLITUS WITH DIABETIC NEUROPATHY, WITHOUT LONG-TERM CURRENT USE OF INSULIN (HCC): ICD-10-CM

## 2022-03-29 DIAGNOSIS — I10 PRIMARY HYPERTENSION: Chronic | ICD-10-CM

## 2022-03-29 PROCEDURE — 99214 OFFICE O/P EST MOD 30 MIN: CPT | Performed by: FAMILY MEDICINE

## 2022-03-29 PROCEDURE — G8510 SCR DEP NEG, NO PLAN REQD: HCPCS | Performed by: FAMILY MEDICINE

## 2022-03-29 PROCEDURE — G0439 PPPS, SUBSEQ VISIT: HCPCS | Performed by: FAMILY MEDICINE

## 2022-03-29 PROCEDURE — G8536 NO DOC ELDER MAL SCRN: HCPCS | Performed by: FAMILY MEDICINE

## 2022-03-29 PROCEDURE — 1101F PT FALLS ASSESS-DOCD LE1/YR: CPT | Performed by: FAMILY MEDICINE

## 2022-03-29 PROCEDURE — G8427 DOCREV CUR MEDS BY ELIG CLIN: HCPCS | Performed by: FAMILY MEDICINE

## 2022-03-29 PROCEDURE — G8419 CALC BMI OUT NRM PARAM NOF/U: HCPCS | Performed by: FAMILY MEDICINE

## 2022-03-29 NOTE — PROGRESS NOTES
This is the Subsequent Medicare Annual Wellness Exam, performed 12 months or more after the Initial AWV or the last Subsequent AWV    I have reviewed the patient's medical history in detail and updated the computerized patient record. Assessment/Plan   Education and counseling provided:  Are appropriate based on today's review and evaluation  End-of-Life planning (with patient's consent)    1. Medicare annual wellness visit, subsequent  2. Primary hypertension  -     CBC W/O DIFF; Future  -     METABOLIC PANEL, COMPREHENSIVE; Future  3. Type 2 diabetes mellitus with diabetic neuropathy, without long-term current use of insulin (HCC)  -     HEMOGLOBIN A1C WITH EAG; Future  -     MICROALBUMIN, UR, RAND W/ MICROALB/CREAT RATIO; Future  -      DIABETES FOOT EXAM  4. Prostate cancer (HCC)  -     PSA W/ REFLX FREE PSA; Future  5. Pure hypercholesterolemia  -     LIPID PANEL; Future       Depression Risk Factor Screening     3 most recent PHQ Screens 3/29/2022   Little interest or pleasure in doing things Not at all   Feeling down, depressed, irritable, or hopeless Not at all   Total Score PHQ 2 0       Alcohol & Drug Abuse Risk Screen    Do you average more than 1 drink per night or more than 7 drinks a week: No    In the past three months have you have had more than 4 drinks containing alcohol on one occasion: No          Functional Ability and Level of Safety    Hearing: Hearing is good. Activities of Daily Living: The home contains: handrails and grab bars  Patient does total self care      Ambulation: with no difficulty     Fall Risk:  Fall Risk Assessment, last 12 mths 3/29/2022   Able to walk? Yes   Fall in past 12 months? 0   Do you feel unsteady?  0   Are you worried about falling 0      Abuse Screen:  Patient is not abused       Cognitive Screening    Has your family/caregiver stated any concerns about your memory: no     Cognitive Screening: Normal - MMSE (Mini Mental Status Exam)    Health Maintenance Due     Health Maintenance Due   Topic Date Due    COVID-19 Vaccine (1) Never done    Shingrix Vaccine Age 50> (1 of 2) Never done    Eye Exam Retinal or Dilated  12/04/2020    MICROALBUMIN Q1  03/15/2022       Patient Care Team   Patient Care Team:  Jayde Bryan MD as PCP - General (Family Medicine)  Jayde Bryan MD as PCP - [facility]  Marjorie Hollis MD (Ophthalmology)    History     Patient Active Problem List   Diagnosis Code    Hypertension I10    Erythrocytosis D75.1    Erectile dysfunction N52.9    Colon polyp K63.5    Hyperkeratosis L85.9    Hyperlipidemia E78.5    Prostate cancer ([facility] C61    Type 2 diabetes mellitus with diabetic neuropathy ([facility] [250.60, 357.2] E11.40     Past Medical History:   Diagnosis Date    Allergic rhinitis     Benign prostatic hypertrophy     Erectile dysfunction     Erythrocytosis     Hypertension     Polyp, colon       Past Surgical History:   Procedure Laterality Date    COLONOSCOPY N/A 4/10/2017    COLONOSCOPY performed by Wyatt Maki MD at [address] [place] [address]  4/10/2017          Current Outpatient Medications   Medication Sig Dispense Refill    hydroCHLOROthiazide (HYDRODIURIL) 25 mg tablet Take 1 tablet by mouth once daily 90 Tablet 0    simvastatin (ZOCOR) 20 mg tablet TAKE 1 TABLET NIGHTLY 90 Tablet 0    enalapril (VASOTEC) 10 mg tablet Take 1 tablet by mouth once daily 90 Tablet 1    triamcinolone acetonide (KENALOG) 0.1 % topical cream Apply  to affected area two (2) times a day. use thin layer 85.2 g 4    lancets (TRUEPLUS LANCETS) 33 gauge misc Use as directed to check blood sugar once daily. Dx:E11.9 Please dispense appropriate product 100 Lancet 4    glucose blood VI test strips (TRUETEST TEST STRIPS) strip Use as directed to check blood sugar once daily.  Dx:E11.9 50 Strip 5    Blood-Glucose Meter (TRUE METRIX GLUCOSE METER) misc Use as directed to check blood sugar once daily. Dx:E11.9 Please dispense appropriate product 1 Each 0    glucose blood VI test strips (TRUE METRIX GLUCOSE TEST STRIP) strip Use as directed to check blood sugar once daily. Dx:E11.9 Please dispense appropriate product 50 Strip 5    acetaminophen (TYLENOL) 650 mg CR tablet Take 650 mg by mouth nightly.  tamsulosin (FLOMAX) 0.4 mg capsule Take 0.4 mg by mouth daily (after dinner).  finasteride (PROSCAR) 5 mg tablet Take 5 mg by mouth daily (after dinner).  cholecalciferol (VITAMIN D-3) 400 unit Tab tablet Take 400 Units by mouth daily.  aspirin 81 mg Tab Take 81 mg by mouth daily.  cyanocobalamin, vitamin B-12, 5,000 mcg subl by SubLINGual route. (Patient not taking: Reported on 3/29/2022)      varicella-zoster gE vac,2 of 2 50 mcg susr Shingrix, one injection now and repeat in 4-6 months. To be administered at Pharmacy.  Fax confirmation to me at [phone]. 2 Each 0     No Known Allergies    Family History   Problem Relation Age of Onset    Cancer Mother     Cancer Brother         lung    Heart Disease Brother      Social History     Tobacco Use    Smoking status: Former Smoker     Packs/day: 1.00     Years: 15.00     Pack years: 15.00    Smokeless tobacco: Never Used   Substance Use Topics    Alcohol use: No         Grace Ramsey MD

## 2022-03-29 NOTE — PROGRESS NOTES
[facility]    History of Present Illness:   Yohan Choudhury is a 80 y.o. male with history of DM, HTN, Prostae Cancer, HLD  CC: Follow up Chronic conditions  History provided by patient and Records    HPI:  Grieving: Wife recently . Overall doing well. Denies SI. Denies severe depressed mood. Hypertension Follow up:  Currently Taking:   Key CAD CHF Meds             hydroCHLOROthiazide (HYDRODIURIL) 25 mg tablet (Taking) Take 1 tablet by mouth once daily    simvastatin (ZOCOR) 20 mg tablet (Taking) TAKE 1 TABLET NIGHTLY    enalapril (VASOTEC) 10 mg tablet (Taking) Take 1 tablet by mouth once daily    aspirin 81 mg Tab (Taking) Take 81 mg by mouth daily. The patient reports:  taking medications as instructed, no medication side effects noted, no TIA's, no chest pain on exertion, no dyspnea on exertion, no swelling of ankles, no orthostatic dizziness or lightheadedness, no orthopnea or paroxysmal nocturnal dyspnea. BP Readings from Last 3 Encounters:   22 124/64   10/07/21 (!) 119/59   03/15/21 126/68      Diabetes Follow up: Overall the patient feels well with good energy level. Current Medications:   Key Antihyperglycemic Medications     Patient is on no antihyperglycemic meds. .   Insulin dependence: NO   Frequency of home glucose testing: Daily   Blood Sugar range at home: <150's    Last eye exam: In past 12 months. Last foot exam: This year.    Polyuria, polyphagia or polydipsia: NO   Retinopathy: NO   Neuropathy SX: Yes   Low blood sugar symptoms: NO   Dietary compliance: compliant most of the time   Medication compliance: compliant most of the time   On ASA: YES   Tobacco Use: NO   Depression: NO     Wt Readings from Last 3 Encounters:   22 186 lb 3.2 oz (84.5 kg)   10/07/21 162 lb (73.5 kg)   03/15/21 170 lb (77.1 kg)        Lab Results   Component Value Date/Time    Hemoglobin A1c 5.7 (H) 10/07/2021 02:00 PM    Hemoglobin A1c (POC) 6.1 08/05/2015 08:45 AM        Lab Results   Component Value Date/Time    Microalbumin/Creat ratio (mg/g creat) 15 03/15/2021 03:35 PM    Microalbumin,urine random 1.83 03/15/2021 03:35 PM         Lab Results   Component Value Date/Time    Creatinine 0.97 10/07/2021 02:00 PM      Hypertriglyceridemia Follow up:   Cardiovascular risks for him are: diabetic  hypertension  hyperlipidemia. Current Medications:  Key Antihyperlipidemia Meds             simvastatin (ZOCOR) 20 mg tablet (Taking) TAKE 1 TABLET NIGHTLY          Compliance: YES   Myalgias: NO   Fatigue: NO   Other side effects: NO     Wt Readings from Last 3 Encounters:   03/29/22 186 lb 3.2 oz (84.5 kg)   10/07/21 162 lb (73.5 kg)   03/15/21 170 lb (77.1 kg)       Lab Results   Component Value Date/Time    Cholesterol, total 149 10/07/2021 02:00 PM    HDL Cholesterol 46 10/07/2021 02:00 PM    LDL, calculated 76.4 10/07/2021 02:00 PM    VLDL, calculated 26.6 10/07/2021 02:00 PM    Triglyceride 133 10/07/2021 02:00 PM    CHOL/HDL Ratio 3.2 10/07/2021 02:00 PM      Lab Results   Component Value Date/Time    ALT (SGPT) 16 10/07/2021 02:00 PM    AST (SGOT) 22 10/07/2021 02:00 PM    Alk. phosphatase 78 10/07/2021 02:00 PM    Bilirubin, total 0.8 10/07/2021 02:00 PM      Prostate Cancer: Patient seen by urology, evaluated, and no significant concerns noted.     Health Maintenance  Health Maintenance Due   Topic Date Due    COVID-19 Vaccine (1) Never done    Shingrix Vaccine Age 50> (1 of 2) Never done    Eye Exam Retinal or Dilated  12/04/2020    Medicare Yearly Exam  02/19/2021    MICROALBUMIN Q1  03/15/2022       Past Medical, Family, and Social History:     Current Outpatient Medications on File Prior to Visit   Medication Sig Dispense Refill    hydroCHLOROthiazide (HYDRODIURIL) 25 mg tablet Take 1 tablet by mouth once daily 90 Tablet 0    simvastatin (ZOCOR) 20 mg tablet TAKE 1 TABLET NIGHTLY 90 Tablet 0    enalapril (VASOTEC) 10 mg tablet Take 1 tablet by mouth once daily 90 Tablet 1    triamcinolone acetonide (KENALOG) 0.1 % topical cream Apply  to affected area two (2) times a day. use thin layer 85.2 g 4    lancets (TRUEPLUS LANCETS) 33 gauge misc Use as directed to check blood sugar once daily. Dx:E11.9 Please dispense appropriate product 100 Lancet 4    glucose blood VI test strips (TRUETEST TEST STRIPS) strip Use as directed to check blood sugar once daily. Dx:E11.9 50 Strip 5    Blood-Glucose Meter (TRUE METRIX GLUCOSE METER) misc Use as directed to check blood sugar once daily. Dx:E11.9 Please dispense appropriate product 1 Each 0    glucose blood VI test strips (TRUE METRIX GLUCOSE TEST STRIP) strip Use as directed to check blood sugar once daily. Dx:E11.9 Please dispense appropriate product 50 Strip 5    acetaminophen (TYLENOL) 650 mg CR tablet Take 650 mg by mouth nightly.  tamsulosin (FLOMAX) 0.4 mg capsule Take 0.4 mg by mouth daily (after dinner).  finasteride (PROSCAR) 5 mg tablet Take 5 mg by mouth daily (after dinner).  cholecalciferol (VITAMIN D-3) 400 unit Tab tablet Take 400 Units by mouth daily.  aspirin 81 mg Tab Take 81 mg by mouth daily.  cyanocobalamin, vitamin B-12, 5,000 mcg subl by SubLINGual route. (Patient not taking: Reported on 3/29/2022)      varicella-zoster gE vac,2 of 2 50 mcg susr Shingrix, one injection now and repeat in 4-6 months. To be administered at Pharmacy. Fax confirmation to me at [phone]. 2 Each 0     No current facility-administered medications on file prior to visit.        Patient Active Problem List   Diagnosis Code    Hypertension I10    Erythrocytosis D75.1    Erectile dysfunction N52.9    Colon polyp K63.5    Hyperkeratosis L85.9    Hyperlipidemia E78.5    Prostate cancer ([facility] [address] C61    Type 2 diabetes mellitus with diabetic neuropathy ([facility] [address] [250.60, 357.2] E11.40       Social History     Socioeconomic History    Marital status:    Tobacco Use    Smoking status: Former Smoker     Packs/day: 1.00     Years: 15.00     Pack years: 15.00    Smokeless tobacco: Never Used   Substance and Sexual Activity    Alcohol use: No    Drug use: No        Review of Systems   Review of Systems   Constitutional: Negative for chills and fever. Cardiovascular: Negative for chest pain and palpitations. Gastrointestinal: Negative for nausea and vomiting. Neurological: Negative for dizziness, tingling and headaches. Objective:     Visit Vitals  /64 (BP 1 Location: Left arm, BP Patient Position: Sitting)   Pulse 70   Temp 96.8 °F (36 °C) (Oral)   Resp 18   Ht 5' 9\" (1.753 m)   Wt 186 lb 3.2 oz (84.5 kg)   SpO2 99%   BMI 27.50 kg/m²        Physical Exam  Vitals and nursing note reviewed. Constitutional:       Appearance: Normal appearance. HENT:      Head: Normocephalic and atraumatic. Cardiovascular:      Rate and Rhythm: Normal rate and regular rhythm. Pulses: Normal pulses. Heart sounds: Normal heart sounds. Pulmonary:      Effort: Pulmonary effort is normal.      Breath sounds: Normal breath sounds. Abdominal:      General: Abdomen is flat. Bowel sounds are normal.      Palpations: Abdomen is soft. Musculoskeletal:      Cervical back: Normal range of motion and neck supple. Skin:     General: Skin is warm and dry. Neurological:      Mental Status: He is alert. Diabetic foot exam:     Left Foot:   Visual Exam: callous - Great Toe and heel   Pulse DP: 1+ (weak)   Filament test: normal sensation    Vibratory sensation: normal      Right Foot:   Visual Exam: callous - Great toe and heel   Pulse DP: 1+ (weak)   Filament test: normal sensation    Vibratory sensation: normal         Pertinent Labs/Studies:      Assessment and orders:       ICD-10-CM ICD-9-CM    1. Primary hypertension  I10 401.9 CBC W/O DIFF      METABOLIC PANEL, COMPREHENSIVE   2.  Type 2 diabetes mellitus with diabetic neuropathy, without long-term current use of insulin ([address] E11.40 250.60 HEMOGLOBIN A1C WITH EAG     357.2 MICROALBUMIN, UR, RAND W/ MICROALB/CREAT RATIO      HM DIABETES FOOT EXAM   3. Prostate cancer (HCC)  C61 185 PSA W/ REFLX FREE PSA   4. Pure hypercholesterolemia  E78.00 272.0 LIPID PANEL     Diagnoses and all orders for this visit:    1. Primary hypertension: Our goal is to normalize the blood pressure to decrease the risks of strokes and heart attacks. The patient is in agreement with the plan. -     CBC W/O DIFF; Future  -     METABOLIC PANEL, COMPREHENSIVE; Future    2. Type 2 diabetes mellitus with diabetic neuropathy, without long-term current use of insulin [facility]): Discussed with patient today that the goal for their diabetes is to have a HgA1C<7 and ideally as close to 6.5 as possible. We discussed diet and medications. The goal for the cholesterol LDL is less than 70 and HDL>40. Patient is aware of the need for yearly eye exams and to take care of their feet daily. Discussed with patient that blood pressure should be less than 130/80 and watching salt intake is very important.    -     HEMOGLOBIN A1C WITH EAG; Future  -     MICROALBUMIN, UR, RAND W/ MICROALB/CREAT RATIO; Future  -      DIABETES FOOT EXAM    3. Prostate cancer [facility]): Repeat labs  -     PSA W/ REFLX FREE PSA; Future    4. Pure hypercholesterolemia: The patient is aware of our goal to reduce or eliminate the long term problems (such as strokes and heart attacks) related to poorly controlled Triglycerides, LDL, Cholesterol.   -     LIPID PANEL; Future      Follow-up and Dispositions    · Return in about 6 months (around 9/29/2022) for Follow up. I have discussed the diagnosis with the patient and the intended plan as seen in the above orders. Social history, medical history, and labs were reviewed. The patient has received an after-visit summary and questions were answered concerning future plans.   I have discussed medication side effects and warnings with the patient as well.    MD MATTHEW Elias & PINA SATUDILLO [facility]  03/29/22

## 2022-03-29 NOTE — PATIENT INSTRUCTIONS
Your doctor has recommended that you have an eye exam done. You can contact one of the below offices to schedule your own appointment. Once you have the visit, please ask their office to send us a copy for your record here. [facility]                     [phone]  Dr. Pamela Greco                          [phone]  Dr. Thania Brown                           [phone]  [place]  [address] [place] [address] Physicians             [phone]

## 2022-03-30 LAB
ALBUMIN SERPL-MCNC: 4.2 G/DL (ref 3.5–5)
ALBUMIN/GLOB SERPL: 1.1 {RATIO} (ref 1.1–2.2)
ALP SERPL-CCNC: 68 U/L (ref 45–117)
ALT SERPL-CCNC: 20 U/L (ref 12–78)
ANION GAP SERPL CALC-SCNC: 6 MMOL/L (ref 5–15)
AST SERPL-CCNC: 21 U/L (ref 15–37)
BILIRUB SERPL-MCNC: 0.7 MG/DL (ref 0.2–1)
BUN SERPL-MCNC: 17 MG/DL (ref 6–20)
BUN/CREAT SERPL: 17 (ref 12–20)
CALCIUM SERPL-MCNC: 9.6 MG/DL (ref 8.5–10.1)
CHLORIDE SERPL-SCNC: 100 MMOL/L (ref 97–108)
CHOLEST SERPL-MCNC: 167 MG/DL
CO2 SERPL-SCNC: 28 MMOL/L (ref 21–32)
CREAT SERPL-MCNC: 1.03 MG/DL (ref 0.7–1.3)
CREAT UR-MCNC: 98.4 MG/DL
ERYTHROCYTE [DISTWIDTH] IN BLOOD BY AUTOMATED COUNT: 13.5 % (ref 11.5–14.5)
EST. AVERAGE GLUCOSE BLD GHB EST-MCNC: 131 MG/DL
GLOBULIN SER CALC-MCNC: 3.9 G/DL (ref 2–4)
GLUCOSE SERPL-MCNC: 107 MG/DL (ref 65–100)
HBA1C MFR BLD: 6.2 % (ref 4–5.6)
HCT VFR BLD AUTO: 41.7 % (ref 36.6–50.3)
HDLC SERPL-MCNC: 48 MG/DL
HDLC SERPL: 3.5 {RATIO} (ref 0–5)
HGB BLD-MCNC: 13.8 G/DL (ref 12.1–17)
LDLC SERPL CALC-MCNC: 101.2 MG/DL (ref 0–100)
MCH RBC QN AUTO: 32.6 PG (ref 26–34)
MCHC RBC AUTO-ENTMCNC: 33.1 G/DL (ref 30–36.5)
MCV RBC AUTO: 98.6 FL (ref 80–99)
MICROALBUMIN UR-MCNC: 0.55 MG/DL
MICROALBUMIN/CREAT UR-RTO: 6 MG/G (ref 0–30)
NRBC # BLD: 0 K/UL (ref 0–0.01)
NRBC BLD-RTO: 0 PER 100 WBC
PLATELET # BLD AUTO: 158 K/UL (ref 150–400)
PMV BLD AUTO: 11.4 FL (ref 8.9–12.9)
POTASSIUM SERPL-SCNC: 4.2 MMOL/L (ref 3.5–5.1)
PROT SERPL-MCNC: 8.1 G/DL (ref 6.4–8.2)
RBC # BLD AUTO: 4.23 M/UL (ref 4.1–5.7)
SODIUM SERPL-SCNC: 134 MMOL/L (ref 136–145)
TRIGL SERPL-MCNC: 89 MG/DL (ref ?–150)
VLDLC SERPL CALC-MCNC: 17.8 MG/DL
WBC # BLD AUTO: 7.2 K/UL (ref 4.1–11.1)

## 2022-03-31 ENCOUNTER — TELEPHONE (OUTPATIENT)
Dept: FAMILY MEDICINE CLINIC | Age: 87
End: 2022-03-31

## 2022-03-31 LAB
PSA SERPL-MCNC: 141 NG/ML (ref 0–4)
REFLEX CRITERIA: ABNORMAL

## 2022-03-31 NOTE — TELEPHONE ENCOUNTER
Pt wants to know is it ok for him to take another booster shot at [facility]?

## 2022-03-31 NOTE — TELEPHONE ENCOUNTER
Call placed to pt. He stated that its been over three months since his last shot. Demond Voss wants him to talk to pharmacist

## 2022-04-25 ENCOUNTER — TRANSCRIBE ORDER (OUTPATIENT)
Dept: SCHEDULING | Age: 87
End: 2022-04-25

## 2022-04-25 DIAGNOSIS — C61 PROSTATE CANCER (HCC): Primary | ICD-10-CM

## 2022-04-25 DIAGNOSIS — C79.52 SECONDARY MALIGNANT NEOPLASM OF BONE AND BONE MARROW (HCC): ICD-10-CM

## 2022-04-25 DIAGNOSIS — C79.51 SECONDARY MALIGNANT NEOPLASM OF BONE AND BONE MARROW (HCC): ICD-10-CM

## 2022-05-06 DIAGNOSIS — E78.00 PURE HYPERCHOLESTEROLEMIA: Chronic | ICD-10-CM

## 2022-05-06 RX ORDER — SIMVASTATIN 20 MG/1
TABLET, FILM COATED ORAL
Qty: 90 TABLET | Refills: 3 | Status: SHIPPED | OUTPATIENT
Start: 2022-05-06

## 2022-07-20 RX ORDER — HYDROCHLOROTHIAZIDE 25 MG/1
TABLET ORAL
Qty: 90 TABLET | Refills: 0 | Status: SHIPPED | OUTPATIENT
Start: 2022-07-20

## 2022-08-22 ENCOUNTER — TELEPHONE (OUTPATIENT)
Dept: FAMILY MEDICINE CLINIC | Age: 87
End: 2022-08-22

## 2022-08-22 RX ORDER — ONDANSETRON 4 MG/1
4 TABLET, ORALLY DISINTEGRATING ORAL
Qty: 30 TABLET | Refills: 1 | Status: SHIPPED | OUTPATIENT
Start: 2022-08-22

## 2022-08-22 NOTE — TELEPHONE ENCOUNTER
Pt was prescribed Ondansetron 4mg when he went to [place] [address]. Pt is out of medication and would like to know if PCP will send a Rx to [facility]. Pt lesley Schafer called to [facility] last week requesting pharmacy to send request to Dr. Jett Joseph but request never received.

## 2022-08-23 NOTE — TELEPHONE ENCOUNTER
Refill ordered to [facility].     MD MATTHEW Tate & PINA ASTUDILLO [facility]  08/22/22

## 2022-09-06 ENCOUNTER — TELEPHONE (OUTPATIENT)
Dept: FAMILY MEDICINE CLINIC | Age: 87
End: 2022-09-06

## 2022-09-06 NOTE — TELEPHONE ENCOUNTER
PT is only getting up no to use the restroom. Pt is struggling because of his right hip and leg gives out on him. Pt could use a home health nurse. Pt would like Robert Rice with [facility] to help him. Please advise.

## 2022-09-09 ENCOUNTER — VIRTUAL VISIT (OUTPATIENT)
Dept: FAMILY MEDICINE CLINIC | Age: 87
End: 2022-09-09
Payer: MEDICARE

## 2022-09-09 DIAGNOSIS — R53.81 DEBILITY: ICD-10-CM

## 2022-09-09 DIAGNOSIS — C61 PROSTATE CANCER (HCC): Primary | ICD-10-CM

## 2022-09-09 PROCEDURE — 99443 PR PHYS/QHP TELEPHONE EVALUATION 21-30 MIN: CPT | Performed by: FAMILY MEDICINE

## 2022-09-09 NOTE — PROGRESS NOTES
Jomar Faustin is a 80 y.o. male evaluated via Telephone on 22. Patient Identity confirmed by . Consent:  He and/or health care decision maker is aware that that he may receive a bill for this telephone service, depending on his insurance coverage, and has provided verbal consent to proceed: Yes    Physician Location: Office  Patient Location: Home  Nurse Assisting with Encounter: Diann Mcghee LPN    Chief Complaint   Patient presents with    Prostate Cancer        Information gathered from patient and/or health care decision maker. HPI:   Discussed with Patient and a Care Giver. Patient states that overall he is feeling fine. When asked about falls he stated he simply felt tired and stumbled. Per care giver, patient has had three falls where his legs seemed to give out, but no reported pain by either. Per caregiver he has significant difficulty getting out of bed due to leg debility and is becoming more deconditioned. Using bedside urinals now so he does not have to stand. Patient noted to have a area of swelling/ mass on the right flank towards the anterior area. Patient with known Metastatic Prostate cane with bone lesions/and mass. Patient denies any pain at this time, but does endorse feeling tired and having less \"wind\" than previously, and has been using Lidocaine patches on the back as well. Noting persistent nausea as well, using Zofran. Discussed diagnosis with patient and likely life expectancy and he understands this is terminal and stated \"The good lord can take whenever he wants. \"  Discussed Hospice evaluation with patient, who stated he felt like he did not need much help, but would be willing to be evaluated if Family thought Appropriate. Care Giver believes would be Appropriate. Patient appears to have worsening debility at this time due to Prostate Cancer, and would benefit from home care aids. Given Cancer poor candidate for normal PT/OT.     Review of Systems   Constitutional:  Positive for malaise/fatigue. Negative for chills and fever. Respiratory:  Negative for cough and sputum production. Cardiovascular:  Negative for chest pain and palpitations. Gastrointestinal:  Negative for abdominal pain, nausea and vomiting. Neurological:  Negative for dizziness and headaches. Limited Exam:  Due to this being a TeleHealth evaluation, many elements of the physical examination are unable to be assessed. Constitutional: Appears well-developed and well-nourished in no apparent distress   Mental status: Alert and awake, Oriented to person/place/time, Able to follow commands  Psychiatric: Normal affect, normal judgment/insight. No hallucinations     Current Outpatient Medications on File Prior to Visit   Medication Sig Dispense Refill    ondansetron (ZOFRAN ODT) 4 mg disintegrating tablet Take 1 Tablet by mouth every eight (8) hours as needed for Nausea or Vomiting. 30 Tablet 1    hydroCHLOROthiazide (HYDRODIURIL) 25 mg tablet Take 1 tablet by mouth once daily 90 Tablet 0    simvastatin (ZOCOR) 20 mg tablet TAKE 1 TABLET BY MOUTH AT  NIGHT 90 Tablet 3    enalapril (VASOTEC) 10 mg tablet Take 1 tablet by mouth once daily 90 Tablet 1    cyanocobalamin, vitamin B-12, 5,000 mcg subl by SubLINGual route. (Patient not taking: Reported on 3/29/2022)      varicella-zoster gE vac,2 of 2 50 mcg susr Shingrix, one injection now and repeat in 4-6 months. To be administered at Pharmacy. Fax confirmation to me at [phone]. 2 Each 0    triamcinolone acetonide (KENALOG) 0.1 % topical cream Apply  to affected area two (2) times a day. use thin layer 85.2 g 4    lancets (TRUEPLUS LANCETS) 33 gauge misc Use as directed to check blood sugar once daily. Dx:E11.9 Please dispense appropriate product 100 Lancet 4    glucose blood VI test strips (TRUETEST TEST STRIPS) strip Use as directed to check blood sugar once daily.  Dx:E11.9 50 Strip 5    Blood-Glucose Meter (TRUE METRIX GLUCOSE METER) misc Use as directed to check blood sugar once daily. Dx:E11.9 Please dispense appropriate product 1 Each 0    glucose blood VI test strips (TRUE METRIX GLUCOSE TEST STRIP) strip Use as directed to check blood sugar once daily. Dx:E11.9 Please dispense appropriate product 50 Strip 5    acetaminophen (TYLENOL) 650 mg CR tablet Take 650 mg by mouth nightly. tamsulosin (FLOMAX) 0.4 mg capsule Take 0.4 mg by mouth daily (after dinner). finasteride (PROSCAR) 5 mg tablet Take 5 mg by mouth daily (after dinner). cholecalciferol (VITAMIN D-3) 400 unit Tab tablet Take 400 Units by mouth daily. aspirin 81 mg Tab Take 81 mg by mouth daily. No current facility-administered medications on file prior to visit. No Known Allergies     Patient Active Problem List    Diagnosis Date Noted    Type 2 diabetes mellitus with diabetic neuropathy ([facility] [250.60, 357.2] 08/05/2015    Prostate cancer ([facility] 01/27/2011    Colon polyp 11/16/2010    Hyperkeratosis 11/16/2010    Hyperlipidemia 11/16/2010    Hypertension     Erythrocytosis     Erectile dysfunction         Health Maintenance Due   Topic Date Due    COVID-19 Vaccine (1) Never done    Shingrix Vaccine Age 50> (1 of 2) Never done    Eye Exam Retinal or Dilated  12/04/2020    Flu Vaccine (1) 09/01/2022        Assessment/Plan:  Details of this discussion including any medical advice provided: Discussed with patient and care giver. Will get evaluation by Hospice at this time. Patient would benefit from home assistance, and likely would benefit from Hospital bed. ICD-10-CM ICD-9-CM    1. Prostate cancer ([facility]  C61 185 REFERRAL TO HOSPICE      2. Debility  R53.81 799.3 REFERRAL TO HOSPICE        Follow-up and Dispositions    Return in about 4 weeks (around 10/7/2022). Total Time: minutes: 21-30 minutes was spent on telemedicine encounter discussing above problems and plans.   Patient Problem list, medications, and Allergies were reviewed during this encounter. Pursuant to the emergency declaration under the [facility] waiver authority and the [place] and [employer] Act, this Telephone Visit was conducted, with patient's consent, to reduce the patient's risk of exposure to COVID-19 and provide continuity of care for an established patient. I affirm this is a Patient Initiated Episode with an Established Patient who has not had a related appointment within my department in the past 7 days or scheduled within the next 24 hours. Discussed diagnoses in detail with patient. Medication risks/benefits/side effects discussed with patient. All of the patient's questions were addressed. The patient understands and agrees with our plan of care. The patient knows to call back if they are unsure of or forget any changes we discussed today or if the symptoms change.     Note: not billable if this call serves to triage the patient into an appointment for the relevant concern    MD MATTHEW Grier & PINA ASTUDILLO [facility]  09/11/22

## 2022-09-12 ENCOUNTER — TELEPHONE (OUTPATIENT)
Dept: FAMILY MEDICINE CLINIC | Age: 87
End: 2022-09-12

## 2022-09-12 DIAGNOSIS — N30.00 ACUTE CYSTITIS WITHOUT HEMATURIA: Primary | ICD-10-CM

## 2022-09-12 RX ORDER — LIDOCAINE 50 MG/G
PATCH TOPICAL
Qty: 90 EACH | Refills: 1 | Status: SHIPPED | OUTPATIENT
Start: 2022-09-12

## 2022-09-12 RX ORDER — LIDOCAINE 50 MG/G
PATCH TOPICAL
COMMUNITY
Start: 2022-08-07 | End: 2022-09-12 | Stop reason: SDUPTHER

## 2022-09-12 RX ORDER — NITROFURANTOIN 25; 75 MG/1; MG/1
100 CAPSULE ORAL 2 TIMES DAILY
Qty: 14 CAPSULE | Refills: 0 | Status: SHIPPED | OUTPATIENT
Start: 2022-09-12 | End: 2022-09-19

## 2022-09-12 NOTE — TELEPHONE ENCOUNTER
Trial Macrobid, if not effective will need culture.     MD MATTHEW Hicks & PINA ASTUDILLO [facility]  09/12/22

## 2022-09-12 NOTE — TELEPHONE ENCOUNTER
Pt Niece states Mr Gilbert Suarez says every time he goes to bathroom it burns. They would like something called into [facility] [place]. .    Thanks

## 2022-09-15 ENCOUNTER — TELEPHONE (OUTPATIENT)
Dept: FAMILY MEDICINE CLINIC | Age: 87
End: 2022-09-15

## 2022-09-15 NOTE — TELEPHONE ENCOUNTER
[phone] Hospice  [facility] is requesting Dr Ross Srinivasan follow pt in hospice  Please call [facility] if you have any questions or concerns

## 2022-09-19 NOTE — TELEPHONE ENCOUNTER
Call returned to hospice and informed per  I will not be following Mr. Canseco Lily as the Hospice provider. I know Eyal Cortez was requesting, but I discussed it with her. I cannot prescribe certain meds that he may require in the future.

## 2022-09-19 NOTE — TELEPHONE ENCOUNTER
Spoke to Frye Beti de [place], advised Hospice program will follow patient so they can order the required medications for Pain management.     MD MATTHEW Cherry & PINA ASTUDILLO [facility]  09/19/22

## (undated) DEVICE — SYR 3ML LL TIP 1/10ML GRAD --

## (undated) DEVICE — CONTAINER SPEC 20 ML LID NEUT BUFF FORMALIN 10 % POLYPR STS

## (undated) DEVICE — 1200 GUARD II KIT W/5MM TUBE W/O VAC TUBE: Brand: GUARDIAN

## (undated) DEVICE — KIT COLON W/ 1.1OZ LUB AND 2 END

## (undated) DEVICE — SYR 5ML 1/5 GRAD LL NSAF LF --

## (undated) DEVICE — NDL FLTR TIP 5 MIC 18GX1.5IN --

## (undated) DEVICE — FCPS BX HOT LG 2.2MMX240CM

## (undated) DEVICE — NDL PRT INJ NSAF BLNT 18GX1.5 --

## (undated) DEVICE — TRAP SUC MUCOUS 70ML -- MEDICHOICE MEDLINE

## (undated) DEVICE — SOLIDIFIER MEDC 1200ML -- CONVERT TO 356117

## (undated) DEVICE — BAG BELONG PT PERS CLEAR HANDL

## (undated) DEVICE — REM POLYHESIVE ADULT PATIENT RETURN ELECTRODE: Brand: VALLEYLAB

## (undated) DEVICE — CATH IV AUTOGRD BC BLU 22GA 25 -- INSYTE

## (undated) DEVICE — FORCEPS BX L240CM JAW DIA2.8MM L CAP W/ NDL MIC MESH TOOTH

## (undated) DEVICE — BASIN EMESIS 500CC ROSE 250/CS 60/PLT: Brand: MEDEGEN MEDICAL PRODUCTS, LLC

## (undated) DEVICE — CLIP INT L235CM WRK CHAN DIA2.8MM OPN 11MM LCK MECHANISM MR

## (undated) DEVICE — SNARE ENDOSCP M L240CM W27MM SHTH DIA2.4MM CHN 2.8MM OVL

## (undated) DEVICE — Device

## (undated) DEVICE — KENDALL RADIOLUCENT FOAM MONITORING ELECTRODE -RECTANGULAR SHAPE: Brand: KENDALL

## (undated) DEVICE — SET ADMIN 16ML TBNG L100IN 2 Y INJ SITE IV PIGGY BK DISP

## (undated) DEVICE — BAG SPEC BIOHZRD 10 X 10 IN --